# Patient Record
Sex: MALE | Race: WHITE | NOT HISPANIC OR LATINO | Employment: OTHER | RURAL
[De-identification: names, ages, dates, MRNs, and addresses within clinical notes are randomized per-mention and may not be internally consistent; named-entity substitution may affect disease eponyms.]

---

## 2021-03-18 RX ORDER — AMIODARONE HYDROCHLORIDE 200 MG/1
200 TABLET ORAL DAILY
COMMUNITY
End: 2021-11-01

## 2021-03-18 RX ORDER — CARVEDILOL 12.5 MG/1
12.5 TABLET ORAL 2 TIMES DAILY
COMMUNITY
End: 2021-11-23 | Stop reason: SDUPTHER

## 2021-03-18 RX ORDER — ASPIRIN 81 MG/1
81 TABLET ORAL 2 TIMES DAILY
COMMUNITY
End: 2021-03-23 | Stop reason: ALTCHOICE

## 2021-03-23 ENCOUNTER — OFFICE VISIT (OUTPATIENT)
Dept: CARDIOLOGY | Facility: CLINIC | Age: 76
End: 2021-03-23
Payer: MEDICARE

## 2021-03-23 VITALS
WEIGHT: 153 LBS | OXYGEN SATURATION: 98 % | SYSTOLIC BLOOD PRESSURE: 138 MMHG | DIASTOLIC BLOOD PRESSURE: 84 MMHG | HEART RATE: 48 BPM | HEIGHT: 66 IN | BODY MASS INDEX: 24.59 KG/M2

## 2021-03-23 DIAGNOSIS — I38 VALVULAR HEART DISEASE: ICD-10-CM

## 2021-03-23 DIAGNOSIS — Z79.899 HIGH RISK MEDICATION USE: Primary | ICD-10-CM

## 2021-03-23 DIAGNOSIS — I10 ESSENTIAL HYPERTENSION: ICD-10-CM

## 2021-03-23 DIAGNOSIS — I48.0 PAROXYSMAL ATRIAL FIBRILLATION: ICD-10-CM

## 2021-03-23 DIAGNOSIS — E78.00 PURE HYPERCHOLESTEROLEMIA: ICD-10-CM

## 2021-03-23 DIAGNOSIS — R00.1 BRADYCARDIA: ICD-10-CM

## 2021-03-23 PROCEDURE — 93010 ELECTROCARDIOGRAM REPORT: CPT | Mod: S$PBB,,, | Performed by: INTERNAL MEDICINE

## 2021-03-23 PROCEDURE — 99999 PR PBB SHADOW E&M-EST. PATIENT-LVL III: CPT | Mod: PBBFAC,,, | Performed by: NURSE PRACTITIONER

## 2021-03-23 PROCEDURE — 99213 OFFICE O/P EST LOW 20 MIN: CPT | Mod: PBBFAC,25 | Performed by: NURSE PRACTITIONER

## 2021-03-23 PROCEDURE — 99213 OFFICE O/P EST LOW 20 MIN: CPT | Mod: S$PBB,,, | Performed by: NURSE PRACTITIONER

## 2021-03-23 PROCEDURE — 93010 EKG 12-LEAD: ICD-10-PCS | Mod: S$PBB,,, | Performed by: INTERNAL MEDICINE

## 2021-03-23 PROCEDURE — 99213 PR OFFICE/OUTPT VISIT, EST, LEVL III, 20-29 MIN: ICD-10-PCS | Mod: S$PBB,,, | Performed by: NURSE PRACTITIONER

## 2021-03-23 PROCEDURE — 99999 PR PBB SHADOW E&M-EST. PATIENT-LVL III: ICD-10-PCS | Mod: PBBFAC,,, | Performed by: NURSE PRACTITIONER

## 2021-03-23 PROCEDURE — 93005 ELECTROCARDIOGRAM TRACING: CPT | Mod: PBBFAC | Performed by: INTERNAL MEDICINE

## 2021-03-23 RX ORDER — AMLODIPINE BESYLATE 5 MG/1
5 TABLET ORAL DAILY
COMMUNITY
Start: 2021-02-24 | End: 2022-03-29

## 2021-03-23 RX ORDER — AMOXICILLIN 500 MG
1 CAPSULE ORAL DAILY
COMMUNITY

## 2021-11-01 ENCOUNTER — INFUSION (OUTPATIENT)
Dept: INFECTIOUS DISEASES | Facility: HOSPITAL | Age: 76
End: 2021-11-01
Attending: NURSE PRACTITIONER
Payer: MEDICARE

## 2021-11-01 ENCOUNTER — OFFICE VISIT (OUTPATIENT)
Dept: FAMILY MEDICINE | Facility: CLINIC | Age: 76
End: 2021-11-01
Payer: MEDICARE

## 2021-11-01 VITALS
OXYGEN SATURATION: 97 % | DIASTOLIC BLOOD PRESSURE: 88 MMHG | SYSTOLIC BLOOD PRESSURE: 144 MMHG | TEMPERATURE: 100 F | HEART RATE: 72 BPM

## 2021-11-01 VITALS — OXYGEN SATURATION: 95 % | HEART RATE: 68 BPM

## 2021-11-01 DIAGNOSIS — R05.9 COUGH: ICD-10-CM

## 2021-11-01 DIAGNOSIS — U07.1 COVID-19: ICD-10-CM

## 2021-11-01 DIAGNOSIS — R50.9 FEVER, UNSPECIFIED FEVER CAUSE: ICD-10-CM

## 2021-11-01 DIAGNOSIS — R52 BODY ACHES: ICD-10-CM

## 2021-11-01 DIAGNOSIS — U07.1 COVID-19: Primary | ICD-10-CM

## 2021-11-01 LAB
CTP QC/QA: YES
FLUAV AG NPH QL: NEGATIVE
FLUBV AG NPH QL: NEGATIVE
SARS-COV-2 AG RESP QL IA.RAPID: POSITIVE

## 2021-11-01 PROCEDURE — M0243 CASIRIVI AND IMDEVI INFUSION: HCPCS | Performed by: NURSE PRACTITIONER

## 2021-11-01 PROCEDURE — 87428 SARSCOV & INF VIR A&B AG IA: CPT | Mod: RHCUB | Performed by: NURSE PRACTITIONER

## 2021-11-01 PROCEDURE — 99212 OFFICE O/P EST SF 10 MIN: CPT | Mod: CR,,, | Performed by: NURSE PRACTITIONER

## 2021-11-01 PROCEDURE — 99212 PR OFFICE/OUTPT VISIT, EST, LEVL II, 10-19 MIN: ICD-10-PCS | Mod: CR,,, | Performed by: NURSE PRACTITIONER

## 2021-11-01 PROCEDURE — 63600175 PHARM REV CODE 636 W HCPCS: Performed by: NURSE PRACTITIONER

## 2021-11-01 RX ORDER — DIPHENHYDRAMINE HYDROCHLORIDE 50 MG/ML
25 INJECTION INTRAMUSCULAR; INTRAVENOUS ONCE AS NEEDED
Status: DISCONTINUED | OUTPATIENT
Start: 2021-11-01 | End: 2022-03-29

## 2021-11-01 RX ORDER — ALBUTEROL SULFATE 90 UG/1
2 AEROSOL, METERED RESPIRATORY (INHALATION)
Status: DISCONTINUED | OUTPATIENT
Start: 2021-11-01 | End: 2022-03-29

## 2021-11-01 RX ORDER — SODIUM CHLORIDE 0.9 % (FLUSH) 0.9 %
10 SYRINGE (ML) INJECTION
Status: DISCONTINUED | OUTPATIENT
Start: 2021-11-01 | End: 2022-03-29

## 2021-11-01 RX ORDER — ONDANSETRON 4 MG/1
4 TABLET, ORALLY DISINTEGRATING ORAL ONCE AS NEEDED
Status: DISCONTINUED | OUTPATIENT
Start: 2021-11-01 | End: 2022-03-29

## 2021-11-01 RX ORDER — EPINEPHRINE 0.3 MG/.3ML
0.3 INJECTION SUBCUTANEOUS
Status: DISCONTINUED | OUTPATIENT
Start: 2021-11-01 | End: 2022-03-29

## 2021-11-01 RX ORDER — ACETAMINOPHEN 325 MG/1
650 TABLET ORAL ONCE AS NEEDED
Status: DISCONTINUED | OUTPATIENT
Start: 2021-11-01 | End: 2022-03-29

## 2021-11-01 RX ADMIN — Medication 600 MG: at 01:11

## 2021-11-23 ENCOUNTER — OFFICE VISIT (OUTPATIENT)
Dept: CARDIOLOGY | Facility: CLINIC | Age: 76
End: 2021-11-23
Payer: MEDICARE

## 2021-11-23 VITALS
OXYGEN SATURATION: 99 % | DIASTOLIC BLOOD PRESSURE: 88 MMHG | WEIGHT: 153 LBS | HEART RATE: 77 BPM | SYSTOLIC BLOOD PRESSURE: 164 MMHG | HEIGHT: 66 IN | BODY MASS INDEX: 24.59 KG/M2

## 2021-11-23 DIAGNOSIS — I48.91 ATRIAL FIBRILLATION, UNSPECIFIED TYPE: Primary | ICD-10-CM

## 2021-11-23 PROCEDURE — 99214 OFFICE O/P EST MOD 30 MIN: CPT | Mod: PBBFAC | Performed by: NURSE PRACTITIONER

## 2021-11-23 PROCEDURE — 93010 EKG 12-LEAD: ICD-10-PCS | Mod: S$PBB,,, | Performed by: INTERNAL MEDICINE

## 2021-11-23 PROCEDURE — 99214 PR OFFICE/OUTPT VISIT, EST, LEVL IV, 30-39 MIN: ICD-10-PCS | Mod: S$PBB,,, | Performed by: NURSE PRACTITIONER

## 2021-11-23 PROCEDURE — 99214 OFFICE O/P EST MOD 30 MIN: CPT | Mod: S$PBB,,, | Performed by: NURSE PRACTITIONER

## 2021-11-23 PROCEDURE — 93005 ELECTROCARDIOGRAM TRACING: CPT | Mod: PBBFAC | Performed by: INTERNAL MEDICINE

## 2021-11-23 PROCEDURE — 93010 ELECTROCARDIOGRAM REPORT: CPT | Mod: S$PBB,,, | Performed by: INTERNAL MEDICINE

## 2021-11-23 RX ORDER — CARVEDILOL 12.5 MG/1
12.5 TABLET ORAL 2 TIMES DAILY
Qty: 180 TABLET | Refills: 1 | Status: SHIPPED | OUTPATIENT
Start: 2021-11-23 | End: 2022-08-23 | Stop reason: SDUPTHER

## 2022-03-11 DIAGNOSIS — Z71.89 COMPLEX CARE COORDINATION: ICD-10-CM

## 2022-03-29 ENCOUNTER — OFFICE VISIT (OUTPATIENT)
Dept: FAMILY MEDICINE | Facility: CLINIC | Age: 77
End: 2022-03-29
Payer: MEDICARE

## 2022-03-29 ENCOUNTER — HOSPITAL ENCOUNTER (OUTPATIENT)
Dept: RADIOLOGY | Facility: HOSPITAL | Age: 77
Discharge: HOME | End: 2022-03-29
Attending: NURSE PRACTITIONER
Payer: MEDICARE

## 2022-03-29 VITALS
BODY MASS INDEX: 24.94 KG/M2 | TEMPERATURE: 98 F | HEIGHT: 66 IN | SYSTOLIC BLOOD PRESSURE: 132 MMHG | WEIGHT: 155.19 LBS | DIASTOLIC BLOOD PRESSURE: 86 MMHG | RESPIRATION RATE: 20 BRPM | OXYGEN SATURATION: 98 % | HEART RATE: 77 BPM

## 2022-03-29 DIAGNOSIS — M79.661 PAIN AND SWELLING OF RIGHT LOWER LEG: Primary | ICD-10-CM

## 2022-03-29 DIAGNOSIS — M79.89 PAIN AND SWELLING OF RIGHT LOWER LEG: ICD-10-CM

## 2022-03-29 DIAGNOSIS — S80.11XA HEMATOMA OF RIGHT LOWER LEG: ICD-10-CM

## 2022-03-29 DIAGNOSIS — M79.661 PAIN AND SWELLING OF RIGHT LOWER LEG: ICD-10-CM

## 2022-03-29 DIAGNOSIS — M79.89 PAIN AND SWELLING OF RIGHT LOWER LEG: Primary | ICD-10-CM

## 2022-03-29 PROBLEM — U07.1 COVID-19: Status: RESOLVED | Noted: 2021-11-01 | Resolved: 2022-03-29

## 2022-03-29 PROCEDURE — 73590 XR TIBIA FIBULA 2 VIEW RIGHT: ICD-10-PCS | Mod: 26,RT,, | Performed by: RADIOLOGY

## 2022-03-29 PROCEDURE — 99212 PR OFFICE/OUTPT VISIT, EST, LEVL II, 10-19 MIN: ICD-10-PCS | Mod: ,,, | Performed by: NURSE PRACTITIONER

## 2022-03-29 PROCEDURE — 73590 X-RAY EXAM OF LOWER LEG: CPT | Mod: TC,RT

## 2022-03-29 PROCEDURE — 99212 OFFICE O/P EST SF 10 MIN: CPT | Mod: ,,, | Performed by: NURSE PRACTITIONER

## 2022-03-29 PROCEDURE — 73590 X-RAY EXAM OF LOWER LEG: CPT | Mod: 26,RT,, | Performed by: RADIOLOGY

## 2022-03-29 RX ORDER — AMLODIPINE BESYLATE 10 MG/1
10 TABLET ORAL DAILY
COMMUNITY
Start: 2021-05-06 | End: 2022-05-17 | Stop reason: SDUPTHER

## 2022-03-29 NOTE — PROGRESS NOTES
[facility] - FAMILY MEDICINE       PATIENT NAME: Ruben Alarcon   : 1945    AGE: 76 y.o. DATE: 2022    MRN: [id number]        Reason for Visit / Chief Complaint: Leg Injury (Pt presents with c/o right lower leg knot/pain. Pt states on 3/16/22 he dropped a trailer tongue on it There was swelling to his foot, following the accident, but that is better now. )     Subjective:     Presents as a walk-in c/o knot and pain to right lower leg since dropping a trailer tongue on his leg 13 days ago.    Review of Systems:     Review of Systems   Constitutional: Negative.    Respiratory: Negative.    Cardiovascular: Negative.    Musculoskeletal:        Right lower leg pain and swelling   Neurological: Negative.        Allergies and Medications:   Review of patient's allergies indicates:  No Known Allergies     Current Outpatient Medications on File Prior to Visit   Medication Sig Dispense Refill    amLODIPine (NORVASC) 10 MG tablet Take 10 mg by mouth once daily.      carvediloL (COREG) 12.5 MG tablet Take 1 tablet (12.5 mg total) by mouth 2 (two) times daily. 180 tablet 1    ELIQUIS 5 mg Tab TAKE 1 TABLET TWICE A  tablet 3    omega-3 fatty acids/fish oil (FISH OIL-OMEGA-3 FATTY ACIDS) 300-1,000 mg capsule Take 1 capsule by mouth once daily.      [DISCONTINUED] amLODIPine (NORVASC) 5 MG tablet Take 5 mg by mouth once daily.       Current Facility-Administered Medications on File Prior to Visit   Medication Dose Route Frequency Provider Last Rate Last Admin    [DISCONTINUED] acetaminophen tablet 650 mg  650 mg Oral Once PRN ULYSSES Gabriel        [DISCONTINUED] albuterol inhaler 2 puff  2 puff Inhalation Q20 Min PRN ULYSSES Gabriel        [DISCONTINUED] diphenhydrAMINE injection 25 mg  25 mg Intravenous Once PRN ULYSSES Gabriel        [DISCONTINUED] EPINEPHrine (EPIPEN) 0.3 mg/0.3 mL pen injection 0.3 mg  0.3 mg  "Intramuscular PRN ULYSSES Gabriel        [DISCONTINUED] methylPREDNISolone sodium succinate injection 40 mg  40 mg Intravenous Once PRN ULYSSES Gabriel        [DISCONTINUED] ondansetron disintegrating tablet 4 mg  4 mg Oral Once PRN JUAN GabirelP        [DISCONTINUED] sodium chloride 0.9% 500 mL flush bag   Intravenous PRN JUAN GabrielP        [DISCONTINUED] sodium chloride 0.9% flush 10 mL  10 mL Intravenous PRN ULYSSES Gabriel           Medical/Social/Family History:     Past Medical History:   Diagnosis Date    Atrial fibrillation     Bradycardia     Hyperlipidemia     Hypertension     Valvular heart disease     Type: TR    White coat syndrome with diagnosis of hypertension       Social History     Tobacco Use   Smoking Status Never Smoker   Smokeless Tobacco Never Used      Social History     Substance and Sexual Activity   Alcohol Use Never       Family History   Problem Relation Age of Onset    Hypertension Mother     Hypertension Father       Past Surgical History:   Procedure Laterality Date    SHOULDER SURGERY Left     TONSILLECTOMY        Immunization History   Administered Date(s) Administered    Hepatitis A, Adult 10/26/1998, 05/22/1999    Meningococcal Polysaccharide (23-Valent) (MPSV4) 10/26/1998    Td (ADULT) 01/01/1993, 01/09/1993, 02/01/2003          Objective:     Wt Readings from Last 3 Encounters:   03/29/22 0750 70.4 kg (155 lb 3.2 oz)   11/23/21 1041 69.4 kg (153 lb)   03/23/21 0906 69.4 kg (153 lb)       Vitals:    03/29/22 0750   BP: 132/86   BP Location: Left arm   Patient Position: Sitting   BP Method: Large (Manual)   Pulse: 77   Resp: 20   Temp: 98.3 °F (36.8 °C)   TempSrc: Temporal   SpO2: 98%   Weight: 70.4 kg (155 lb 3.2 oz)   Height: 5' 6" (1.676 m)     Body mass index is 25.05 kg/m².     Physical Exam:    Physical Exam  Vitals and nursing note reviewed.   Constitutional:       Appearance: Normal appearance. "   Cardiovascular:      Rate and Rhythm: Normal rate and regular rhythm.      Pulses: Normal pulses.      Heart sounds: Normal heart sounds.   Pulmonary:      Effort: Pulmonary effort is normal.      Breath sounds: Normal breath sounds.   Musculoskeletal:      Right lower leg: Swelling (large raised hematoma to anterior lower right leg), tenderness and bony tenderness present.      Right foot: Normal range of motion and normal capillary refill. Swelling (fading bruising and mild swelling noted to right foot  ) present. Normal pulse.   Skin:     General: Skin is warm and dry.   Neurological:      Mental Status: He is alert and oriented to person, place, and time.         Assessment:          ICD-10-CM ICD-9-CM   1. Pain and swelling of right lower leg  M79.661 729.5    M79.89 729.81   2. Hematoma of right lower leg  S80.11XA 924.10        Plan:       No xray coverage in clinic. To [facility] for xray right lower leg. Will call and notify of results.  Advised likely just a hematoma that will take weeks to months to resolve due to its size. Hematoma isn't causing any detrimental compression or problem.     Pain and swelling of right lower leg  -     X-Ray Tibia Fibula 2 View Right; Future; Expected date: 03/29/2022    Hematoma of right lower leg  -     X-Ray Tibia Fibula 2 View Right; Future; Expected date: 03/29/2022        Current Outpatient Medications:     amLODIPine (NORVASC) 10 MG tablet, Take 10 mg by mouth once daily., Disp: , Rfl:     carvediloL (COREG) 12.5 MG tablet, Take 1 tablet (12.5 mg total) by mouth 2 (two) times daily., Disp: 180 tablet, Rfl: 1    ELIQUIS 5 mg Tab, TAKE 1 TABLET TWICE A DAY, Disp: 180 tablet, Rfl: 3    omega-3 fatty acids/fish oil (FISH OIL-OMEGA-3 FATTY ACIDS) 300-1,000 mg capsule, Take 1 capsule by mouth once daily., Disp: , Rfl:   No current facility-administered medications for this visit.    Requested Prescriptions      No prescriptions requested or ordered in this encounter      F/u as needed or if symptoms worsen or persist.    Future Appointments   Date Time Provider Department Center   5/17/2022  8:45 AM SHANON Laboy [facility]       Signature: Juli Zapata FNP-BC

## 2022-04-22 ENCOUNTER — OFFICE VISIT (OUTPATIENT)
Dept: FAMILY MEDICINE | Facility: CLINIC | Age: 77
End: 2022-04-22
Payer: MEDICARE

## 2022-04-22 VITALS
HEIGHT: 66 IN | SYSTOLIC BLOOD PRESSURE: 132 MMHG | WEIGHT: 153 LBS | RESPIRATION RATE: 16 BRPM | HEART RATE: 66 BPM | DIASTOLIC BLOOD PRESSURE: 80 MMHG | OXYGEN SATURATION: 99 % | BODY MASS INDEX: 24.59 KG/M2 | TEMPERATURE: 97 F

## 2022-04-22 DIAGNOSIS — N39.0 URINARY TRACT INFECTION WITH HEMATURIA, SITE UNSPECIFIED: ICD-10-CM

## 2022-04-22 DIAGNOSIS — R31.9 URINARY TRACT INFECTION WITH HEMATURIA, SITE UNSPECIFIED: ICD-10-CM

## 2022-04-22 DIAGNOSIS — R31.9 HEMATURIA, UNSPECIFIED TYPE: Primary | ICD-10-CM

## 2022-04-22 LAB
BACTERIA #/AREA URNS HPF: ABNORMAL /HPF
BILIRUB UR QL STRIP: ABNORMAL
CLARITY UR: ABNORMAL
COLOR UR: ABNORMAL
GLUCOSE UR STRIP-MCNC: NEGATIVE MG/DL
KETONES UR STRIP-SCNC: ABNORMAL MG/DL
LEUKOCYTE ESTERASE UR QL STRIP: ABNORMAL
NITRITE UR QL STRIP: POSITIVE
PH UR STRIP: 6.5 PH UNITS
PROT UR QL STRIP: >=300
RBC # UR STRIP: ABNORMAL /UL
RBC #/AREA URNS HPF: ABNORMAL /HPF
SP GR UR STRIP: 1.02
SQUAMOUS #/AREA URNS LPF: ABNORMAL /LPF
UROBILINOGEN UR STRIP-ACNC: 1 MG/DL
WBC #/AREA URNS HPF: ABNORMAL /HPF

## 2022-04-22 PROCEDURE — 99213 OFFICE O/P EST LOW 20 MIN: CPT | Mod: ,,, | Performed by: NURSE PRACTITIONER

## 2022-04-22 PROCEDURE — 81001 URINALYSIS AUTO W/SCOPE: CPT | Mod: ,,, | Performed by: CLINICAL MEDICAL LABORATORY

## 2022-04-22 PROCEDURE — 81001 URINALYSIS, REFLEX TO URINE CULTURE: ICD-10-PCS | Mod: ,,, | Performed by: CLINICAL MEDICAL LABORATORY

## 2022-04-22 PROCEDURE — 99213 PR OFFICE/OUTPT VISIT, EST, LEVL III, 20-29 MIN: ICD-10-PCS | Mod: ,,, | Performed by: NURSE PRACTITIONER

## 2022-04-22 NOTE — PROGRESS NOTES
Subjective:       Patient ID: Ruben Alarcon is a 76 y.o. male.    Chief Complaint: Hematuria (Started yesterday. Denies painful urination. )    Pt presents with hematuria since yesterday. Pt denies dysuria, urinary frequency and low back pain.     Review of Systems   Constitutional: Negative for activity change, appetite change, fatigue and fever.   HENT: Negative for nasal congestion, nosebleeds, postnasal drip, rhinorrhea, sinus pressure/congestion, sneezing and sore throat.    Eyes: Negative for pain and itching.   Respiratory: Negative for cough, chest tightness, shortness of breath, wheezing and stridor.    Cardiovascular: Negative for chest pain.   Gastrointestinal: Negative for abdominal pain.   Genitourinary: Positive for hematuria. Negative for dysuria.   Musculoskeletal: Negative for back pain.   Neurological: Negative for dizziness and headaches.   Psychiatric/Behavioral: Negative for behavioral problems and confusion.         Objective:      Physical Exam  Vitals and nursing note reviewed.   Constitutional:       Appearance: Normal appearance.   Cardiovascular:      Rate and Rhythm: Normal rate and regular rhythm.      Heart sounds: Normal heart sounds.   Pulmonary:      Effort: Pulmonary effort is normal.      Breath sounds: Normal breath sounds.   Musculoskeletal:         General: Normal range of motion.   Neurological:      Mental Status: He is alert and oriented to person, place, and time.   Psychiatric:         Mood and Affect: Mood normal.         Behavior: Behavior normal.         Assessment:       Problem List Items Addressed This Visit    None     Visit Diagnoses     Hematuria, unspecified type    -  Primary    Relevant Orders    Urinalysis, Reflex to Urine Culture    POCT URINALYSIS W/O SCOPE          Plan:       Will call pt with the urine results and treat if indicated. Drink plenty of water. Go to the er with any worsening symptoms. We can refer to urology if needed. And if symptoms  persist he needs to make an appointment with cardiology to discuss eliquis.

## 2022-04-25 DIAGNOSIS — R31.9 URINARY TRACT INFECTION WITH HEMATURIA, SITE UNSPECIFIED: Primary | ICD-10-CM

## 2022-04-25 DIAGNOSIS — N39.0 URINARY TRACT INFECTION WITH HEMATURIA, SITE UNSPECIFIED: Primary | ICD-10-CM

## 2022-04-25 PROCEDURE — 87086 CULTURE, URINE: ICD-10-PCS | Mod: ,,, | Performed by: CLINICAL MEDICAL LABORATORY

## 2022-04-25 PROCEDURE — 87086 URINE CULTURE/COLONY COUNT: CPT | Mod: ,,, | Performed by: CLINICAL MEDICAL LABORATORY

## 2022-04-27 LAB — UA COMPLETE W REFLEX CULTURE PNL UR: NO GROWTH

## 2022-05-17 ENCOUNTER — OFFICE VISIT (OUTPATIENT)
Dept: CARDIOLOGY | Facility: CLINIC | Age: 77
End: 2022-05-17
Payer: MEDICARE

## 2022-05-17 VITALS
HEIGHT: 66 IN | WEIGHT: 151 LBS | HEART RATE: 55 BPM | BODY MASS INDEX: 24.27 KG/M2 | SYSTOLIC BLOOD PRESSURE: 138 MMHG | DIASTOLIC BLOOD PRESSURE: 82 MMHG

## 2022-05-17 DIAGNOSIS — I10 HYPERTENSION, UNSPECIFIED TYPE: ICD-10-CM

## 2022-05-17 DIAGNOSIS — I48.0 PAROXYSMAL ATRIAL FIBRILLATION: Primary | ICD-10-CM

## 2022-05-17 PROCEDURE — 93005 ELECTROCARDIOGRAM TRACING: CPT | Mod: PBBFAC | Performed by: INTERNAL MEDICINE

## 2022-05-17 PROCEDURE — 99213 OFFICE O/P EST LOW 20 MIN: CPT | Mod: PBBFAC | Performed by: NURSE PRACTITIONER

## 2022-05-17 PROCEDURE — 93010 ELECTROCARDIOGRAM REPORT: CPT | Mod: S$PBB,,, | Performed by: INTERNAL MEDICINE

## 2022-05-17 PROCEDURE — 99214 OFFICE O/P EST MOD 30 MIN: CPT | Mod: S$PBB,,, | Performed by: NURSE PRACTITIONER

## 2022-05-17 PROCEDURE — 93010 EKG 12-LEAD: ICD-10-PCS | Mod: S$PBB,,, | Performed by: INTERNAL MEDICINE

## 2022-05-17 PROCEDURE — 99214 PR OFFICE/OUTPT VISIT, EST, LEVL IV, 30-39 MIN: ICD-10-PCS | Mod: S$PBB,,, | Performed by: NURSE PRACTITIONER

## 2022-05-17 RX ORDER — AMLODIPINE BESYLATE 10 MG/1
10 TABLET ORAL DAILY
Qty: 90 TABLET | Refills: 3 | Status: ON HOLD | OUTPATIENT
Start: 2022-05-17 | End: 2023-02-23 | Stop reason: HOSPADM

## 2022-05-17 NOTE — PROGRESS NOTES
"[facility] note        DATE OF SERVICE: 05/17/2022       PCP: ULYSSES Gabriel      CHIEF COMPLAINT:   Chief Complaint   Patient presents with    Follow-up     6 month f/u. Denies any cardiac complaints.         HISTORY OF PRESENT ILLNESS:  Ruben Alarcon is a 76 y.o. male with a PMH of   Past Medical History:   Diagnosis Date    Atrial fibrillation     Bradycardia     COVID-19 11/1/2021    Hyperlipidemia     Hypertension     Valvular heart disease     Type: TR    White coat syndrome with diagnosis of hypertension      who presents for routine follow up. He remains active and symptomatic. He states he has been very stressed due to issue with his step son. His bp at home has been 120-130 systolic but he "got to thinking about everything while waiting" to see me.  Chief Complaint   Patient presents with    Follow-up     6 month f/u. Denies any cardiac complaints.             PAST MEDICAL HISTORY:  Past Medical History:   Diagnosis Date    Atrial fibrillation     Bradycardia     COVID-19 11/1/2021    Hyperlipidemia     Hypertension     Valvular heart disease     Type: TR    White coat syndrome with diagnosis of hypertension        PAST SURGICAL HISTORY:  Past Surgical History:   Procedure Laterality Date    SHOULDER SURGERY Left     TONSILLECTOMY         SOCIAL HISTORY:  Social History     Socioeconomic History    Marital status:    Tobacco Use    Smoking status: Never Smoker    Smokeless tobacco: Never Used   Substance and Sexual Activity    Alcohol use: Never    Drug use: Never    Sexual activity: Yes       FAMILY HISTORY:  Family History   Problem Relation Age of Onset    Hypertension Mother     Hypertension Father          ALLERGIES:  Review of patient's allergies indicates:  No Known Allergies     MEDICATIONS:    Current Outpatient Medications:     amLODIPine (NORVASC) 10 MG tablet, Take 10 mg by mouth once daily., Disp: , Rfl:     carvediloL (COREG) 12.5 MG " "tablet, Take 1 tablet (12.5 mg total) by mouth 2 (two) times daily., Disp: 180 tablet, Rfl: 1    ELIQUIS 5 mg Tab, TAKE 1 TABLET TWICE A DAY, Disp: 180 tablet, Rfl: 3    omega-3 fatty acids/fish oil (FISH OIL-OMEGA-3 FATTY ACIDS) 300-1,000 mg capsule, Take 1 capsule by mouth once daily., Disp: , Rfl:   Medications have been reviewed but not reconciled. He did not bring his meds today.    PHYSICAL EXAM:  BP (!) 164/86 (BP Location: Left arm, Patient Position: Sitting, BP Method: Large (Manual))   Pulse (!) 55   Ht 5' 6" (1.676 m)   Wt 68.5 kg (151 lb)   BMI 24.37 kg/m²   Wt Readings from Last 3 Encounters:   05/17/22 68.5 kg (151 lb)   04/22/22 69.4 kg (153 lb)   03/29/22 70.4 kg (155 lb 3.2 oz)      Body mass index is 24.37 kg/m².    Physical Exam  Vitals and nursing note reviewed.   Constitutional:       Appearance: Normal appearance. He is normal weight.   HENT:      Head: Normocephalic and atraumatic.   Eyes:      Pupils: Pupils are equal, round, and reactive to light.   Cardiovascular:      Rate and Rhythm: Regular rhythm. Bradycardia present.      Pulses: Normal pulses.      Heart sounds: Normal heart sounds.   Pulmonary:      Effort: Pulmonary effort is normal.      Breath sounds: Normal breath sounds.   Abdominal:      General: Bowel sounds are normal.      Palpations: Abdomen is soft.   Musculoskeletal:      Cervical back: Neck supple.      Right lower leg: No edema.      Left lower leg: No edema.   Skin:     General: Skin is warm and dry.      Capillary Refill: Capillary refill takes less than 2 seconds.   Neurological:      General: No focal deficit present.      Mental Status: He is alert and oriented to person, place, and time.   Psychiatric:         Mood and Affect: Mood normal.         Behavior: Behavior normal.         LABS REVIEWED:  No results found for: WBC, RBC, HGB, HCT, MCV, MCH, MCHC, RDW, PLT, MPV, NRBC, INR  No results found for: NA, K, CL, CO2, BUN, MG, PHOS  No results found for: CPK, " AST, ALT  No results found for: GLU, HGBA1C  No results found for: CHOL, HDL, LDL, TRIG, CHOLHDL    CARDIAC STUDIES REVIEWED:EKG: sinus bradycardia with sinus arrhythmia; incomplete RBBB and inferior TWA         ASSESSMENT:   Patient Active Problem List   Diagnosis    Atrial fibrillation    Bradycardia    Hyperlipidemia    Hypertension    Valvular heart disease    Hematoma of right lower leg    Pain and swelling of right lower leg            Problem List Items Addressed This Visit        Cardiac/Vascular    Atrial fibrillation - Primary    Overview     On Eliquis for CVA prophylaxis             Relevant Orders    EKG 12-lead           PLAN: repeat blood pressure  Continue to monitor bp at home and if sbp consistently greater than 140 mmHg consider tx of anxiety and/or bp  Orders Placed This Encounter   Procedures    EKG 12-lead     Standing Status:   Future     Number of Occurrences:   1     Standing Expiration Date:   5/17/2023      RTC: 6 months

## 2022-08-23 DIAGNOSIS — I48.91 ATRIAL FIBRILLATION, UNSPECIFIED TYPE: ICD-10-CM

## 2022-08-23 RX ORDER — CARVEDILOL 12.5 MG/1
12.5 TABLET ORAL 2 TIMES DAILY
Qty: 180 TABLET | Refills: 1 | Status: SHIPPED | OUTPATIENT
Start: 2022-08-23 | End: 2023-01-17 | Stop reason: SDUPTHER

## 2022-10-09 DIAGNOSIS — Z71.89 COMPLEX CARE COORDINATION: ICD-10-CM

## 2022-11-15 ENCOUNTER — OFFICE VISIT (OUTPATIENT)
Dept: CARDIOLOGY | Facility: CLINIC | Age: 77
End: 2022-11-15
Payer: MEDICARE

## 2022-11-15 VITALS — BODY MASS INDEX: 24.05 KG/M2 | WEIGHT: 149.63 LBS | HEIGHT: 66 IN

## 2022-11-15 DIAGNOSIS — I48.91 ATRIAL FIBRILLATION, UNSPECIFIED TYPE: Primary | ICD-10-CM

## 2022-11-15 PROCEDURE — 93010 EKG 12-LEAD: ICD-10-PCS | Mod: S$PBB,,, | Performed by: INTERNAL MEDICINE

## 2022-11-15 PROCEDURE — 93010 ELECTROCARDIOGRAM REPORT: CPT | Mod: S$PBB,,, | Performed by: INTERNAL MEDICINE

## 2022-11-15 PROCEDURE — 93005 ELECTROCARDIOGRAM TRACING: CPT | Mod: PBBFAC | Performed by: INTERNAL MEDICINE

## 2022-11-15 PROCEDURE — 99213 OFFICE O/P EST LOW 20 MIN: CPT | Mod: PBBFAC | Performed by: NURSE PRACTITIONER

## 2022-11-15 PROCEDURE — 99214 OFFICE O/P EST MOD 30 MIN: CPT | Mod: S$PBB,,, | Performed by: NURSE PRACTITIONER

## 2022-11-15 PROCEDURE — 99214 PR OFFICE/OUTPT VISIT, EST, LEVL IV, 30-39 MIN: ICD-10-PCS | Mod: S$PBB,,, | Performed by: NURSE PRACTITIONER

## 2022-11-16 NOTE — PROGRESS NOTES
"[facility] note        DATE OF SERVICE: 11/16/2022       PCP: ULYSSES Gabriel      CHIEF COMPLAINT:   Chief Complaint   Patient presents with    Follow-up     Patient denies any cardiac complaints today.        HISTORY OF PRESENT ILLNESS:  Ruben Alarcon is a 77 y.o. male with a PMH of   Past Medical History:   Diagnosis Date    Atrial fibrillation     Bradycardia     COVID-19 11/1/2021    Hyperlipidemia     Hypertension     Valvular heart disease     Type: TR    White coat syndrome with diagnosis of hypertension      who presents for routine follow up. He is doing well. He has had no issues since his ablation and has been released from Dr. Avery. He monitors his bp at home and it is usually in the 130s/80s. He states that he has been excited today.    5/17/2022 routine follow up. He remains active and symptomatic. He states he has been very stressed due to issue with his step son. His bp at home has been 120-130 systolic but he "got to thinking about everything while waiting" to see me.  Chief Complaint   Patient presents with    Follow-up     Patient denies any cardiac complaints today.            PAST MEDICAL HISTORY:  Past Medical History:   Diagnosis Date    Atrial fibrillation     Bradycardia     COVID-19 11/1/2021    Hyperlipidemia     Hypertension     Valvular heart disease     Type: TR    White coat syndrome with diagnosis of hypertension        PAST SURGICAL HISTORY:  Past Surgical History:   Procedure Laterality Date    SHOULDER SURGERY Left     TONSILLECTOMY         SOCIAL HISTORY:  Social History     Socioeconomic History    Marital status:    Tobacco Use    Smoking status: Never    Smokeless tobacco: Never   Substance and Sexual Activity    Alcohol use: Never    Drug use: Never    Sexual activity: Yes       FAMILY HISTORY:  Family History   Problem Relation Age of Onset    Hypertension Mother     Hypertension Father          ALLERGIES:  Review of patient's allergies " "indicates:  No Known Allergies     MEDICATIONS:    Current Outpatient Medications:     amLODIPine (NORVASC) 10 MG tablet, Take 1 tablet (10 mg total) by mouth once daily., Disp: 90 tablet, Rfl: 3    carvediloL (COREG) 12.5 MG tablet, Take 1 tablet (12.5 mg total) by mouth 2 (two) times daily., Disp: 180 tablet, Rfl: 1    ELIQUIS 5 mg Tab, TAKE 1 TABLET TWICE A DAY, Disp: 180 tablet, Rfl: 3    omega-3 fatty acids/fish oil (FISH OIL-OMEGA-3 FATTY ACIDS) 300-1,000 mg capsule, Take 1 capsule by mouth once daily., Disp: , Rfl:   Medications have been reviewed but not reconciled. He did not bring his meds today.    PHYSICAL EXAM:  Ht 5' 6" (1.676 m)   Wt 67.9 kg (149 lb 9.6 oz)   BMI 24.15 kg/m²   Wt Readings from Last 3 Encounters:   11/15/22 67.9 kg (149 lb 9.6 oz)   05/17/22 68.5 kg (151 lb)   04/22/22 69.4 kg (153 lb)      Body mass index is 24.15 kg/m².    Physical Exam  Vitals and nursing note reviewed.   Constitutional:       Appearance: Normal appearance. He is normal weight.   HENT:      Head: Normocephalic and atraumatic.   Eyes:      Pupils: Pupils are equal, round, and reactive to light.   Cardiovascular:      Rate and Rhythm: Regular rhythm. Bradycardia present.      Pulses: Normal pulses.      Heart sounds: Normal heart sounds.   Pulmonary:      Effort: Pulmonary effort is normal.      Breath sounds: Normal breath sounds.   Abdominal:      General: Bowel sounds are normal.      Palpations: Abdomen is soft.   Musculoskeletal:      Cervical back: Neck supple.      Right lower leg: No edema.      Left lower leg: No edema.   Skin:     General: Skin is warm and dry.      Capillary Refill: Capillary refill takes less than 2 seconds.   Neurological:      General: No focal deficit present.      Mental Status: He is alert and oriented to person, place, and time.   Psychiatric:         Mood and Affect: Mood normal.         Behavior: Behavior normal.       LABS REVIEWED:  No results found for: WBC, RBC, HGB, HCT, " MCV, MCH, MCHC, RDW, PLT, MPV, NRBC, INR  No results found for: NA, K, CL, CO2, BUN, MG, PHOS  No results found for: CPK, AST, ALT  No results found for: GLU, HGBA1C  No results found for: CHOL, HDL, LDL, TRIG, CHOLHDL    CARDIAC STUDIES REVIEWED:EK/15/2022 RSR with HR 60 bpm  2022  sinus bradycardia with sinus arrhythmia; incomplete RBBB and inferior TWA         ASSESSMENT:   Patient Active Problem List   Diagnosis    Atrial fibrillation    Bradycardia    Hyperlipidemia    Hypertension    Valvular heart disease    Hematoma of right lower leg    Pain and swelling of right lower leg            Problem List Items Addressed This Visit          Cardiac/Vascular    Atrial fibrillation - Primary    Overview     On Eliquis for CVA prophylaxis               PLAN: repeat blood pressure  Continue to monitor bp at home and if sbp consistently greater than 140 mmHg consider tx of anxiety and/or bp  The patient discussed stopping Eliquis with Dr. Avery prior to his release. According to the patient he was offered the Watchman procedure but declined. I d/w Dr. Ma and we feel he continues to benefit from the Eliquis for CVA prophylaxis with a [id number] score of 3.  Orders Placed This Encounter   Procedures    EKG 12-lead     Standing Status:   Future     Number of Occurrences:   1     Standing Expiration Date:   11/15/2023      RTC: 6 months    HR and bp check in 3 weeks; co-relate his cp monitor readings with  our manual readings

## 2022-12-06 ENCOUNTER — CLINICAL SUPPORT (OUTPATIENT)
Dept: CARDIOLOGY | Facility: CLINIC | Age: 77
End: 2022-12-06
Payer: MEDICARE

## 2022-12-06 DIAGNOSIS — I10 HYPERTENSION, UNSPECIFIED TYPE: Primary | ICD-10-CM

## 2022-12-06 PROCEDURE — 99212 OFFICE O/P EST SF 10 MIN: CPT | Mod: PBBFAC

## 2022-12-12 VITALS — HEART RATE: 65 BPM | OXYGEN SATURATION: 96 % | DIASTOLIC BLOOD PRESSURE: 96 MMHG | SYSTOLIC BLOOD PRESSURE: 146 MMHG

## 2022-12-12 NOTE — PROGRESS NOTES
Pt BP cuff- right- 193/101 and left- 192/102    Pt states he has not slept all day and he has driven all day.     Per Sheron, pt needs new BP machine and come back when he has rested.       Pt told he needed new BP machine and repeat. Pt states his wife is still in rehab. He will call us when she is out of rehab and he can get things situated and not be on the road all the time.

## 2023-01-17 DIAGNOSIS — I48.91 ATRIAL FIBRILLATION, UNSPECIFIED TYPE: ICD-10-CM

## 2023-01-18 RX ORDER — CARVEDILOL 12.5 MG/1
12.5 TABLET ORAL 2 TIMES DAILY
Qty: 180 TABLET | Refills: 1 | Status: SHIPPED | OUTPATIENT
Start: 2023-01-18 | End: 2023-08-14 | Stop reason: SDUPTHER

## 2023-02-21 ENCOUNTER — HOSPITAL ENCOUNTER (INPATIENT)
Facility: HOSPITAL | Age: 78
LOS: 2 days | Discharge: HOME | DRG: 369 | End: 2023-02-23
Attending: EMERGENCY MEDICINE | Admitting: INTERNAL MEDICINE
Payer: MEDICARE

## 2023-02-21 DIAGNOSIS — D62 ACUTE BLOOD LOSS ANEMIA: ICD-10-CM

## 2023-02-21 DIAGNOSIS — R07.9 CHEST PAIN: ICD-10-CM

## 2023-02-21 DIAGNOSIS — I48.0 PAROXYSMAL ATRIAL FIBRILLATION: ICD-10-CM

## 2023-02-21 DIAGNOSIS — K20.90 ESOPHAGITIS: Primary | ICD-10-CM

## 2023-02-21 DIAGNOSIS — K29.70 GASTRITIS, PRESENCE OF BLEEDING UNSPECIFIED, UNSPECIFIED CHRONICITY, UNSPECIFIED GASTRITIS TYPE: ICD-10-CM

## 2023-02-21 DIAGNOSIS — I10 PRIMARY HYPERTENSION: ICD-10-CM

## 2023-02-21 DIAGNOSIS — K92.2 UPPER GI BLEED: ICD-10-CM

## 2023-02-21 LAB
ALBUMIN SERPL BCP-MCNC: 3.2 G/DL (ref 3.5–5)
ALBUMIN/GLOB SERPL: 1.1 {RATIO}
ALP SERPL-CCNC: 39 U/L (ref 45–115)
ALT SERPL W P-5'-P-CCNC: 19 U/L (ref 16–61)
ANION GAP SERPL CALCULATED.3IONS-SCNC: 6 MMOL/L (ref 7–16)
APTT PPP: 29.5 SECONDS (ref 25.2–37.3)
AST SERPL W P-5'-P-CCNC: 11 U/L (ref 15–37)
BASOPHILS # BLD AUTO: 0.02 K/UL (ref 0–0.2)
BASOPHILS NFR BLD AUTO: 0.3 % (ref 0–1)
BILIRUB SERPL-MCNC: 0.3 MG/DL (ref ?–1.2)
BUN SERPL-MCNC: 46 MG/DL (ref 7–18)
BUN/CREAT SERPL: 38 (ref 6–20)
CALCIUM SERPL-MCNC: 8.7 MG/DL (ref 8.5–10.1)
CHLORIDE SERPL-SCNC: 107 MMOL/L (ref 98–107)
CO2 SERPL-SCNC: 28 MMOL/L (ref 21–32)
CREAT SERPL-MCNC: 1.22 MG/DL (ref 0.7–1.3)
DIFFERENTIAL METHOD BLD: ABNORMAL
EGFR (NO RACE VARIABLE) (RUSH/TITUS): 61 ML/MIN/1.73M²
EOSINOPHIL # BLD AUTO: 0.07 K/UL (ref 0–0.5)
EOSINOPHIL NFR BLD AUTO: 0.9 % (ref 1–4)
ERYTHROCYTE [DISTWIDTH] IN BLOOD BY AUTOMATED COUNT: 13.2 % (ref 11.5–14.5)
GLOBULIN SER-MCNC: 3 G/DL (ref 2–4)
GLUCOSE SERPL-MCNC: 98 MG/DL (ref 74–106)
HCT VFR BLD AUTO: 28.3 % (ref 40–54)
HGB BLD-MCNC: 9.7 G/DL (ref 13.5–18)
IMM GRANULOCYTES # BLD AUTO: 0.02 K/UL (ref 0–0.04)
IMM GRANULOCYTES NFR BLD: 0.3 % (ref 0–0.4)
INR BLD: 1.1
LYMPHOCYTES # BLD AUTO: 2.39 K/UL (ref 1–4.8)
LYMPHOCYTES NFR BLD AUTO: 32.4 % (ref 27–41)
MAGNESIUM SERPL-MCNC: 2.2 MG/DL (ref 1.7–2.3)
MCH RBC QN AUTO: 30.8 PG (ref 27–31)
MCHC RBC AUTO-ENTMCNC: 34.3 G/DL (ref 32–36)
MCV RBC AUTO: 89.8 FL (ref 80–96)
MONOCYTES # BLD AUTO: 0.65 K/UL (ref 0–0.8)
MONOCYTES NFR BLD AUTO: 8.8 % (ref 2–6)
MPC BLD CALC-MCNC: 10.1 FL (ref 9.4–12.4)
NEUTROPHILS # BLD AUTO: 4.23 K/UL (ref 1.8–7.7)
NEUTROPHILS NFR BLD AUTO: 57.3 % (ref 53–65)
NRBC # BLD AUTO: 0 X10E3/UL
NRBC, AUTO (.00): 0 %
PLATELET # BLD AUTO: 204 K/UL (ref 150–400)
POC OCCULT BLOOD STOOL: POSITIVE
POTASSIUM SERPL-SCNC: 3.9 MMOL/L (ref 3.5–5.1)
PROT SERPL-MCNC: 6.2 G/DL (ref 6.4–8.2)
PROTHROMBIN TIME: 13.8 SECONDS (ref 11.7–14.7)
RBC # BLD AUTO: 3.15 M/UL (ref 4.6–6.2)
SARS-COV-2 RDRP RESP QL NAA+PROBE: NEGATIVE
SODIUM SERPL-SCNC: 137 MMOL/L (ref 136–145)
WBC # BLD AUTO: 7.38 K/UL (ref 4.5–11)

## 2023-02-21 PROCEDURE — 83735 ASSAY OF MAGNESIUM: CPT | Performed by: EMERGENCY MEDICINE

## 2023-02-21 PROCEDURE — 82272 OCCULT BLD FECES 1-3 TESTS: CPT

## 2023-02-21 PROCEDURE — 85025 COMPLETE CBC W/AUTO DIFF WBC: CPT | Performed by: EMERGENCY MEDICINE

## 2023-02-21 PROCEDURE — 99222 1ST HOSP IP/OBS MODERATE 55: CPT | Mod: ,,, | Performed by: HOSPITALIST

## 2023-02-21 PROCEDURE — 96374 THER/PROPH/DIAG INJ IV PUSH: CPT

## 2023-02-21 PROCEDURE — 85610 PROTHROMBIN TIME: CPT | Performed by: EMERGENCY MEDICINE

## 2023-02-21 PROCEDURE — 87635 SARS-COV-2 COVID-19 AMP PRB: CPT | Performed by: HOSPITALIST

## 2023-02-21 PROCEDURE — 85730 THROMBOPLASTIN TIME PARTIAL: CPT | Performed by: EMERGENCY MEDICINE

## 2023-02-21 PROCEDURE — 99222 PR INITIAL HOSPITAL CARE,LEVL II: ICD-10-PCS | Mod: ,,, | Performed by: HOSPITALIST

## 2023-02-21 PROCEDURE — 25000003 PHARM REV CODE 250: Performed by: EMERGENCY MEDICINE

## 2023-02-21 PROCEDURE — 63600175 PHARM REV CODE 636 W HCPCS: Performed by: EMERGENCY MEDICINE

## 2023-02-21 PROCEDURE — 80053 COMPREHEN METABOLIC PANEL: CPT | Performed by: EMERGENCY MEDICINE

## 2023-02-21 PROCEDURE — 93010 EKG 12-LEAD: ICD-10-PCS | Mod: ,,, | Performed by: INTERNAL MEDICINE

## 2023-02-21 PROCEDURE — 99284 PR EMERGENCY DEPT VISIT,LEVEL IV: ICD-10-PCS | Mod: ,,, | Performed by: EMERGENCY MEDICINE

## 2023-02-21 PROCEDURE — C9113 INJ PANTOPRAZOLE SODIUM, VIA: HCPCS | Performed by: EMERGENCY MEDICINE

## 2023-02-21 PROCEDURE — 99284 EMERGENCY DEPT VISIT MOD MDM: CPT | Mod: ,,, | Performed by: EMERGENCY MEDICINE

## 2023-02-21 PROCEDURE — 93010 ELECTROCARDIOGRAM REPORT: CPT | Mod: ,,, | Performed by: INTERNAL MEDICINE

## 2023-02-21 PROCEDURE — 93005 ELECTROCARDIOGRAM TRACING: CPT

## 2023-02-21 PROCEDURE — 99285 EMERGENCY DEPT VISIT HI MDM: CPT | Mod: 25

## 2023-02-21 PROCEDURE — 11000001 HC ACUTE MED/SURG PRIVATE ROOM

## 2023-02-21 RX ORDER — PANTOPRAZOLE SODIUM 40 MG/10ML
80 INJECTION, POWDER, LYOPHILIZED, FOR SOLUTION INTRAVENOUS
Status: COMPLETED | OUTPATIENT
Start: 2023-02-21 | End: 2023-02-21

## 2023-02-21 RX ORDER — TRAZODONE HYDROCHLORIDE 50 MG/1
50 TABLET ORAL NIGHTLY PRN
Status: DISCONTINUED | OUTPATIENT
Start: 2023-02-22 | End: 2023-02-23 | Stop reason: HOSPADM

## 2023-02-21 RX ORDER — ACETAMINOPHEN 325 MG/1
650 TABLET ORAL EVERY 4 HOURS PRN
Status: DISCONTINUED | OUTPATIENT
Start: 2023-02-21 | End: 2023-02-23 | Stop reason: HOSPADM

## 2023-02-21 RX ORDER — DEXTROSE 40 %
30 GEL (GRAM) ORAL
Status: DISCONTINUED | OUTPATIENT
Start: 2023-02-21 | End: 2023-02-21

## 2023-02-21 RX ORDER — IBUPROFEN 200 MG
16 TABLET ORAL
Status: DISCONTINUED | OUTPATIENT
Start: 2023-02-21 | End: 2023-02-23 | Stop reason: HOSPADM

## 2023-02-21 RX ORDER — NALOXONE HCL 0.4 MG/ML
0.02 VIAL (ML) INJECTION
Status: DISCONTINUED | OUTPATIENT
Start: 2023-02-21 | End: 2023-02-23 | Stop reason: HOSPADM

## 2023-02-21 RX ORDER — IBUPROFEN 200 MG
24 TABLET ORAL
Status: DISCONTINUED | OUTPATIENT
Start: 2023-02-21 | End: 2023-02-23 | Stop reason: HOSPADM

## 2023-02-21 RX ORDER — SODIUM CHLORIDE 0.9 % (FLUSH) 0.9 %
10 SYRINGE (ML) INJECTION EVERY 12 HOURS PRN
Status: DISCONTINUED | OUTPATIENT
Start: 2023-02-21 | End: 2023-02-23 | Stop reason: HOSPADM

## 2023-02-21 RX ORDER — SODIUM CHLORIDE, SODIUM LACTATE, POTASSIUM CHLORIDE, CALCIUM CHLORIDE 600; 310; 30; 20 MG/100ML; MG/100ML; MG/100ML; MG/100ML
INJECTION, SOLUTION INTRAVENOUS CONTINUOUS
Status: DISCONTINUED | OUTPATIENT
Start: 2023-02-22 | End: 2023-02-23 | Stop reason: HOSPADM

## 2023-02-21 RX ORDER — METOCLOPRAMIDE HYDROCHLORIDE 5 MG/ML
10 INJECTION INTRAMUSCULAR; INTRAVENOUS EVERY 6 HOURS PRN
Status: DISCONTINUED | OUTPATIENT
Start: 2023-02-21 | End: 2023-02-23 | Stop reason: HOSPADM

## 2023-02-21 RX ORDER — ONDANSETRON 2 MG/ML
4 INJECTION INTRAMUSCULAR; INTRAVENOUS EVERY 8 HOURS PRN
Status: DISCONTINUED | OUTPATIENT
Start: 2023-02-21 | End: 2023-02-23 | Stop reason: HOSPADM

## 2023-02-21 RX ORDER — POLYETHYLENE GLYCOL 3350 17 G/17G
17 POWDER, FOR SOLUTION ORAL DAILY PRN
Status: DISCONTINUED | OUTPATIENT
Start: 2023-02-21 | End: 2023-02-23 | Stop reason: HOSPADM

## 2023-02-21 RX ORDER — TALC
6 POWDER (GRAM) TOPICAL NIGHTLY PRN
Status: DISCONTINUED | OUTPATIENT
Start: 2023-02-21 | End: 2023-02-21

## 2023-02-21 RX ORDER — GLUCAGON 1 MG
1 KIT INJECTION
Status: DISCONTINUED | OUTPATIENT
Start: 2023-02-21 | End: 2023-02-23 | Stop reason: HOSPADM

## 2023-02-21 RX ORDER — DEXTROSE 40 %
15 GEL (GRAM) ORAL
Status: DISCONTINUED | OUTPATIENT
Start: 2023-02-21 | End: 2023-02-21 | Stop reason: CLARIF

## 2023-02-21 RX ORDER — AMLODIPINE BESYLATE 10 MG/1
10 TABLET ORAL DAILY
Status: DISCONTINUED | OUTPATIENT
Start: 2023-02-22 | End: 2023-02-22

## 2023-02-21 RX ORDER — CARVEDILOL 3.12 MG/1
12.5 TABLET ORAL 2 TIMES DAILY
Status: DISCONTINUED | OUTPATIENT
Start: 2023-02-22 | End: 2023-02-22

## 2023-02-21 RX ADMIN — PANTOPRAZOLE SODIUM 80 MG: 40 INJECTION, POWDER, FOR SOLUTION INTRAVENOUS at 08:02

## 2023-02-21 RX ADMIN — PANTOPRAZOLE SODIUM 8 MG/HR: 40 INJECTION, POWDER, FOR SOLUTION INTRAVENOUS at 09:02

## 2023-02-22 ENCOUNTER — ANESTHESIA (OUTPATIENT)
Dept: GASTROENTEROLOGY | Facility: HOSPITAL | Age: 78
DRG: 369 | End: 2023-02-22
Payer: MEDICARE

## 2023-02-22 ENCOUNTER — ANESTHESIA EVENT (OUTPATIENT)
Dept: GASTROENTEROLOGY | Facility: HOSPITAL | Age: 78
DRG: 369 | End: 2023-02-22
Payer: MEDICARE

## 2023-02-22 LAB
ABORH RETYPE: NORMAL
ANION GAP SERPL CALCULATED.3IONS-SCNC: 6 MMOL/L (ref 7–16)
BUN SERPL-MCNC: 36 MG/DL (ref 7–18)
BUN/CREAT SERPL: 30 (ref 6–20)
CALCIUM SERPL-MCNC: 8.1 MG/DL (ref 8.5–10.1)
CHLORIDE SERPL-SCNC: 112 MMOL/L (ref 98–107)
CO2 SERPL-SCNC: 27 MMOL/L (ref 21–32)
CREAT SERPL-MCNC: 1.21 MG/DL (ref 0.7–1.3)
EGFR (NO RACE VARIABLE) (RUSH/TITUS): 62 ML/MIN/1.73M²
GLUCOSE SERPL-MCNC: 116 MG/DL (ref 74–106)
HCT VFR BLD AUTO: 25.4 % (ref 40–54)
HCT VFR BLD AUTO: 26.5 % (ref 40–54)
HCT VFR BLD AUTO: 26.9 % (ref 40–54)
HGB BLD-MCNC: 8.4 G/DL (ref 13.5–18)
HGB BLD-MCNC: 8.8 G/DL (ref 13.5–18)
HGB BLD-MCNC: 9.1 G/DL (ref 13.5–18)
INDIRECT COOMBS: NORMAL
POTASSIUM SERPL-SCNC: 3.6 MMOL/L (ref 3.5–5.1)
RH BLD: NORMAL
SODIUM SERPL-SCNC: 141 MMOL/L (ref 136–145)

## 2023-02-22 PROCEDURE — 86900 BLOOD TYPING SEROLOGIC ABO: CPT

## 2023-02-22 PROCEDURE — 80048 BASIC METABOLIC PNL TOTAL CA: CPT

## 2023-02-22 PROCEDURE — 27000284 HC CANNULA NASAL: Performed by: NURSE ANESTHETIST, CERTIFIED REGISTERED

## 2023-02-22 PROCEDURE — 88342 IMHCHEM/IMCYTCHM 1ST ANTB: CPT | Mod: 26,,, | Performed by: PATHOLOGY

## 2023-02-22 PROCEDURE — 11000001 HC ACUTE MED/SURG PRIVATE ROOM

## 2023-02-22 PROCEDURE — C9113 INJ PANTOPRAZOLE SODIUM, VIA: HCPCS

## 2023-02-22 PROCEDURE — 99223 PR INITIAL HOSPITAL CARE,LEVL III: ICD-10-PCS | Mod: 25,,, | Performed by: INTERNAL MEDICINE

## 2023-02-22 PROCEDURE — 88305 TISSUE EXAM BY PATHOLOGIST: CPT | Mod: TC,SUR | Performed by: INTERNAL MEDICINE

## 2023-02-22 PROCEDURE — 63600175 PHARM REV CODE 636 W HCPCS

## 2023-02-22 PROCEDURE — 63600175 PHARM REV CODE 636 W HCPCS: Performed by: NURSE ANESTHETIST, CERTIFIED REGISTERED

## 2023-02-22 PROCEDURE — D9220A PRA ANESTHESIA: ICD-10-PCS | Mod: ,,, | Performed by: NURSE ANESTHETIST, CERTIFIED REGISTERED

## 2023-02-22 PROCEDURE — 88305 SURGICAL PATHOLOGY: ICD-10-PCS | Mod: 26,,, | Performed by: PATHOLOGY

## 2023-02-22 PROCEDURE — 88342 SURGICAL PATHOLOGY: ICD-10-PCS | Mod: 26,,, | Performed by: PATHOLOGY

## 2023-02-22 PROCEDURE — 43239 PR EGD, FLEX, W/BIOPSY, SGL/MULTI: ICD-10-PCS | Mod: ,,, | Performed by: INTERNAL MEDICINE

## 2023-02-22 PROCEDURE — 99223 1ST HOSP IP/OBS HIGH 75: CPT | Mod: 25,,, | Performed by: INTERNAL MEDICINE

## 2023-02-22 PROCEDURE — 43239 EGD BIOPSY SINGLE/MULTIPLE: CPT | Mod: ,,, | Performed by: INTERNAL MEDICINE

## 2023-02-22 PROCEDURE — 43239 EGD BIOPSY SINGLE/MULTIPLE: CPT | Performed by: INTERNAL MEDICINE

## 2023-02-22 PROCEDURE — 99232 PR SUBSEQUENT HOSPITAL CARE,LEVL II: ICD-10-PCS | Mod: ,,, | Performed by: INTERNAL MEDICINE

## 2023-02-22 PROCEDURE — 99232 SBSQ HOSP IP/OBS MODERATE 35: CPT | Mod: ,,, | Performed by: INTERNAL MEDICINE

## 2023-02-22 PROCEDURE — 88305 TISSUE EXAM BY PATHOLOGIST: CPT | Mod: 26,,, | Performed by: PATHOLOGY

## 2023-02-22 PROCEDURE — D9220A PRA ANESTHESIA: Mod: ,,, | Performed by: NURSE ANESTHETIST, CERTIFIED REGISTERED

## 2023-02-22 PROCEDURE — 99900037 HC PT THERAPY SCREENING (STAT)

## 2023-02-22 PROCEDURE — 25000003 PHARM REV CODE 250: Performed by: NURSE ANESTHETIST, CERTIFIED REGISTERED

## 2023-02-22 PROCEDURE — 85014 HEMATOCRIT: CPT

## 2023-02-22 PROCEDURE — 25000003 PHARM REV CODE 250

## 2023-02-22 RX ORDER — FENTANYL CITRATE 50 UG/ML
INJECTION, SOLUTION INTRAMUSCULAR; INTRAVENOUS
Status: DISCONTINUED | OUTPATIENT
Start: 2023-02-22 | End: 2023-02-22

## 2023-02-22 RX ORDER — PROPOFOL 10 MG/ML
VIAL (ML) INTRAVENOUS
Status: DISCONTINUED | OUTPATIENT
Start: 2023-02-22 | End: 2023-02-22

## 2023-02-22 RX ORDER — LIDOCAINE HYDROCHLORIDE 20 MG/ML
INJECTION, SOLUTION EPIDURAL; INFILTRATION; INTRACAUDAL; PERINEURAL
Status: DISCONTINUED | OUTPATIENT
Start: 2023-02-22 | End: 2023-02-22

## 2023-02-22 RX ADMIN — PANTOPRAZOLE SODIUM 8 MG/HR: 40 INJECTION, POWDER, FOR SOLUTION INTRAVENOUS at 04:02

## 2023-02-22 RX ADMIN — PANTOPRAZOLE SODIUM 8 MG/HR: 40 INJECTION, POWDER, FOR SOLUTION INTRAVENOUS at 10:02

## 2023-02-22 RX ADMIN — FENTANYL CITRATE 100 MCG: 50 INJECTION INTRAMUSCULAR; INTRAVENOUS at 02:02

## 2023-02-22 RX ADMIN — PANTOPRAZOLE SODIUM 8 MG/HR: 40 INJECTION, POWDER, FOR SOLUTION INTRAVENOUS at 09:02

## 2023-02-22 RX ADMIN — SODIUM CHLORIDE, POTASSIUM CHLORIDE, SODIUM LACTATE AND CALCIUM CHLORIDE: 600; 310; 30; 20 INJECTION, SOLUTION INTRAVENOUS at 10:02

## 2023-02-22 RX ADMIN — LIDOCAINE HYDROCHLORIDE 60 MG: 20 INJECTION, SOLUTION INTRAVENOUS at 02:02

## 2023-02-22 RX ADMIN — SODIUM CHLORIDE, POTASSIUM CHLORIDE, SODIUM LACTATE AND CALCIUM CHLORIDE: 600; 310; 30; 20 INJECTION, SOLUTION INTRAVENOUS at 12:02

## 2023-02-22 RX ADMIN — SODIUM CHLORIDE, POTASSIUM CHLORIDE, SODIUM LACTATE AND CALCIUM CHLORIDE: 600; 310; 30; 20 INJECTION, SOLUTION INTRAVENOUS at 09:02

## 2023-02-22 RX ADMIN — PANTOPRAZOLE SODIUM 8 MG/HR: 40 INJECTION, POWDER, FOR SOLUTION INTRAVENOUS at 05:02

## 2023-02-22 RX ADMIN — PROPOFOL 120 MG: 10 INJECTION, EMULSION INTRAVENOUS at 02:02

## 2023-02-22 RX ADMIN — SODIUM CHLORIDE: 9 INJECTION, SOLUTION INTRAVENOUS at 02:02

## 2023-02-22 NOTE — H&P
[facility] Medicine  History & Physical    Patient Name: Ruben Alarcon  MRN: [id number]  Patient Class: IP- Inpatient  Admission Date: 2/21/2023  Attending Physician: Christian Noe MD   Primary Care Provider: ULYSSES Gabriel         Patient information was obtained from patient, relative(s), past medical records and ER records.     Subjective:     Principal Problem:Upper GI bleed    Chief Complaint:   Chief Complaint   Patient presents with    GI Problem     Pt states he vomited blood this morning - at at approx 1500 today had dark black stool        HPI: Patient is a 77 y.o.m. that presents to the ED for hematemesis and black stools associated with lightheadedness. Patient reports he awoke today around midnight with a feeling of a dirty mouth and therefore got out of bed to brush his teeth. Patient vomited while brushing his teeth twice. Patient described the vomit as a large amount of blood with clots. Patient reports some lightheadedness after vomiting. Patient also reports 3 episodes of black malodorous stool with one of the episodes containing a very small amount for bright red blood. Patient reports some slight bloating but denies continuous use of NSAIDs, nausea, diarrhea, or Abd pain. Patient is taking Eliquis for Afib but has not had a documented episode of Afib since ablation. Patient followed by Ruthy Davis (cardiology) and has been kept on Eliquis due to a CHADSVASC score of 3. Patient admitted to the hospital for further care and management.    ED Course: FOBT positive, CBC demonstrates a normocytic anemia, CMP demonstrated elevated BUM and BUM/Cr ratio, along with hypoproteinemia, hypoalbuminemia. Mag and Coags wnl and COVID test was negative.        Past Medical History:   Diagnosis Date    Atrial fibrillation     Bradycardia     COVID-19 11/1/2021    Hyperlipidemia     Hypertension     Valvular heart disease     Type: TR    White coat  syndrome with diagnosis of hypertension        Past Surgical History:   Procedure Laterality Date    ABLATION      Dr[facility] at [facility]    SHOULDER SURGERY Left     TONSILLECTOMY         Review of patient's allergies indicates:  No Known Allergies    No current facility-administered medications on file prior to encounter.     Current Outpatient Medications on File Prior to Encounter   Medication Sig    amLODIPine (NORVASC) 10 MG tablet Take 1 tablet (10 mg total) by mouth once daily.    apixaban (ELIQUIS) 5 mg Tab Take 1 tablet (5 mg total) by mouth 2 (two) times daily.    carvediloL (COREG) 12.5 MG tablet Take 1 tablet (12.5 mg total) by mouth 2 (two) times daily.    omega-3 fatty acids/fish oil (FISH OIL-OMEGA-3 FATTY ACIDS) 300-1,000 mg capsule Take 1 capsule by mouth once daily.     Family History       Problem Relation (Age of Onset)    Bladder Cancer Brother    Cancer Sister, Brother, Brother    Hypertension Mother, Father    Leukemia Sister    Prostate cancer Brother          Tobacco Use    Smoking status: Never    Smokeless tobacco: Never   Substance and Sexual Activity    Alcohol use: Never    Drug use: Never    Sexual activity: Yes     Partners: Female     Review of Systems   Constitutional:  Negative for activity change, appetite change, chills, diaphoresis and fever.   HENT:  Negative for congestion, nosebleeds, postnasal drip, rhinorrhea, sore throat and trouble swallowing.    Eyes:  Negative for visual disturbance.   Respiratory:  Negative for cough, chest tightness and shortness of breath.    Cardiovascular:  Negative for chest pain and leg swelling.   Gastrointestinal:  Positive for abdominal distention, blood in stool and vomiting. Negative for abdominal pain, constipation, diarrhea and nausea.   Genitourinary:  Negative for dysuria and hematuria.   Musculoskeletal:  Negative for joint swelling.   Skin:  Negative for rash.   Neurological:  Positive for dizziness and light-headedness.  Negative for weakness and headaches.   Psychiatric/Behavioral:  Negative for confusion.    All other systems reviewed and are negative.  Objective:     Vital Signs (Most Recent):  Temp: 98.4 °F (36.9 °C) (02/21/23 2149)  Pulse: 85 (02/21/23 2149)  Resp: 20 (02/21/23 2149)  BP: 128/80 (02/21/23 2149)  SpO2: 98 % (02/21/23 2149) Vital Signs (24h Range):  Temp:  [97.9 °F (36.6 °C)-98.4 °F (36.9 °C)] 98.4 °F (36.9 °C)  Pulse:  [80-97] 85  Resp:  [14-21] 20  SpO2:  [95 %-99 %] 98 %  BP: (128-154)/(78-93) 128/80     Weight: 68 kg (150 lb)  Body mass index is 24.21 kg/m².    Physical Exam  Vitals and nursing note reviewed.   Constitutional:       General: He is not in acute distress.     Appearance: Normal appearance. He is not ill-appearing, toxic-appearing or diaphoretic.   HENT:      Head: Normocephalic and atraumatic.      Nose: Nose normal. No congestion or rhinorrhea.      Mouth/Throat:      Mouth: Mucous membranes are moist.      Pharynx: Oropharynx is clear. No oropharyngeal exudate or posterior oropharyngeal erythema.   Eyes:      Conjunctiva/sclera: Conjunctivae normal.      Pupils: Pupils are equal, round, and reactive to light.   Cardiovascular:      Rate and Rhythm: Normal rate and regular rhythm.      Pulses: Normal pulses.      Heart sounds: Normal heart sounds. No murmur heard.    No friction rub.   Pulmonary:      Effort: Pulmonary effort is normal. No respiratory distress.      Breath sounds: Normal breath sounds. No wheezing, rhonchi or rales.   Abdominal:      General: Bowel sounds are normal. There is no distension.      Tenderness: There is no abdominal tenderness. There is no guarding.   Musculoskeletal:      Cervical back: Neck supple. No tenderness.      Right lower leg: No edema.      Left lower leg: No edema.   Lymphadenopathy:      Cervical: No cervical adenopathy.   Skin:     General: Skin is warm and dry.      Capillary Refill: Capillary refill takes less than 2 seconds.   Neurological:       General: No focal deficit present.      Mental Status: He is alert and oriented to person, place, and time.   Psychiatric:         Mood and Affect: Mood normal.         Behavior: Behavior normal.         Thought Content: Thought content normal.         Judgment: Judgment normal.         CRANIAL NERVES     CN III, IV, VI   Pupils are equal, round, and reactive to light.     Significant Labs: All pertinent labs within the past 24 hours have been reviewed.    Significant Imaging: I have reviewed all pertinent imaging results/findings within the past 24 hours.    Assessment/Plan:     * Upper GI bleed  - FOBT positive  - GI consult  - LR @ 125 cc/hr  - Protonix infusion  - NPO  - H/H Q6H  - type and screen  - cardiac monitoring   - Holding home Eliquis  - KUB preliminary read unremarkable         Acute blood loss anemia  Due to upper GI bleed, H/H on admission 9.7/28.9  - fluids as above  - type and screen as above  - transfuse if Hgb falls below 7        Atrial fibrillation  Patient has no documented episodes of Afib since ablation  - [id number] Score: 2   - HASBLED Score: 3  - holding home Eliquis due to bleed  - continuing home carvedilol 12.5mg BID  - EKG shows SR with RBBB        Hypertension  Continuing home carvedilol and amlodipine         VTE Risk Mitigation (From admission, onward)         Ordered     Place RAISA hose  Until discontinued         02/21/23 2255     Reason for No Pharmacological VTE Prophylaxis  Once        Question:  Reasons:  Answer:  Active Bleeding    02/21/23 2255     IP VTE HIGH RISK PATIENT  Once         02/21/23 2255     Place sequential compression device  Until discontinued         02/21/23 2255                   Jasper Fields DO  Department of Hospital Medicine   [facility]

## 2023-02-22 NOTE — DISCHARGE INSTRUCTIONS
Continue home medications except,  - Start taking Protonix 40mg twice daily for 90 days.  - Holding Eliquis on day of discharge, restart tomorrow 2/24/23  - Stop Norvasc 10mg daily due to normal blood pressure.    - Followup with PCP in 1 week, repeat EGD in 3 months on 5/9/23.      EGD   Procedure Date  2/22/23     Impression  Overall Impression:   - Grade C reflux esophagitis with ulceration   - Small nodule associated with esophagitis, biopsied  - 4-cm hiatal hernia  - The exam was otherwise normal  - No blood/bleeding, varices, puma lesion, PUD, mass, or AVM     Recommendation  Await pathology results is recommended  Suspect esophagitis is the culprit for recent bleeding while on Eliquis  Recommend PPI BID for 3 months then daily indefinitely   Schedule repeat EGD is recommended in 3 months to ensure healing and rule out mcgowan's esophagus

## 2023-02-22 NOTE — ANESTHESIA PREPROCEDURE EVALUATION
02/22/2023  Ruben Alarcon is a 77 y.o., male.  Patient Active Problem List   Diagnosis    Atrial fibrillation    Bradycardia    Hyperlipidemia    Hypertension    Valvular heart disease    Hematoma of right lower leg    Pain and swelling of right lower leg    Upper GI bleed    Acute blood loss anemia     Past Medical History:   Diagnosis Date    Atrial fibrillation     Bradycardia     COVID-19 11/1/2021    Hyperlipidemia     Hypertension     Valvular heart disease     Type: TR    White coat syndrome with diagnosis of hypertension        Past Surgical History:   Procedure Laterality Date    ABL[facility]    SHOULDER SURGERY Left     TONSILLECTOMY         Family History   Problem Relation Age of Onset    Hypertension Mother     Hypertension Father     Cancer Sister     Leukemia Sister     Cancer Brother     Prostate cancer Brother     Cancer Brother     Bladder Cancer Brother        Social History     Socioeconomic History    Marital status:    Tobacco Use    Smoking status: Never    Smokeless tobacco: Never   Substance and Sexual Activity    Alcohol use: Never    Drug use: Never    Sexual activity: Yes     Partners: Female     Social Determinants of Health     Financial Resource Strain: Low Risk     Difficulty of Paying Living Expenses: Not hard at all   Food Insecurity: No Food Insecurity    Worried About Running Out of Food in the Last Year: Never true    Ran Out of Food in the Last Year: Never true   Transportation Needs: No Transportation Needs    Lack of Transportation (Medical): No    Lack of Transportation (Non-Medical): No   Physical Activity: Inactive    Days of Exercise per Week: 0 days    Minutes of Exercise per Session: 0 min   Stress: No Stress Concern Present    Feeling of Stress : Not at all   Social Connections: Socially  Integrated    Frequency of Communication with Friends and Family: More than three times a week    Frequency of Social Gatherings with Friends and Family: More than three times a week    Attends Je[employer] witness Services: More than 4 times per year    Active Member of Clubs or Organizations: Yes    Attends Club or Organization Meetings: More than 4 times per year    Marital Status:    Housing Stability: Low Risk     Unable to Pay for Housing in the Last Year: No    Number of Places Lived in the Last Year: 1    Unstable Housing in the Last Year: No       Current Facility-Administered Medications   Medication Dose Route Frequency Provider Last Rate Last Admin    acetaminophen tablet 650 mg  650 mg Oral Q4H PRN Jasper Fields DO        dextrose 10% bolus 125 mL 125 mL  12.5 g Intravenous PRN Jasper Fields DO        dextrose 10% bolus 250 mL 250 mL  25 g Intravenous PRN Jasper Fields DO        glucagon (human recombinant) injection 1 mg  1 mg Intramuscular PRN Jasper Fields DO        glucose chewable tablet 16 g  16 g Oral PRN Rehmat U MD Hasmukh        glucose chewable tablet 24 g  24 g Oral PRN Rehmat U MD Hasmukh        lactated ringers infusion   Intravenous Continuous Jasper Fields  mL/hr at 02/22/23 0052 New Bag at 02/22/23 0052    metoclopramide HCl injection 10 mg  10 mg Intravenous Q6H PRN Jasper Fields DO        naloxone 0.4 mg/mL injection 0.02 mg  0.02 mg Intravenous PRN Jasper Fields DO        ondansetron injection 4 mg  4 mg Intravenous Q8H PRN Jasper Fields DO        pantoprazole (PROTONIX) 40 mg in sodium chloride 0.9 % 100 mL IVPB (MB+)  8 mg/hr Intravenous Continuous Jasper Fields DO 20 mL/hr at 02/22/23 0521 8 mg/hr at 02/22/23 0521    polyethylene glycol packet 17 g  17 g Oral Daily PRN Jasper Fields DO        sodium chloride 0.9% flush 10 mL  10 mL Intravenous Q12H PRN Jasper Fields DO        traZODone tablet 50 mg  50 mg Oral Nightly PRN Heather AKINS  MD [place]           Review of patient's allergies indicates:  No Known Allergies        Pre-op Assessment    I have reviewed the Patient Summary Reports.     I have reviewed the Nursing Notes. I have reviewed the NPO Status.   I have reviewed the Medications.     Review of Systems  Anesthesia Hx:  No problems with previous Anesthesia    Hematology/Oncology:         -- Anemia:   Cardiovascular:   Hypertension Valvular problems/Murmurs Dysrhythmias atrial fibrillation        Physical Exam  General: Well nourished, Alert, Oriented and Cooperative    Airway:  Mallampati: II   Mouth Opening: Normal  Neck ROM: Normal ROM    Dental:  Intact    Chest/Lungs:  Normal Respiratory Rate    Heart:  Rate: Normal        Anesthesia Plan  Type of Anesthesia, risks & benefits discussed:    Anesthesia Type: Gen Natural Airway, MAC  Intra-op Monitoring Plan: Standard ASA Monitors  Post Op Pain Control Plan: multimodal analgesia and IV/PO Opioids PRN  Induction:  IV  Informed Consent: Informed consent signed with the Patient and all parties understand the risks and agree with anesthesia plan.  All questions answered. Patient consented to blood products? Yes  ASA Score: 3  Day of Surgery Review of History & Physical: I have interviewed and examined the patient. I have reviewed the patient's H&P dated: There are no significant changes.     Ready For Surgery From Anesthesia Perspective.     .

## 2023-02-22 NOTE — TRANSFER OF CARE
"Anesthesia Transfer of Care Note    Patient: Ruben Alarcon    Procedure(s) Performed: * EGD with biopsy *    Patient location: GI    Anesthesia Type: general    Transport from OR: Transported from OR on room air with adequate spontaneous ventilation. Continuous ECG monitoring in transport. Continuous SpO2 monitoring in transport    Post pain: adequate analgesia    Post assessment: no apparent anesthetic complications    Post vital signs: stable    Level of consciousness: sedated and responds to stimulation    Nausea/Vomiting: no nausea/vomiting    Complications: none    Transfer of care protocol was followedComments: Good SV continue, NAD, VSS, RTRN      Last vitals:   Visit Vitals  BP (!) 95/51   Pulse 75   Temp 36.7 °C (98 °F)   Resp 14   Ht 5' 6" (1.676 m)   Wt 68 kg (150 lb)   SpO2 97%   BMI 24.21 kg/m²     "

## 2023-02-22 NOTE — PROGRESS NOTES
[facility] Medicine  Progress Note    Patient Name: Ruben Alarcon  MRN: [id number]  Patient Class: IP- Inpatient   Admission Date: 2/21/2023  Length of Stay: 1 days  Attending Physician: Christian Noe MD  Primary Care Provider: ULYSSES Gabriel        Subjective:     Principal Problem:Upper GI bleed        HPI:  Patient is a 77 y.o.m. that presents to the ED for hematemesis and black stools associated with lightheadedness. Patient reports he awoke today around midnight with a feeling of a dirty mouth and therefore got out of bed to brush his teeth. Patient vomited while brushing his teeth twice. Patient described the vomit as a large amount of blood with clots. Patient reports some lightheadedness after vomiting. Patient also reports 3 episodes of black malodorous stool with one of the episodes containing a very small amount for bright red blood. Patient reports some slight bloating but denies continuous use of NSAIDs, nausea, diarrhea, or Abd pain. Patient is taking Eliquis for Afib but has not had a documented episode of Afib since ablation. Patient followed by Ruthy Davis (cardiology) and has been kept on Eliquis due to a CHADSVASC score of 3. Patient admitted to the hospital for further care and management.    ED Course: FOBT positive, CBC demonstrates a normocytic anemia, CMP demonstrated elevated BUM and BUM/Cr ratio, along with hypoproteinemia, hypoalbuminemia. Mag and Coags wnl and COVID test was negative.        Overview/Hospital Course:  No notes on file    Interval History: No acute events O/N. NPO since midnight for potential endoscopy by GI today. Patient had 1 bowel movement O/N with black stools but denies lightheadedness or dizziness afterwards. Denies further hematemesis, CP, SOB, or abdominal pain. Pending GI consult. Holding home Coreg and Norsvac due to soft BP, consider restart if hypertensive.    Review of Systems   Constitutional:  Negative for  activity change, appetite change, chills, diaphoresis and fever.   HENT:  Negative for congestion, nosebleeds, postnasal drip, rhinorrhea, sore throat and trouble swallowing.    Eyes:  Negative for visual disturbance.   Respiratory:  Negative for cough, chest tightness and shortness of breath.    Cardiovascular:  Negative for chest pain and leg swelling.   Gastrointestinal:  Positive for abdominal distention, blood in stool and vomiting. Negative for abdominal pain, constipation, diarrhea and nausea.   Genitourinary:  Negative for dysuria and hematuria.   Musculoskeletal:  Negative for joint swelling.   Skin:  Negative for rash.   Neurological:  Positive for dizziness and light-headedness. Negative for weakness and headaches.   Psychiatric/Behavioral:  Negative for confusion.    All other systems reviewed and are negative.  Objective:     Vital Signs (Most Recent):  Temp: 98.7 °F (37.1 °C) (02/22/23 0400)  Pulse: 76 (02/22/23 0400)  Resp: 18 (02/22/23 0400)  BP: 116/67 (02/22/23 0400)  SpO2: 98 % (02/22/23 0400) Vital Signs (24h Range):  Temp:  [97.9 °F (36.6 °C)-98.7 °F (37.1 °C)] 98.7 °F (37.1 °C)  Pulse:  [76-97] 76  Resp:  [14-21] 18  SpO2:  [95 %-99 %] 98 %  BP: (116-154)/(67-93) 116/67     Weight: 68 kg (150 lb)  Body mass index is 24.21 kg/m².    Intake/Output Summary (Last 24 hours) at 2/22/2023 1140  Last data filed at 2/22/2023 0533  Gross per 24 hour   Intake 756.42 ml   Output --   Net 756.42 ml      Physical Exam  Vitals and nursing note reviewed.   Constitutional:       General: He is not in acute distress.     Appearance: Normal appearance. He is not ill-appearing, toxic-appearing or diaphoretic.   HENT:      Head: Normocephalic and atraumatic.      Nose: Nose normal. No congestion or rhinorrhea.      Mouth/Throat:      Mouth: Mucous membranes are moist.      Pharynx: Oropharynx is clear. No oropharyngeal exudate or posterior oropharyngeal erythema.   Eyes:      Conjunctiva/sclera: Conjunctivae  normal.      Pupils: Pupils are equal, round, and reactive to light.   Cardiovascular:      Rate and Rhythm: Normal rate and regular rhythm.      Pulses: Normal pulses.      Heart sounds: Normal heart sounds. No murmur heard.    No friction rub.   Pulmonary:      Effort: Pulmonary effort is normal. No respiratory distress.      Breath sounds: Normal breath sounds. No wheezing, rhonchi or rales.   Abdominal:      General: Bowel sounds are normal. There is no distension.      Tenderness: There is no abdominal tenderness. There is no guarding.   Musculoskeletal:      Cervical back: Neck supple. No tenderness.      Right lower leg: No edema.      Left lower leg: No edema.   Lymphadenopathy:      Cervical: No cervical adenopathy.   Skin:     General: Skin is warm and dry.      Capillary Refill: Capillary refill takes less than 2 seconds.   Neurological:      General: No focal deficit present.      Mental Status: He is alert and oriented to person, place, and time.   Psychiatric:         Mood and Affect: Mood normal.         Behavior: Behavior normal.         Thought Content: Thought content normal.         Judgment: Judgment normal.       Significant Labs: All pertinent labs within the past 24 hours have been reviewed.    Significant Imaging: I have reviewed all pertinent imaging results/findings within the past 24 hours.      Assessment/Plan:      * Upper GI bleed  - FOBT positive  - GI consult, appreciate assistance  - LR @ 125 cc/hr  - Protonix infusion  - NPO  - H/H Q6H  - type and screen  - cardiac monitoring   - Holding home Eliquis  - KUB preliminary read unremarkable         Acute blood loss anemia  Due to upper GI bleed, H/H on admission 9.7/28.9  - fluids as above  - type and screen as above  - transfuse if Hgb falls below 7        Atrial fibrillation  Patient has no documented episodes of Afib since ablation  - [id number] Score: 2   - HASBLED Score: 3  - holding home Eliquis due to bleed  - continuing home  carvedilol 12.5mg BID  - EKG shows SR with RBBB        Hypertension  holding home carvedilol and amlodipine due to /67   Restart as needed        VTE Risk Mitigation (From admission, onward)         Ordered     Place RAISA hose  Until discontinued         02/21/23 2255     Reason for No Pharmacological VTE Prophylaxis  Once        Question:  Reasons:  Answer:  Active Bleeding    02/21/23 2255     IP VTE HIGH RISK PATIENT  Once         02/21/23 2255     Place sequential compression device  Until discontinued         02/21/23 2255                Discharge Planning   ALEAH:      Code Status: Full Code   Is the patient medically ready for discharge?:     Reason for patient still in hospital (select all that apply): Patient trending condition, Laboratory test, Treatment, Consult recommendations, PT / OT recommendations and Pending disposition  Discharge Plan A: Home                  Liza Andre DO  Department of Hospital Medicine   [facility]

## 2023-02-22 NOTE — ASSESSMENT & PLAN NOTE
Patient has no documented episodes of Afib since ablation  - [id number] Score: 2   - HASBLED Score: 3  - holding home Eliquis due to bleed  - continuing home carvedilol 12.5mg BID  - EKG shows SR with RBBB

## 2023-02-22 NOTE — SUBJECTIVE & OBJECTIVE
Interval History: No acute events O/N. NPO since midnight for potential endoscopy by GI today. Patient had 1 bowel movement O/N with black stools but denies lightheadedness or dizziness afterwards. Denies further hematemesis, CP, SOB, or abdominal pain. Pending GI consult. Holding home Coreg and Norsvac due to soft BP, consider restart if hypertensive.    Review of Systems   Constitutional:  Negative for activity change, appetite change, chills, diaphoresis and fever.   HENT:  Negative for congestion, nosebleeds, postnasal drip, rhinorrhea, sore throat and trouble swallowing.    Eyes:  Negative for visual disturbance.   Respiratory:  Negative for cough, chest tightness and shortness of breath.    Cardiovascular:  Negative for chest pain and leg swelling.   Gastrointestinal:  Positive for abdominal distention, blood in stool and vomiting. Negative for abdominal pain, constipation, diarrhea and nausea.   Genitourinary:  Negative for dysuria and hematuria.   Musculoskeletal:  Negative for joint swelling.   Skin:  Negative for rash.   Neurological:  Positive for dizziness and light-headedness. Negative for weakness and headaches.   Psychiatric/Behavioral:  Negative for confusion.    All other systems reviewed and are negative.  Objective:     Vital Signs (Most Recent):  Temp: 98.7 °F (37.1 °C) (02/22/23 0400)  Pulse: 76 (02/22/23 0400)  Resp: 18 (02/22/23 0400)  BP: 116/67 (02/22/23 0400)  SpO2: 98 % (02/22/23 0400) Vital Signs (24h Range):  Temp:  [97.9 °F (36.6 °C)-98.7 °F (37.1 °C)] 98.7 °F (37.1 °C)  Pulse:  [76-97] 76  Resp:  [14-21] 18  SpO2:  [95 %-99 %] 98 %  BP: (116-154)/(67-93) 116/67     Weight: 68 kg (150 lb)  Body mass index is 24.21 kg/m².    Intake/Output Summary (Last 24 hours) at 2/22/2023 1140  Last data filed at 2/22/2023 0533  Gross per 24 hour   Intake 756.42 ml   Output --   Net 756.42 ml      Physical Exam  Vitals and nursing note reviewed.   Constitutional:       General: He is not in acute  distress.     Appearance: Normal appearance. He is not ill-appearing, toxic-appearing or diaphoretic.   HENT:      Head: Normocephalic and atraumatic.      Nose: Nose normal. No congestion or rhinorrhea.      Mouth/Throat:      Mouth: Mucous membranes are moist.      Pharynx: Oropharynx is clear. No oropharyngeal exudate or posterior oropharyngeal erythema.   Eyes:      Conjunctiva/sclera: Conjunctivae normal.      Pupils: Pupils are equal, round, and reactive to light.   Cardiovascular:      Rate and Rhythm: Normal rate and regular rhythm.      Pulses: Normal pulses.      Heart sounds: Normal heart sounds. No murmur heard.    No friction rub.   Pulmonary:      Effort: Pulmonary effort is normal. No respiratory distress.      Breath sounds: Normal breath sounds. No wheezing, rhonchi or rales.   Abdominal:      General: Bowel sounds are normal. There is no distension.      Tenderness: There is no abdominal tenderness. There is no guarding.   Musculoskeletal:      Cervical back: Neck supple. No tenderness.      Right lower leg: No edema.      Left lower leg: No edema.   Lymphadenopathy:      Cervical: No cervical adenopathy.   Skin:     General: Skin is warm and dry.      Capillary Refill: Capillary refill takes less than 2 seconds.   Neurological:      General: No focal deficit present.      Mental Status: He is alert and oriented to person, place, and time.   Psychiatric:         Mood and Affect: Mood normal.         Behavior: Behavior normal.         Thought Content: Thought content normal.         Judgment: Judgment normal.       Significant Labs: All pertinent labs within the past 24 hours have been reviewed.    Significant Imaging: I have reviewed all pertinent imaging results/findings within the past 24 hours.

## 2023-02-22 NOTE — CONSULTS
"Gastroenterology Consult Note    Chief Complaint: Upper GI bleed    Consulted by:   Dr. Palumbo    HPI:  Ruben Alarcon is a 77 y.o. WM with HTN, pAfib (Eliquis) that presents from home with hematemesis. Reports brushing his teeth late Tuesday and feeling nauseous - ultimately had an episode of hematemesis with clot per patient - filled part of the sink in his bathroom. Subsequently had multiple episodes of melena. Denies dysphagia, odynophagia, EtOH, NSAIDs, weight loss, abdominal pain. No prior EGD. Had colonoscopy many years ago. No H GI related cancer or cirrhosis.     Review of Systems   Constitutional:  Negative for weight loss.   Gastrointestinal:  Positive for melena, nausea and vomiting. Negative for abdominal pain, blood in stool, constipation, diarrhea and heartburn.   All other systems reviewed and are negative.    Past Medical History:   Diagnosis Date    Atrial fibrillation     Bradycardia     COVID-19 11/1/2021    Hyperlipidemia     Hypertension     Valvular heart disease     Type: TR    White coat syndrome with diagnosis of hypertension      Past Surgical History:   Procedure Laterality Date    ABLATION      Dr. Mazariegos at [facility]    SHOULDER SURGERY Left     TONSILLECTOMY       Family History   Problem Relation Age of Onset    Hypertension Mother     Hypertension Father     Cancer Sister     Leukemia Sister     Cancer Brother     Prostate cancer Brother     Cancer Brother     Bladder Cancer Brother        OBJECTIVE:  BP (!) 171/99   Pulse 88   Temp 99 °F (37.2 °C)   Resp 18   Ht 5' 6" (1.676 m)   Wt 68 kg (150 lb)   SpO2 96%   BMI 24.21 kg/m²   GEN: well appearing, cooperative, NAD  HEENT: NCAT, poor dentition, MMM  NECK: Supple, no LAD  CV: normal rate, regular rhythm  RESP: CTA bilaterally, unlabored  ABD: NABS, ND, NT, soft, no guarding  EXT: No clubbing, cyanosis, or edema.  SKIN: Warm and dry  NEURO: AAO x4. Afocal.    LABS:  Recent Results (from the past 336 hour(s))   CBC with " Differential    Collection Time: 02/21/23  6:45 PM   Result Value Ref Range    WBC 7.38 4.50 - 11.00 K/uL    Hemoglobin 9.7 (L) 13.5 - 18.0 g/dL    Hematocrit 28.3 (L) 40.0 - 54.0 %    Platelet Count 204 150 - 400 K/uL     BMP  Lab Results   Component Value Date     02/22/2023    K 3.6 02/22/2023     (H) 02/22/2023    CO2 27 02/22/2023    BUN 36 (H) 02/22/2023    CREATININE 1.21 02/22/2023    CALCIUM 8.1 (L) 02/22/2023    ANIONGAP 6 (L) 02/22/2023    EGFRNORACEVR 62 02/22/2023         IMAGING:  Imaging Results              X-Ray Abdomen Portable (Final result)  Result time 02/22/23 07:39:00      Final result by Bon Quintana MD (02/22/23 07:39:00)                   Impression:      Mild colonic stool      Electronically signed by: Bon Quintana  Date:    02/22/2023  Time:    07:39               Narrative:    EXAMINATION:  XR ABDOMEN PORTABLE    CLINICAL HISTORY:  N/V;    TECHNIQUE:  AP View(s) of the abdomen was performed.    COMPARISON:  None    FINDINGS:  No bowel obstruction.  Mild colonic stool.  Mild degenerative changes lower lumbar spine.                                    ASSESSMENT:    77 y.o. WM with HTN, pAfib s/p ablation (Eliquis) that presents from home with hematemesis.     PLAN:    Acute UGI Hemorrhage  - hematemesis, melena, BUN 36  - PUD vs Dieulafoy vs CA vs Esophagitis/Gastritis vs AVM  - agree with monitoring H&H, protonix, pRBC as needed  - NPO for EGD today      Thank you for the consult and including me in the care of this patient. Please call me with questions.     Mina Chavez MD  Gastroenterology

## 2023-02-22 NOTE — ED PROVIDER NOTES
Encounter Date: 2/21/2023    SCRIBE #1 NOTE: I, Susu Be, am scribing for, and in the presence of,  King Freeman MD. I have scribed the entire note.     History     Chief Complaint   Patient presents with    GI Problem     Pt states he vomited blood this morning - at at approx 1500 today had dark black stool     The patient is a 77 y.o. male who presents to the emergency department for hematemesis. The patient states that last night at around 21:30, he became dizzy and lightheaded. Upon waking up a little after midnight, the patient vomited chunks of food. The patient also had a bowel movement this morning that was black and malodorous.  The patient denies feeling pain anywhere or having had this problem before. He has a history of valvular heart disease, hypertension, and Afib. He takes Coreg and Eliquis. There are no other complaints in the ED at this time.    The history is provided by the patient. No  was used.   Review of patient's allergies indicates:  No Known Allergies  Past Medical History:   Diagnosis Date    Atrial fibrillation     Bradycardia     COVID-19 11/01/2021    Hiatal hernia with gastroesophageal reflux disease and esophagitis     Hyperlipidemia     Hypertension     Valvular heart disease     Type: TR    White coat syndrome with diagnosis of hypertension      Past Surgical History:   Procedure Laterality Date    ABLATION      Dr. Mazariegos at [facility]    SHOULDER SURGERY Left     TONSILLECTOMY       Family History   Problem Relation Age of Onset    Hypertension Mother     Hypertension Father     Cancer Sister     Leukemia Sister     Cancer Brother     Prostate cancer Brother     Cancer Brother     Bladder Cancer Brother      Social History     Tobacco Use    Smoking status: Never    Smokeless tobacco: Never   Substance Use Topics    Alcohol use: Never    Drug use: Never     Review of Systems   Constitutional: Negative.  Negative for fever.   Eyes: Negative.     Gastrointestinal:  Negative for abdominal pain.        Dark stools and hematemesis.   Endocrine: Negative.    Skin:  Positive for pallor.   Allergic/Immunologic: Negative.    Neurological:  Positive for dizziness and light-headedness.   All other systems reviewed and are negative.    Physical Exam     Initial Vitals [02/21/23 1806]   BP Pulse Resp Temp SpO2   (!) 154/93 97 18 97.9 °F (36.6 °C) 98 %      MAP       --         Physical Exam    Nursing note and vitals reviewed.  Constitutional: He appears well-developed and well-nourished.   HENT:   Head: Normocephalic and atraumatic.   Mouth/Throat: Oropharynx is clear and moist.   Eyes: Conjunctivae and EOM are normal. Pupils are equal, round, and reactive to light.   Neck: Neck supple.   Normal range of motion.  Cardiovascular:  Normal rate, regular rhythm, normal heart sounds and intact distal pulses.           Pulmonary/Chest: Breath sounds normal.   Abdominal: Abdomen is soft. Bowel sounds are normal.   Musculoskeletal:         General: Normal range of motion.      Cervical back: Normal range of motion and neck supple.     Neurological: He is alert and oriented to person, place, and time.   Skin: Skin is warm and dry. There is pallor.   Psychiatric: He has a normal mood and affect. His behavior is normal. Judgment and thought content normal.       ED Course   Procedures  Labs Reviewed   COMPREHENSIVE METABOLIC PANEL - Abnormal; Notable for the following components:       Result Value    Anion Gap 6 (*)     BUN 46 (*)     BUN/Creatinine Ratio 38 (*)     Total Protein 6.2 (*)     Albumin 3.2 (*)     Alk Phos 39 (*)     AST 11 (*)     All other components within normal limits   CBC WITH DIFFERENTIAL - Abnormal; Notable for the following components:    RBC 3.15 (*)     Hemoglobin 9.7 (*)     Hematocrit 28.3 (*)     Monocytes % 8.8 (*)     Eosinophils % 0.9 (*)     All other components within normal limits   POCT OCCULT BLOOD (STOOL) - Abnormal; Notable for the  following components:    Fecal Occult Blood Positive (*)     All other components within normal limits   APTT - Normal   PROTIME-INR - Normal   MAGNESIUM - Normal   CBC W/ AUTO DIFFERENTIAL    Narrative:     The following orders were created for panel order CBC auto differential.  Procedure                               Abnormality         Status                     ---------                               -----------         ------                     CBC with Differential[[id number]]        Abnormal            Final result                 Please view results for these tests on the individual orders.        ECG Results              EKG 12-lead (In process)  Result time 02/22/23 06:23:25      In process by Interface, Lab In [facility] (02/22/23 06:23:25)                   Narrative:    Test Reason : K92.2,    Vent. Rate : 080 BPM     Atrial Rate : 000 BPM     P-R Int : 160 ms          QRS Dur : 104 ms      QT Int : 394 ms       P-R-T Axes : 066 -23 024 degrees     QTc Int : 429 ms    Sinus rhythm  Leftward axis  Incomplete RBBB  Anterior T wave abnormality  is nonspecific  Borderline ECG      Referred By: [email or web address]   SELF           Confirmed By:                                   Imaging Results              X-Ray Abdomen Portable (Final result)  Result time 02/22/23 07:39:00      Final result by Bon Quintana MD (02/22/23 07:39:00)                   Impression:      Mild colonic stool      Electronically signed by: Bon Quintana  Date:    02/22/2023  Time:    07:39               Narrative:    EXAMINATION:  XR ABDOMEN PORTABLE    CLINICAL HISTORY:  N/V;    TECHNIQUE:  AP View(s) of the abdomen was performed.    COMPARISON:  None    FINDINGS:  No bowel obstruction.  Mild colonic stool.  Mild degenerative changes lower lumbar spine.                                       Medications   pantoprazole injection 80 mg (80 mg Intravenous Given 2/21/23 2020)                Attending Attestation:           Physician Attestation for  Scribe:  Physician Attestation Statement for Scribe #1: I, King Freeman MD, reviewed documentation, as scribed by Susu Be in my presence, and it is both accurate and complete.                        Clinical Impression:   Final diagnoses:  [K92.2] Upper GI bleed  [R07.9] Chest pain  [D62] Acute blood loss anemia [D62 (ICD-10-CM)]  [I48.0] Paroxysmal atrial fibrillation [I48.0 (ICD-10-CM)]  [I10] Primary hypertension [I10 (ICD-10-CM)]        ED Disposition Condition    Admit                 King Freeman MD  03/09/23 1929

## 2023-02-22 NOTE — ASSESSMENT & PLAN NOTE
- FOBT positive  - GI consult, appreciate assistance  - LR @ 125 cc/hr  - Protonix infusion  - NPO  - H/H Q6H  - type and screen  - cardiac monitoring   - Holding home Eliquis  - DUANE preliminary read unremarkable

## 2023-02-22 NOTE — PLAN OF CARE
[facility] - Short Stay Unit  Initial Discharge Assessment       Primary Care Provider: ULYSSES Gabriel    Admission Diagnosis: Upper GI bleed [K92.2]    Admission Date: 2/21/2023  Expected Discharge Date:     Discharge Barriers Identified: None    Payor: MEDICARE / Plan: MEDICARE PART A & B / Product Type: Government /     Extended Emergency Contact Information  Primary Emergency Contact: NARCISA OLVERA  Address: [address] [place] [address]  Mobile Phone: [phone]  Relation: Daughter  Preferred language: English   needed? No  Secondary Emergency Contact: LINDACLAUDIA  Mobile Phone: [phone]  Relation: Spouse   needed? No    Discharge Plan A: Home  Discharge Plan B: Home      [facility] - [address] [address]  Phone: [phone] Fax: [phone]    [address] [address]  Phone: [phone] Fax: [phone]    [id number] [place], [place] - [address] [facility]  [address] [facility] [address]  Phone: [phone] Fax: [phone]    [facility] [place], [place] - [address] [place] [address]  [address] [place] [address]  Phone: [phone] Fax: [phone]      Initial Assessment (most recent)       Adult Discharge Assessment - 02/22/23 1026          Discharge Assessment    Assessment Type Discharge Planning Assessment     Source of Information patient     Communicated ALEAH with patient/caregiver Date not available/Unable to determine     People in Home alone     Do you expect to return to your current living situation? Yes     Do you have help at home or someone to help you manage your care at home? Yes     Who are your caregiver(s) and their phone number(s)? Narcisa Olvera (daughter) [phone]     Prior to  hospitilization cognitive status: Alert/Oriented     Current cognitive status: Alert/Oriented     Home Accessibility stairs to enter home     Number of Stairs, Main Entrance three     Stair Railings, Main Entrance railings safe and in good condition     Home Layout Able to live on 1st floor     Equipment Currently Used at Home none     Readmission within 30 days? No     Patient currently being followed by outpatient case management? No     Do you currently have service(s) that help you manage your care at home? No     Do you take prescription medications? Yes     Do you have prescription coverage? Yes     Do you have any problems affording any of your prescribed medications? No     Is the patient taking medications as prescribed? yes     How do you get to doctors appointments? car, drives self     Are you on dialysis? No     Do you take coumadin? No     Discharge Plan A Home     Discharge Plan B Home     DME Needed Upon Discharge  none     Discharge Plan discussed with: Patient     Discharge Barriers Identified None        Physical Activity    On average, how many days per week do you engage in moderate to strenuous exercise (like a brisk walk)? 0 days     On average, how many minutes do you engage in exercise at this level? 0 min        Financial Resource Strain    How hard is it for you to pay for the very basics like food, housing, medical care, and heating? Not hard at all        Housing Stability    In the last 12 months, was there a time when you were not able to pay the mortgage or rent on time? No     In the last 12 months, how many places have you lived? 1     In the last 12 months, was there a time when you did not have a steady place to sleep or slept in a shelter (including now)? No        Transportation Needs    In the past 12 months, has lack of transportation kept you from medical appointments or from getting medications? No     In the past 12 months, has lack of transportation kept you from meetings,  work, or from getting things needed for daily living? No        Food Insecurity    Within the past 12 months, you worried that your food would run out before you got the money to buy more. Never true     Within the past 12 months, the food you bought just didn't last and you didn't have money to get more. Never true        Stress    Do you feel stress - tense, restless, nervous, or anxious, or unable to sleep at night because your mind is troubled all the time - these days? Not at all        Social Connections    In a typical week, how many times do you talk on the phone with family, friends, or neighbors? More than three times a week     How often do you get together with friends or relatives? More than three times a week     How often do you attend Baptism or Restoration services? More than 4 times per year     Do you belong to any clubs or organizations such as Baptism groups, unions, fraternal or athletic groups, or school groups? Yes     How often do you attend meetings of the clubs or organizations you belong to? More than 4 times per year     Are you , , , , never , or living with a partner?         Alcohol Use    Q1: How often do you have a drink containing alcohol? Never     Q2: How many drinks containing alcohol do you have on a typical day when you are drinking? Patient does not drink     Q3: How often do you have six or more drinks on one occasion? Never                   Patient lives at home alone. He has family for support. No DME or HH pta. Plans to return home at discharge. SDOH complete. SS following for discharge needs as arise.

## 2023-02-22 NOTE — NURSING
Rec'd pt alert, awake, and oriented.  VSS.  No complaints of pain or n/v.  All lines and tubes intact.  Positioned for comfort and safety.  Care ongoing.

## 2023-02-22 NOTE — PT/OT/SLP EVAL
Occupational Therapy Screen    OT consult received. Pt is hospitalized with GI bleed and anemia. Pt is asymptomatic and is independent in his room. Pt demonstrates good balance and movement and denies any need for OT services at this time. OT screen only. Please re-consult if pt experiences a decline in function.

## 2023-02-22 NOTE — ASSESSMENT & PLAN NOTE
Due to upper GI bleed, H/H on admission 9.7/28.9  - fluids as above  - type and screen as above  - transfuse if Hgb falls below 7

## 2023-02-22 NOTE — PT/OT/SLP PROGRESS
Physical Therapy      Patient Name:  Ruben Alarcon   MRN:  [id number]    Patient not seen today secondary to Therapist assessment. Screen only. Patient reports independent mobility with no signs of syncope and able to demonstrate same. Screen only. No skilled PT need. Pt will advise medical staff if he declines in functional mobility.

## 2023-02-22 NOTE — ASSESSMENT & PLAN NOTE
Patient has no documented episodes of Afib since ablation  - [id number] Score: 2   - HASBLED Score: 3  - holding home Eliquis due to bleed  - continuing home carvedilol 12.5mg BID  - EKG shows SR with RBBB

## 2023-02-22 NOTE — ASSESSMENT & PLAN NOTE
- FOBT positive  - GI consult  - LR @ 125 cc/hr  - Protonix infusion  - NPO  - H/H Q6H  - type and screen  - cardiac monitoring   - Holding home Eliquis  - DUANE preliminary read unremarkable

## 2023-02-22 NOTE — HPI
Patient is a 77 y.o.m. that presents to the ED for hematemesis and black stools associated with lightheadedness. Patient reports he awoke today around midnight with a feeling of a dirty mouth and therefore got out of bed to brush his teeth. Patient vomited while brushing his teeth twice. Patient described the vomit as a large amount of blood with clots. Patient reports some lightheadedness after vomiting. Patient also reports 3 episodes of black malodorous stool with one of the episodes containing a very small amount for bright red blood. Patient reports some slight bloating but denies continuous use of NSAIDs, nausea, diarrhea, or Abd pain. Patient is taking Eliquis for Afib but has not had a documented episode of Afib since ablation. Patient followed by Ruthy Davis (cardiology) and has been kept on Eliquis due to a CHADSVASC score of 3. Patient admitted to the hospital for further care and management.    ED Course: FOBT positive, CBC demonstrates a normocytic anemia, CMP demonstrated elevated BUM and BUM/Cr ratio, along with hypoproteinemia, hypoalbuminemia. Mag and Coags wnl and COVID test was negative.

## 2023-02-22 NOTE — SUBJECTIVE & OBJECTIVE
Past Medical History:   Diagnosis Date    Atrial fibrillation     Bradycardia     COVID-19 11/1/2021    Hyperlipidemia     Hypertension     Valvular heart disease     Type: TR    White coat syndrome with diagnosis of hypertension        Past Surgical History:   Procedure Laterality Date    ABLATION      Dr. Mazariegos at [facility]    SHOULDER SURGERY Left     TONSILLECTOMY         Review of patient's allergies indicates:  No Known Allergies    No current facility-administered medications on file prior to encounter.     Current Outpatient Medications on File Prior to Encounter   Medication Sig    amLODIPine (NORVASC) 10 MG tablet Take 1 tablet (10 mg total) by mouth once daily.    apixaban (ELIQUIS) 5 mg Tab Take 1 tablet (5 mg total) by mouth 2 (two) times daily.    carvediloL (COREG) 12.5 MG tablet Take 1 tablet (12.5 mg total) by mouth 2 (two) times daily.    omega-3 fatty acids/fish oil (FISH OIL-OMEGA-3 FATTY ACIDS) 300-1,000 mg capsule Take 1 capsule by mouth once daily.     Family History       Problem Relation (Age of Onset)    Bladder Cancer Brother    Cancer Sister, Brother, Brother    Hypertension Mother, Father    Leukemia Sister    Prostate cancer Brother          Tobacco Use    Smoking status: Never    Smokeless tobacco: Never   Substance and Sexual Activity    Alcohol use: Never    Drug use: Never    Sexual activity: Yes     Partners: Female     Review of Systems   Constitutional:  Negative for activity change, appetite change, chills, diaphoresis and fever.   HENT:  Negative for congestion, nosebleeds, postnasal drip, rhinorrhea, sore throat and trouble swallowing.    Eyes:  Negative for visual disturbance.   Respiratory:  Negative for cough, chest tightness and shortness of breath.    Cardiovascular:  Negative for chest pain and leg swelling.   Gastrointestinal:  Positive for abdominal distention, blood in stool and vomiting. Negative for abdominal pain, constipation, diarrhea and nausea.   Genitourinary:   Negative for dysuria and hematuria.   Musculoskeletal:  Negative for joint swelling.   Skin:  Negative for rash.   Neurological:  Positive for dizziness and light-headedness. Negative for weakness and headaches.   Psychiatric/Behavioral:  Negative for confusion.    All other systems reviewed and are negative.  Objective:     Vital Signs (Most Recent):  Temp: 98.4 °F (36.9 °C) (02/21/23 2149)  Pulse: 85 (02/21/23 2149)  Resp: 20 (02/21/23 2149)  BP: 128/80 (02/21/23 2149)  SpO2: 98 % (02/21/23 2149) Vital Signs (24h Range):  Temp:  [97.9 °F (36.6 °C)-98.4 °F (36.9 °C)] 98.4 °F (36.9 °C)  Pulse:  [80-97] 85  Resp:  [14-21] 20  SpO2:  [95 %-99 %] 98 %  BP: (128-154)/(78-93) 128/80     Weight: 68 kg (150 lb)  Body mass index is 24.21 kg/m².    Physical Exam  Vitals and nursing note reviewed.   Constitutional:       General: He is not in acute distress.     Appearance: Normal appearance. He is not ill-appearing, toxic-appearing or diaphoretic.   HENT:      Head: Normocephalic and atraumatic.      Nose: Nose normal. No congestion or rhinorrhea.      Mouth/Throat:      Mouth: Mucous membranes are moist.      Pharynx: Oropharynx is clear. No oropharyngeal exudate or posterior oropharyngeal erythema.   Eyes:      Conjunctiva/sclera: Conjunctivae normal.      Pupils: Pupils are equal, round, and reactive to light.   Cardiovascular:      Rate and Rhythm: Normal rate and regular rhythm.      Pulses: Normal pulses.      Heart sounds: Normal heart sounds. No murmur heard.    No friction rub.   Pulmonary:      Effort: Pulmonary effort is normal. No respiratory distress.      Breath sounds: Normal breath sounds. No wheezing, rhonchi or rales.   Abdominal:      General: Bowel sounds are normal. There is no distension.      Tenderness: There is no abdominal tenderness. There is no guarding.   Musculoskeletal:      Cervical back: Neck supple. No tenderness.      Right lower leg: No edema.      Left lower leg: No edema.    Lymphadenopathy:      Cervical: No cervical adenopathy.   Skin:     General: Skin is warm and dry.      Capillary Refill: Capillary refill takes less than 2 seconds.   Neurological:      General: No focal deficit present.      Mental Status: He is alert and oriented to person, place, and time.   Psychiatric:         Mood and Affect: Mood normal.         Behavior: Behavior normal.         Thought Content: Thought content normal.         Judgment: Judgment normal.         CRANIAL NERVES     CN III, IV, VI   Pupils are equal, round, and reactive to light.     Significant Labs: All pertinent labs within the past 24 hours have been reviewed.    Significant Imaging: I have reviewed all pertinent imaging results/findings within the past 24 hours.

## 2023-02-23 VITALS
BODY MASS INDEX: 24.11 KG/M2 | WEIGHT: 150 LBS | HEART RATE: 72 BPM | SYSTOLIC BLOOD PRESSURE: 130 MMHG | HEIGHT: 66 IN | DIASTOLIC BLOOD PRESSURE: 79 MMHG | TEMPERATURE: 98 F | OXYGEN SATURATION: 98 % | RESPIRATION RATE: 18 BRPM

## 2023-02-23 LAB
ANION GAP SERPL CALCULATED.3IONS-SCNC: 14 MMOL/L (ref 7–16)
BUN SERPL-MCNC: 19 MG/DL (ref 7–18)
BUN/CREAT SERPL: 20 (ref 6–20)
CALCIUM SERPL-MCNC: 7.9 MG/DL (ref 8.5–10.1)
CHLORIDE SERPL-SCNC: 108 MMOL/L (ref 98–107)
CO2 SERPL-SCNC: 25 MMOL/L (ref 21–32)
CREAT SERPL-MCNC: 0.93 MG/DL (ref 0.7–1.3)
DHEA SERPL-MCNC: NORMAL
EGFR (NO RACE VARIABLE) (RUSH/TITUS): 85 ML/MIN/1.73M²
ESTROGEN SERPL-MCNC: NORMAL PG/ML
GLUCOSE SERPL-MCNC: 101 MG/DL (ref 74–106)
HCT VFR BLD AUTO: 22.9 % (ref 40–54)
HCT VFR BLD AUTO: 25.1 % (ref 40–54)
HGB BLD-MCNC: 7.7 G/DL (ref 13.5–18)
HGB BLD-MCNC: 8.5 G/DL (ref 13.5–18)
INSULIN SERPL-ACNC: NORMAL U[IU]/ML
LAB AP GROSS DESCRIPTION: NORMAL
LAB AP LABORATORY NOTES: NORMAL
POTASSIUM SERPL-SCNC: 4.5 MMOL/L (ref 3.5–5.1)
SODIUM SERPL-SCNC: 142 MMOL/L (ref 136–145)
T3RU NFR SERPL: NORMAL %

## 2023-02-23 PROCEDURE — 99239 PR HOSPITAL DISCHARGE DAY,>30 MIN: ICD-10-PCS | Mod: ,,, | Performed by: INTERNAL MEDICINE

## 2023-02-23 PROCEDURE — 85014 HEMATOCRIT: CPT

## 2023-02-23 PROCEDURE — 25000003 PHARM REV CODE 250

## 2023-02-23 PROCEDURE — 99239 HOSP IP/OBS DSCHRG MGMT >30: CPT | Mod: ,,, | Performed by: INTERNAL MEDICINE

## 2023-02-23 PROCEDURE — C9113 INJ PANTOPRAZOLE SODIUM, VIA: HCPCS

## 2023-02-23 PROCEDURE — 63600175 PHARM REV CODE 636 W HCPCS

## 2023-02-23 PROCEDURE — 80048 BASIC METABOLIC PNL TOTAL CA: CPT

## 2023-02-23 RX ORDER — PANTOPRAZOLE SODIUM 40 MG/1
40 TABLET, DELAYED RELEASE ORAL 2 TIMES DAILY
Qty: 60 TABLET | Refills: 2 | Status: SHIPPED | OUTPATIENT
Start: 2023-02-23 | End: 2023-05-09 | Stop reason: SDUPTHER

## 2023-02-23 RX ADMIN — PANTOPRAZOLE SODIUM 8 MG/HR: 40 INJECTION, POWDER, FOR SOLUTION INTRAVENOUS at 03:02

## 2023-02-23 NOTE — HOSPITAL COURSE
Patient is a 76yo  male with a PMH of Afib RVR s/p ablation on Eliquis, bradycardia, HLD, HTN, tricuspid regurgitation, white coat syndrome who presented to the ED with hematemesis, dark black stools, and lightheadedness. Patient vomited dark blood 2x and had dark black stools 3x before presenting to the ED. Patient's FOBT was positive and Hgb 9.7 with normocytic anemia. EKG NSR with RBBB. XR abdomen revealed mild colonic stool and negative obstruction or acute findings. Patient's Eliquis was held and he was given IVF, PPI infusion. Hemoglobin was monitored and home Coreg and Amlodipine were held due to low BP. GI was consulted and EGD revealed grade C reflux esophagitis, 4-cm hiatal hernia. EGD negative for bleeding, varices, PUD, mass, AVM, or puma lesion. Patient likely bled from the esophagitis while on Eliquis. GI cleared for discharge and patient finished 3 day of PPI infusion and was recommended to start on Protonix BID for 3 months then daily indefinitely. Patient will have repeat EGD in 3 months to ensure healing and to rule out mcgowan's esophagus. Patient's home medications were restarted except Norvasc 10mg QD that was discontinued due to soft to normal BP. Patient has maximized benefits from this hospitalization and is stable at discharge. Patient will followup with PCP in 1 week and GI in 3 months for EGD.

## 2023-02-23 NOTE — ASSESSMENT & PLAN NOTE
Due to upper GI bleed, H/H on admission 9.7/28.9  - fluids as above  - type and screen as above  - transfuse if Hgb falls below 7    Stable at DC.

## 2023-02-23 NOTE — ASSESSMENT & PLAN NOTE
Patient has no documented episodes of Afib since ablation  - [id number] Score: 2   - HASBLED Score: 3  - holding home Eliquis due to bleed  - continuing home carvedilol 12.5mg BID  - EKG shows SR with RBBB    Restart Eliquis 1 day after discharge.

## 2023-02-23 NOTE — PLAN OF CARE
[facility] - Short Stay Unit  Discharge Final Note    Primary Care Provider: Primary Doctor No    Expected Discharge Date: 2/23/2023    Final Discharge Note (most recent)       Final Note - 02/23/23 1149          Final Note    Assessment Type Final Discharge Note     Anticipated Discharge Disposition Home or Self Care        Post-Acute Status    Discharge Delays None known at this time                   Patient discharging home today. No discharge needs noted at this time.     Important Message from Medicare  Important Message from Medicare regarding Discharge Appeal Rights: Given to patient/caregiver, Explained to patient/caregiver, Signed/date by patient/caregiver     Date IMM was signed: 02/23/23  Time IMM was signed: 1149    Contact Info       ULYSSES Gabriel   Specialty: Family Medicine    [address] [place] [address]   [facility] [place] [address]   Phone: [phone]       Next Steps: Go on 3/2/2023    Instructions: Follow up in 1 week(s) March 2,2023 at 11:20 am  Appointment:   Instructions: esophagitis and hospitalization f/u, anemia f/u. Holding Norvasc 10 QD because normal BP during admission. Consider retart if HTN at f/u.

## 2023-02-23 NOTE — ANESTHESIA POSTPROCEDURE EVALUATION
Anesthesia Post Evaluation    Patient: Ruben Alarcon    Procedure(s) Performed: * EGD *    Final Anesthesia Type: general      Patient location during evaluation: GI PACU  Patient participation: Yes- Able to Participate  Level of consciousness: awake and alert  Post-procedure vital signs: reviewed and stable  Pain management: adequate  Airway patency: patent    PONV status at discharge: No PONV  Anesthetic complications: no      Cardiovascular status: blood pressure returned to baseline and hemodynamically stable  Respiratory status: spontaneous ventilation  Hydration status: euvolemic  Follow-up not needed.  Comments: Pt voices appreciation for care          Vitals Value Taken Time   /51 02/23/23 0400   Temp 36.7 °C (98.1 °F) 02/23/23 0400   Pulse 79 02/23/23 0400   Resp 20 02/23/23 0400   SpO2 98 % 02/23/23 0400         Event Time   Out of Recovery 02/22/2023 15:36:40         Pain/Spencer Score: Spencer Score: 10 (2/22/2023  3:02 PM)

## 2023-02-23 NOTE — DISCHARGE SUMMARY
[facility] - Short Stay [facility]  Discharge Summary      Patient Name: Ruben Alarcon  MRN: [id number]  ANA MARIA: [id number]  Patient Class: IP- Inpatient  Admission Date: 2/21/2023  Hospital Length of Stay: 2 days  Discharge Date and Time:  02/23/2023 5:25 PM  Attending Physician: Racheal att. providers found   Discharging Provider: Liza Andre DO  Primary Care Provider: Primary Doctor Racheal    Primary Care Team: Networked reference to record PCT     HPI:   Patient is a 77 y.o.m. that presents to the ED for hematemesis and black stools associated with lightheadedness. Patient reports he awoke today around midnight with a feeling of a dirty mouth and therefore got out of bed to brush his teeth. Patient vomited while brushing his teeth twice. Patient described the vomit as a large amount of blood with clots. Patient reports some lightheadedness after vomiting. Patient also reports 3 episodes of black malodorous stool with one of the episodes containing a very small amount for bright red blood. Patient reports some slight bloating but denies continuous use of NSAIDs, nausea, diarrhea, or Abd pain. Patient is taking Eliquis for Afib but has not had a documented episode of Afib since ablation. Patient followed by Ruthy Davis (cardiology) and has been kept on Eliquis due to a CHADSVASC score of 3. Patient admitted to the hospital for further care and management.    ED Course: FOBT positive, CBC demonstrates a normocytic anemia, CMP demonstrated elevated BUM and BUM/Cr ratio, along with hypoproteinemia, hypoalbuminemia. Mag and Coags wnl and COVID test was negative.        * No surgery found *      Hospital Course:   Patient is a 78yo  male with a PMH of Afib RVR s/p ablation on Eliquis, bradycardia, HLD, HTN, tricuspid regurgitation, white coat syndrome who presented to the ED with hematemesis, dark black stools, and lightheadedness. Patient vomited dark blood 2x and had dark black stools 3x  before presenting to the ED. Patient's FOBT was positive and Hgb 9.7 with normocytic anemia. EKG NSR with RBBB. XR abdomen revealed mild colonic stool and negative obstruction or acute findings. Patient's Eliquis was held and he was given IVF, PPI infusion. Hemoglobin was monitored and home Coreg and Amlodipine were held due to low BP. GI was consulted and EGD revealed grade C reflux esophagitis, 4-cm hiatal hernia. EGD negative for bleeding, varices, PUD, mass, AVM, or puma lesion. Patient likely bled from the esophagitis while on Eliquis. GI cleared for discharge and patient finished 3 day of PPI infusion and was recommended to start on Protonix BID for 3 months then daily indefinitely. Patient will have repeat EGD in 3 months to ensure healing and to rule out mcgowan's esophagus. Patient's home medications were restarted except Norvasc 10mg QD that was discontinued due to soft to normal BP. Patient has maximized benefits from this hospitalization and is stable at discharge. Patient will followup with PCP in 1 week and GI in 3 months for EGD.         Goals of Care Treatment Preferences:  Code Status: Full Code      Consults:   Consults (From admission, onward)        Status Ordering Provider     Inpatient consult to Gastroenterology  Once        Provider:  Mina Chavez MD    Completed JOSE, REHMAT U          Cardiac/Vascular  Hypertension  holding home carvedilol and amlodipine due to /67   Restart as needed    Stopped Norvasc  Restart Carvediol and restart Norvasc outpatient with PCP as needed.    Atrial fibrillation  Patient has no documented episodes of Afib since ablation  - [id number] Score: 2   - HASBLED Score: 3  - holding home Eliquis due to bleed  - continuing home carvedilol 12.5mg BID  - EKG shows SR with RBBB    Restart Eliquis 1 day after discharge.      Oncology  Acute blood loss anemia  Due to upper GI bleed, H/H on admission 9.7/28.9  - fluids as above  - type and screen as above  -  transfuse if Hgb falls below 7    Stable at DC.      GI  * Upper GI bleed  - FOBT positive  - GI consult, appreciate assistance  - LR @ 125 cc/hr  - Protonix infusion  - NPO  - H/H Q6H  - type and screen  - cardiac monitoring   - Holding home Eliquis  - KUB mild colonic stool without obstruction.     Grade C reflux esophagitis with ulceration   - Small nodule associated with esophagitis, biopsied  - 4-cm hiatal hernia  - The exam was otherwise normal  - No blood/bleeding, varices, pmua lesion, PUD, mass, or AVM     Resolved.  PPI BID for 3 months then daily indefinitely   Schedule repeat EGD in 3 months to ensure healing and rule out mcgowan's esophagus               Final Active Diagnoses:    Diagnosis Date Noted POA    PRINCIPAL PROBLEM:  Upper GI bleed [K92.2] 02/21/2023 Yes     Chronic    Acute blood loss anemia [D62] 02/21/2023 Yes    Atrial fibrillation [I48.91]  Yes    Hypertension [I10]  Yes      Problems Resolved During this Admission:       Discharged Condition: stable    Disposition: Home or Self Care    Follow Up:   Follow-up Information     ULYSSES Gabriel. Go on 3/2/2023.    Specialty: Family Medicine  Why: Follow up in 1 week(s) March 2,2023 at 11:20 am  Appointment:   Instructions: esophagitis and hospitalization f/u, anemia f/u. Holding Norvasc 10 QD because normal BP during admission. Consider retart if HTN at f/u.  Contact information:  [address]  [place] [facility]  [place] [address] [phone]                       Patient Instructions:      Diet Cardiac     Activity as tolerated     EGD   Standing Status: Future Standing Exp. Date: 02/22/24     Order Specific Question Answer Comments   Location for procedure [facility]    Where to place procedure? ENDO    What is the patient's sedation requirement? Anesthesia    CPT Code: TX ESOPHAGOSCOPY,DIAGNOSTIC [80943]    CPT Code: TX EGD, FLEX, W/BIOPSY, SGL/MULTI [80781]    CPT Code: TX EGD, FLEX, W/DILATION OVER GUIDEWIRE [69997]     CPT Code: MD DILATE ESOPHAGUS [46777]    CPT Code: MD EGD, FLEX, DIAGNOSTIC [24847]    CPT Code: MD UPPER GI ENDOSCOPY,DRAIN PSEUDOCYST [53468]        Significant Diagnostic Studies: Labs: All labs within the past 24 hours have been reviewed    Pending Diagnostic Studies:     Procedure Component Value Units Date/Time    EKG 12-lead [[id number]] Collected: 02/21/23 2036    Order Status: Sent Lab Status: In process Updated: 02/22/23 0623    Narrative:      Test Reason : K92.2,    Vent. Rate : 080 BPM     Atrial Rate : 000 BPM     P-R Int : 160 ms          QRS Dur : 104 ms      QT Int : 394 ms       P-R-T Axes : 066 -23 024 degrees     QTc Int : 429 ms    Sinus rhythm  Leftward axis  Incomplete RBBB  Anterior T wave abnormality  is nonspecific  Borderline ECG      Referred By: SHILPA   SELF           Confirmed By:          Medications:  Reconciled Home Medications:      Medication List      START taking these medications    pantoprazole 40 MG tablet  Commonly known as: PROTONIX  Take 1 tablet (40 mg total) by mouth 2 (two) times daily.        CONTINUE taking these medications    apixaban 5 mg Tab  Commonly known as: ELIQUIS  Take 1 tablet (5 mg total) by mouth 2 (two) times daily.     carvediloL 12.5 MG tablet  Commonly known as: COREG  Take 1 tablet (12.5 mg total) by mouth 2 (two) times daily.     fish oil-omega-3 fatty acids 300-1,000 mg capsule  Take 1 capsule by mouth once daily.        STOP taking these medications    amLODIPine 10 MG tablet  Commonly known as: NORVASC            Indwelling Lines/Drains at time of discharge:   Lines/Drains/Airways     None                 Time spent on the discharge of patient: 35 minutes         Liza Andre DO  Department of Hospital Medicine  [facility] Short Stay [facility]

## 2023-02-23 NOTE — ASSESSMENT & PLAN NOTE
- FOBT positive  - GI consult, appreciate assistance  - LR @ 125 cc/hr  - Protonix infusion  - NPO  - H/H Q6H  - type and screen  - cardiac monitoring   - Holding home Eliquis  - KUB mild colonic stool without obstruction.     Grade C reflux esophagitis with ulceration   - Small nodule associated with esophagitis, biopsied  - 4-cm hiatal hernia  - The exam was otherwise normal  - No blood/bleeding, varices, puma lesion, PUD, mass, or AVM     Resolved.  PPI BID for 3 months then daily indefinitely   Schedule repeat EGD in 3 months to ensure healing and rule out mcgowan's esophagus

## 2023-02-23 NOTE — ASSESSMENT & PLAN NOTE
holding home carvedilol and amlodipine due to /67   Restart as needed    Stopped Norvasc  Restart Carvediol and restart Norvasc outpatient with PCP as needed.

## 2023-03-02 ENCOUNTER — OFFICE VISIT (OUTPATIENT)
Dept: FAMILY MEDICINE | Facility: CLINIC | Age: 78
End: 2023-03-02
Payer: MEDICARE

## 2023-03-02 VITALS
HEIGHT: 66 IN | RESPIRATION RATE: 20 BRPM | OXYGEN SATURATION: 98 % | SYSTOLIC BLOOD PRESSURE: 154 MMHG | BODY MASS INDEX: 23.43 KG/M2 | WEIGHT: 145.81 LBS | TEMPERATURE: 98 F | DIASTOLIC BLOOD PRESSURE: 98 MMHG | HEART RATE: 64 BPM

## 2023-03-02 DIAGNOSIS — D62 ACUTE BLOOD LOSS ANEMIA: ICD-10-CM

## 2023-03-02 DIAGNOSIS — I10 PRIMARY HYPERTENSION: Chronic | ICD-10-CM

## 2023-03-02 DIAGNOSIS — K92.2 UPPER GI BLEED: Primary | ICD-10-CM

## 2023-03-02 DIAGNOSIS — Z11.59 NEED FOR HEPATITIS C SCREENING TEST: ICD-10-CM

## 2023-03-02 DIAGNOSIS — Z12.5 PROSTATE CANCER SCREENING: ICD-10-CM

## 2023-03-02 DIAGNOSIS — Z23 NEED FOR PNEUMOCOCCAL VACCINE: ICD-10-CM

## 2023-03-02 DIAGNOSIS — I48.0 PAROXYSMAL ATRIAL FIBRILLATION: Chronic | ICD-10-CM

## 2023-03-02 DIAGNOSIS — Z79.899 ENCOUNTER FOR LONG-TERM (CURRENT) USE OF OTHER MEDICATIONS: ICD-10-CM

## 2023-03-02 PROBLEM — S80.11XA HEMATOMA OF RIGHT LOWER LEG: Status: RESOLVED | Noted: 2022-03-29 | Resolved: 2023-03-02

## 2023-03-02 PROBLEM — M79.89 PAIN AND SWELLING OF RIGHT LOWER LEG: Status: RESOLVED | Noted: 2022-03-29 | Resolved: 2023-03-02

## 2023-03-02 PROBLEM — M79.661 PAIN AND SWELLING OF RIGHT LOWER LEG: Status: RESOLVED | Noted: 2022-03-29 | Resolved: 2023-03-02

## 2023-03-02 LAB
ALBUMIN SERPL BCP-MCNC: 3.6 G/DL (ref 3.5–5)
ALBUMIN/GLOB SERPL: 1.2 {RATIO}
ALP SERPL-CCNC: 50 U/L (ref 45–115)
ALT SERPL W P-5'-P-CCNC: 16 U/L (ref 16–61)
ANION GAP SERPL CALCULATED.3IONS-SCNC: 9 MMOL/L (ref 7–16)
AST SERPL W P-5'-P-CCNC: 17 U/L (ref 15–37)
BASOPHILS # BLD AUTO: 0.04 K/UL (ref 0–0.2)
BASOPHILS NFR BLD AUTO: 0.7 % (ref 0–1)
BILIRUB SERPL-MCNC: 0.3 MG/DL (ref ?–1.2)
BUN SERPL-MCNC: 15 MG/DL (ref 7–18)
BUN/CREAT SERPL: 16 (ref 6–20)
CALCIUM SERPL-MCNC: 8.5 MG/DL (ref 8.5–10.1)
CHLORIDE SERPL-SCNC: 109 MMOL/L (ref 98–107)
CHOLEST SERPL-MCNC: 182 MG/DL (ref 0–200)
CHOLEST/HDLC SERPL: 3.9 {RATIO}
CO2 SERPL-SCNC: 25 MMOL/L (ref 21–32)
CREAT SERPL-MCNC: 0.96 MG/DL (ref 0.7–1.3)
DIFFERENTIAL METHOD BLD: ABNORMAL
EGFR (NO RACE VARIABLE) (RUSH/TITUS): 81 ML/MIN/1.73M²
EOSINOPHIL # BLD AUTO: 0.2 K/UL (ref 0–0.5)
EOSINOPHIL NFR BLD AUTO: 3.6 % (ref 1–4)
ERYTHROCYTE [DISTWIDTH] IN BLOOD BY AUTOMATED COUNT: 14.5 % (ref 11.5–14.5)
FERRITIN SERPL-MCNC: 26 NG/ML (ref 26–388)
GLOBULIN SER-MCNC: 3 G/DL (ref 2–4)
GLUCOSE SERPL-MCNC: 94 MG/DL (ref 74–106)
HCT VFR BLD AUTO: 29.2 % (ref 40–54)
HCV AB SER QL: NORMAL
HDLC SERPL-MCNC: 47 MG/DL (ref 40–60)
HGB BLD-MCNC: 9.6 G/DL (ref 13.5–18)
IMM GRANULOCYTES # BLD AUTO: 0.01 K/UL (ref 0–0.04)
IMM GRANULOCYTES NFR BLD: 0.2 % (ref 0–0.4)
IRON SATN MFR SERPL: 13 % (ref 14–50)
IRON SERPL-MCNC: 39 ΜG/DL (ref 65–175)
LDLC SERPL CALC-MCNC: 119 MG/DL
LDLC/HDLC SERPL: 2.5 {RATIO}
LYMPHOCYTES # BLD AUTO: 1.82 K/UL (ref 1–4.8)
LYMPHOCYTES NFR BLD AUTO: 32.3 % (ref 27–41)
MCH RBC QN AUTO: 30.2 PG (ref 27–31)
MCHC RBC AUTO-ENTMCNC: 32.9 G/DL (ref 32–36)
MCV RBC AUTO: 91.8 FL (ref 80–96)
MONOCYTES # BLD AUTO: 0.55 K/UL (ref 0–0.8)
MONOCYTES NFR BLD AUTO: 9.8 % (ref 2–6)
MPC BLD CALC-MCNC: 10.5 FL (ref 9.4–12.4)
NEUTROPHILS # BLD AUTO: 3.01 K/UL (ref 1.8–7.7)
NEUTROPHILS NFR BLD AUTO: 53.4 % (ref 53–65)
NONHDLC SERPL-MCNC: 135 MG/DL
NRBC # BLD AUTO: 0 X10E3/UL
NRBC, AUTO (.00): 0 %
PLATELET # BLD AUTO: 284 K/UL (ref 150–400)
POTASSIUM SERPL-SCNC: 4.5 MMOL/L (ref 3.5–5.1)
PROT SERPL-MCNC: 6.6 G/DL (ref 6.4–8.2)
PSA SERPL-MCNC: 10.4 NG/ML
RBC # BLD AUTO: 3.18 M/UL (ref 4.6–6.2)
SODIUM SERPL-SCNC: 138 MMOL/L (ref 136–145)
TIBC SERPL-MCNC: 311 ΜG/DL (ref 250–450)
TRIGL SERPL-MCNC: 82 MG/DL (ref 35–150)
TSH SERPL DL<=0.005 MIU/L-ACNC: 2.39 UIU/ML (ref 0.36–3.74)
VLDLC SERPL-MCNC: 16 MG/DL
WBC # BLD AUTO: 5.63 K/UL (ref 4.5–11)

## 2023-03-02 PROCEDURE — 83550 IRON AND TIBC: ICD-10-PCS | Mod: ,,, | Performed by: CLINICAL MEDICAL LABORATORY

## 2023-03-02 PROCEDURE — 85025 CBC WITH DIFFERENTIAL: ICD-10-PCS | Mod: ,,, | Performed by: CLINICAL MEDICAL LABORATORY

## 2023-03-02 PROCEDURE — 90677 PNEUMOCOCCAL CONJUGATE VACCINE 20-VALENT: ICD-10-PCS | Mod: ,,, | Performed by: NURSE PRACTITIONER

## 2023-03-02 PROCEDURE — 84443 ASSAY THYROID STIM HORMONE: CPT | Mod: ,,, | Performed by: CLINICAL MEDICAL LABORATORY

## 2023-03-02 PROCEDURE — 83550 IRON BINDING TEST: CPT | Mod: ,,, | Performed by: CLINICAL MEDICAL LABORATORY

## 2023-03-02 PROCEDURE — 83540 IRON AND TIBC: ICD-10-PCS | Mod: ,,, | Performed by: CLINICAL MEDICAL LABORATORY

## 2023-03-02 PROCEDURE — G0009 ADMIN PNEUMOCOCCAL VACCINE: HCPCS | Mod: ,,, | Performed by: NURSE PRACTITIONER

## 2023-03-02 PROCEDURE — 80053 COMPREHEN METABOLIC PANEL: CPT | Mod: ,,, | Performed by: CLINICAL MEDICAL LABORATORY

## 2023-03-02 PROCEDURE — G0009 PNEUMOCOCCAL CONJUGATE VACCINE 20-VALENT: ICD-10-PCS | Mod: ,,, | Performed by: NURSE PRACTITIONER

## 2023-03-02 PROCEDURE — 90677 PCV20 VACCINE IM: CPT | Mod: ,,, | Performed by: NURSE PRACTITIONER

## 2023-03-02 PROCEDURE — 80053 COMPREHENSIVE METABOLIC PANEL: ICD-10-PCS | Mod: ,,, | Performed by: CLINICAL MEDICAL LABORATORY

## 2023-03-02 PROCEDURE — 86803 HEPATITIS C AB TEST: CPT | Mod: ,,, | Performed by: CLINICAL MEDICAL LABORATORY

## 2023-03-02 PROCEDURE — 85025 COMPLETE CBC W/AUTO DIFF WBC: CPT | Mod: ,,, | Performed by: CLINICAL MEDICAL LABORATORY

## 2023-03-02 PROCEDURE — G0103 PSA SCREENING: HCPCS | Mod: ,,, | Performed by: CLINICAL MEDICAL LABORATORY

## 2023-03-02 PROCEDURE — 80061 LIPID PANEL: CPT | Mod: ,,, | Performed by: CLINICAL MEDICAL LABORATORY

## 2023-03-02 PROCEDURE — G0103 PSA, SCREENING: ICD-10-PCS | Mod: ,,, | Performed by: CLINICAL MEDICAL LABORATORY

## 2023-03-02 PROCEDURE — 84443 TSH: ICD-10-PCS | Mod: ,,, | Performed by: CLINICAL MEDICAL LABORATORY

## 2023-03-02 PROCEDURE — 99214 PR OFFICE/OUTPT VISIT, EST, LEVL IV, 30-39 MIN: ICD-10-PCS | Mod: ,,, | Performed by: NURSE PRACTITIONER

## 2023-03-02 PROCEDURE — 80061 LIPID PANEL: ICD-10-PCS | Mod: ,,, | Performed by: CLINICAL MEDICAL LABORATORY

## 2023-03-02 PROCEDURE — 83540 ASSAY OF IRON: CPT | Mod: ,,, | Performed by: CLINICAL MEDICAL LABORATORY

## 2023-03-02 PROCEDURE — 86803 HEPATITIS C ANTIBODY: ICD-10-PCS | Mod: ,,, | Performed by: CLINICAL MEDICAL LABORATORY

## 2023-03-02 PROCEDURE — 99214 OFFICE O/P EST MOD 30 MIN: CPT | Mod: ,,, | Performed by: NURSE PRACTITIONER

## 2023-03-02 PROCEDURE — 82728 FERRITIN: ICD-10-PCS | Mod: ,,, | Performed by: CLINICAL MEDICAL LABORATORY

## 2023-03-02 PROCEDURE — 82728 ASSAY OF FERRITIN: CPT | Mod: ,,, | Performed by: CLINICAL MEDICAL LABORATORY

## 2023-03-02 NOTE — PROGRESS NOTES
[facility]       PATIENT NAME: Ruben Alarcon   : 1945    AGE: 77 y.o. DATE OF ENCOUNTER: 3/2/23    MRN: [id number]      PCP: ULYSSES Gabriel    Reason for Visit / Chief Complaint:  Follow-up (Patient presents to clinic for hospital f/u. Needs AWV aapointment slip.)         274}    Subjective:     HPI:    Presents for hospital discharge f/u.  He was admitted to [facility] 23 for UGI, esophagitis, and acute blood loss anemia and dc home 23.  Norvasc was held due to normal BP.  He is followed by Cardiology for history AFib with history of ablation on long-term Eliquis.  On PPI b.i.d. with plan for 3-mth f/u EGD to ensure healing and rule out Pearce's esophagus.    Hospital and discharge medications have been reconciled.    Home BP yesterday  and today 148.    Note from daughter requesting sooner cardiology appt to discuss Eliquis vs watchman procedure vs Lynq monitor.  Not taking iron.    Review of Systems:   Review of Systems   Constitutional:  Negative for activity change and unexpected weight change.   HENT:  Negative for hearing loss, rhinorrhea and trouble swallowing.    Eyes:  Negative for discharge and visual disturbance.   Respiratory:  Negative for chest tightness, shortness of breath and wheezing.    Cardiovascular:  Negative for chest pain and palpitations.   Gastrointestinal:  Positive for blood in stool (now resolved) and vomiting (now resolved). Negative for abdominal pain, anal bleeding, constipation, diarrhea and nausea.   Endocrine: Negative for polydipsia and polyuria.   Genitourinary:  Negative for difficulty urinating, hematuria and urgency.   Musculoskeletal:  Negative for arthralgias, joint swelling and neck pain.   Neurological:  Negative for weakness and headaches.   Psychiatric/Behavioral:  Negative for confusion and dysphoric mood.      Allergies and Meds: 274}   Review of patient's allergies indicates:  No  "Known Allergies     Current Outpatient Medications:     apixaban (ELIQUIS) 5 mg Tab, Take 1 tablet (5 mg total) by mouth 2 (two) times daily., Disp: 180 tablet, Rfl: 3    carvediloL (COREG) 12.5 MG tablet, Take 1 tablet (12.5 mg total) by mouth 2 (two) times daily., Disp: 180 tablet, Rfl: 1    omega-3 fatty acids/fish oil (FISH OIL-OMEGA-3 FATTY ACIDS) 300-1,000 mg capsule, Take 1 capsule by mouth once daily., Disp: , Rfl:     pantoprazole (PROTONIX) 40 MG tablet, Take 1 tablet (40 mg total) by mouth 2 (two) times daily., Disp: 60 tablet, Rfl: 2    Labs:274}    I have reviewed old labs below:  Lab Results   Component Value Date    WBC 7.38 02/21/2023    RBC 3.15 (L) 02/21/2023    HGB 8.5 (L) 02/23/2023    HCT 25.1 (L) 02/23/2023     02/21/2023     02/23/2023    K 4.5 02/23/2023     (H) 02/23/2023    CALCIUM 7.9 (L) 02/23/2023     02/23/2023    BUN 19 (H) 02/23/2023    CREATININE 0.93 02/23/2023    ALT 19 02/21/2023    AST 11 (L) 02/21/2023    INR 1.10 02/21/2023       Medical History: 274}     Past Medical History:   Diagnosis Date    Atrial fibrillation     Bradycardia     COVID-19 11/01/2021    Hiatal hernia with gastroesophageal reflux disease and esophagitis     Hyperlipidemia     Hypertension     Valvular heart disease     Type: TR    White coat syndrome with diagnosis of hypertension       Social History     Tobacco Use   Smoking Status Never   Smokeless Tobacco Never      Past Surgical History:   Procedure Laterality Date    ABLATION      Dr. Mazariegos at [facility]    SHOULDER SURGERY Left     TONSILLECTOMY        Objective:  274}   BP (!) 154/98 (BP Location: Left arm)   Pulse 64   Temp 97.8 °F (36.6 °C) (Oral)   Resp 20   Ht 5' 6" (1.676 m)   Wt 66.1 kg (145 lb 12.8 oz)   SpO2 98%   BMI 23.53 kg/m²     Wt Readings from Last 3 Encounters:   03/02/23 66.1 kg (145 lb 12.8 oz)   02/21/23 68 kg (150 lb)   11/15/22 67.9 kg (149 lb 9.6 oz)     BP Readings from Last 3 Encounters:   03/02/23 " (!) 154/98   02/23/23 130/79   12/12/22 (!) 146/96     Body mass index is 23.53 kg/m².     Physical Exam  Vitals and nursing note reviewed.   Constitutional:       General: He is not in acute distress.     Appearance: Normal appearance. He is not ill-appearing.   HENT:      Head: Normocephalic.   Eyes:      Conjunctiva/sclera: Conjunctivae normal.   Cardiovascular:      Rate and Rhythm: Normal rate and regular rhythm.      Heart sounds: Normal heart sounds.   Pulmonary:      Effort: Pulmonary effort is normal. No respiratory distress.      Breath sounds: Normal breath sounds.   Abdominal:      Palpations: Abdomen is soft.      Tenderness: There is no abdominal tenderness.   Musculoskeletal:      Cervical back: Neck supple.   Skin:     General: Skin is warm and dry.   Neurological:      Mental Status: He is alert and oriented to person, place, and time.        Assessment and Plan: 274}     1. Upper GI bleed  Comments:  Symptoms now resolved, continue pantoprazole  Orders:  -     CBC Auto Differential; Future; Expected date: 03/02/2023    2. Acute blood loss anemia  Comments:  Stable, f/u lab today  Orders:  -     CBC Auto Differential; Future; Expected date: 03/02/2023  -     Iron and TIBC; Future; Expected date: 03/02/2023  -     Ferritin; Future; Expected date: 03/02/2023    3. Primary hypertension  Comments:  Not controlled, reports home blood pressures are good but has white coat syndrome.  Orders:  -     Comprehensive Metabolic Panel; Future; Expected date: 03/02/2023  -     Lipid Panel; Future; Expected date: 03/02/2023  -     TSH; Future; Expected date: 03/02/2023    4. Paroxysmal atrial fibrillation  Comments:  Cardiology referral sent to see if he can get a sooner appointment to discuss Eliquis and other treatment options  Overview:  On Eliquis for CVA prophylaxis      Orders:  -     Ambulatory referral/consult to Cardiology; Future; Expected date: 03/09/2023    5. Encounter for long-term (current) use of  other medications  -     Comprehensive Metabolic Panel; Future; Expected date: 03/02/2023  -     TSH; Future; Expected date: 03/02/2023    6. Need for hepatitis C screening test  -     Hepatitis C Antibody; Future; Expected date: 03/02/2023    7. Prostate cancer screening  -     PSA, Screening; Future; Expected date: 03/02/2023    8. Need for pneumococcal vaccine  -     Pneumococcal Conjugate Vaccine (20 Valent) (IM)       Return to clinic for AWV as scheduled; and sooner as needed.    Future Appointments   Date Time Provider Department Center   3/23/2023  2:00 PM AWV NURSE, [facility] VICTOR HUGO [place]   5/9/2023 11:00 AM [facility] GI ROOM 01 [facility]   5/17/2023  8:30 AM [facility] CARD [facility]        Signature:  ULYSSES Gabriel

## 2023-03-05 DIAGNOSIS — R97.20 ELEVATED PSA, BETWEEN 10 AND LESS THAN 20 NG/ML: Primary | ICD-10-CM

## 2023-03-06 ENCOUNTER — TELEPHONE (OUTPATIENT)
Dept: FAMILY MEDICINE | Facility: CLINIC | Age: 78
End: 2023-03-06
Payer: MEDICARE

## 2023-03-06 DIAGNOSIS — R97.20 ELEVATED PSA, BETWEEN 10 AND LESS THAN 20 NG/ML: Primary | ICD-10-CM

## 2023-03-06 NOTE — TELEPHONE ENCOUNTER
----- Message from Nguyen Crabtree RN sent at 3/6/2023  1:10 PM CST -----  Regarding: FW: schedule externally    ----- Message -----  From: Sahila Buchanan  Sent: 3/6/2023   8:08 AM CST  To: Jodi FLORES Staff  Subject: schedule externally                              Please schedule externally. Dr Mcfarlane not taking new pt's with this diagnosis

## 2023-03-06 NOTE — PROGRESS NOTES
He has not seen my results message and I received notice back from Dr. Mcfarlane's office to send an external referral so I have put in a referral to Dr. Filiberto Garcia at [facility] for elevated PSA.

## 2023-03-16 RX ORDER — AMLODIPINE BESYLATE 2.5 MG/1
2.5 TABLET ORAL DAILY
Qty: 90 TABLET | Refills: 1 | Status: SHIPPED | OUTPATIENT
Start: 2023-03-16 | End: 2023-08-14 | Stop reason: SDUPTHER

## 2023-03-16 NOTE — PROGRESS NOTES
[facility]     PATIENT NAME: Ruben Alarcon   : 1945    AGE: 77 y.o. DATE: 2023   MRN: [id number]        Reason for Visit / Chief Complaint: Medicare AWV (Subsequent Medicare AWV visit )        Ruben Alarcon presents for a Subsequent Medicare AWV today.    Review of Systems   Constitutional: Negative.    Respiratory: Negative.     Cardiovascular: Negative.    Gastrointestinal: Negative.    Genitourinary: Negative.    Neurological: Negative.       The following components were reviewed and updated:      Medical/Social/Family History:  Past Medical History:   Diagnosis Date    Atrial fibrillation     Bradycardia     COVID-19 2021    Hiatal hernia with gastroesophageal reflux disease and esophagitis     Hyperlipidemia     Hypertension     Valvular heart disease     Type: TR    White coat syndrome with diagnosis of hypertension         Family History   Problem Relation Age of Onset    Hypertension Mother     Hypertension Father     Cancer Sister     Leukemia Sister     Cancer Brother     Prostate cancer Brother     Cancer Brother     Bladder Cancer Brother         Past Surgical History:   Procedure Laterality Date    [facility]    SHOULDER SURGERY Left     TONSILLECTOMY         Social History     Tobacco Use   Smoking Status Never    Passive exposure: Never   Smokeless Tobacco Never       Social History     Substance and Sexual Activity   Alcohol Use Never         Allergies and Current Medications   Review of patient's allergies indicates:  No Known Allergies    Current Outpatient Medications:     amLODIPine (NORVASC) 2.5 MG tablet, Take 1 tablet (2.5 mg total) by mouth once daily., Disp: 90 tablet, Rfl: 1    apixaban (ELIQUIS) 5 mg Tab, Take 1 tablet (5 mg total) by mouth 2 (two) times daily., Disp: 180 tablet, Rfl: 3    carvediloL (COREG) 12.5 MG tablet, Take 1 tablet (12.5 mg total) by mouth 2 (two) times daily., Disp:  180 tablet, Rfl: 1    omega-3 fatty acids/fish oil (FISH OIL-OMEGA-3 FATTY ACIDS) 300-1,000 mg capsule, Take 1 capsule by mouth once daily., Disp: , Rfl:     pantoprazole (PROTONIX) 40 MG tablet, Take 1 tablet (40 mg total) by mouth 2 (two) times daily., Disp: 60 tablet, Rfl: 2      Health Risk Assessment   Fall Risk:  not at risk   Obesity: BMI Body mass index is 24.21 kg/m².   Advance Directive: no but information given   Depression: PHQ9- 0   HTN: DASH diet, exercise, weight management, med compliance, home BP monitoring, and follow-up discussed.   T2DM: no  STI: not at risk   Statin Use: no      Health Maintenance   Last eye exam: saw Dr. Wolff 2022   Last CV screen with lipids: 3/2/23   Diabetes screening with fasting glucose or A1c: 3/2/23   Colonoscopy: cologuard ordered this visit   Flu Vaccine: declined   Pneumonia vaccines: 3/2/23   COVID vaccine: declined   Hep B vaccine: na   DEXA: na   Last PSA screen: 3/2/23   AAA screening: na   HIV Screening: not at risk  Hepatitis C Screen: 3/2/23 non-reactive  Low Dose CT Scan: na    Health Maintenance Topics with due status: Not Due       Topic Last Completion Date    Lipid Panel 03/02/2023     Health Maintenance Due   Topic Date Due    TETANUS VACCINE  02/01/2013         Lab results available in Epic or see dates from [facility] above:   Lab Results   Component Value Date    CHOL 182 03/02/2023     Lab Results   Component Value Date    HDL 47 03/02/2023     Lab Results   Component Value Date    LDLCALC 119 03/02/2023     Lab Results   Component Value Date    TRIG 82 03/02/2023     Lab Results   Component Value Date    CHOLHDL 3.9 03/02/2023       No results found for: LABA1C, HGBA1C    Sodium   Date Value Ref Range Status   03/02/2023 138 136 - 145 mmol/L Final     Potassium   Date Value Ref Range Status   03/02/2023 4.5 3.5 - 5.1 mmol/L Final     Chloride   Date Value Ref Range Status   03/02/2023 109 (H) 98 - 107 mmol/L Final     CO2   Date Value Ref Range Status  "  03/02/2023 25 21 - 32 mmol/L Final     Glucose   Date Value Ref Range Status   03/02/2023 94 74 - 106 mg/dL Final     BUN   Date Value Ref Range Status   03/02/2023 15 7 - 18 mg/dL Final     Creatinine   Date Value Ref Range Status   03/02/2023 0.96 0.70 - 1.30 mg/dL Final     Calcium   Date Value Ref Range Status   03/02/2023 8.5 8.5 - 10.1 mg/dL Final     Total Protein   Date Value Ref Range Status   03/02/2023 6.6 6.4 - 8.2 g/dL Final     Albumin   Date Value Ref Range Status   03/02/2023 3.6 3.5 - 5.0 g/dL Final     Bilirubin, Total   Date Value Ref Range Status   03/02/2023 0.3 >0.0 - 1.2 mg/dL Final     Alk Phos   Date Value Ref Range Status   03/02/2023 50 45 - 115 U/L Final     AST   Date Value Ref Range Status   03/02/2023 17 15 - 37 U/L Final     ALT   Date Value Ref Range Status   03/02/2023 16 16 - 61 U/L Final     Anion Gap   Date Value Ref Range Status   03/02/2023 9 7 - 16 mmol/L Final         Lab Results   Component Value Date    PSA 10.400 (H) 03/02/2023          Incontinence  Bowel: no  Bladder: no      Care Team  SAVAGE Zapata FNP -PCP                 VICTORIA Davis - cardiology                 Dr. Wolff -optometry    **See Completed Assessments for Annual Wellness visit within the encounter summary    The following assessments were completed & reviewed:  Depression Screening  Cognitive function Screening  Timed Get Up Test  Whisper Test  Vision Screen  Health Risk Assessment  Checklist of ADLs and IADLs        Objective  Vitals:    03/23/23 1423   BP: 130/80   Pulse: 72   Resp: 16   Temp: 98.2 °F (36.8 °C)   TempSrc: Oral   SpO2: 98%   Weight: 68 kg (150 lb)   Height: 5' 6" (1.676 m)   PainSc: 0-No pain      Body mass index is 24.21 kg/m².  Ideal body weight: 63.8 kg (140 lb 10.5 oz)       Physical Exam  Vitals and nursing note reviewed.   Constitutional:       General: He is not in acute distress.     Appearance: Normal appearance. He is not ill-appearing.   HENT:      Head: Normocephalic.   Eyes: "      Conjunctiva/sclera: Conjunctivae normal.   Cardiovascular:      Rate and Rhythm: Normal rate and regular rhythm.      Heart sounds: Normal heart sounds.   Pulmonary:      Effort: Pulmonary effort is normal. No respiratory distress.      Breath sounds: Normal breath sounds.   Musculoskeletal:      Cervical back: Neck supple.   Skin:     General: Skin is warm and dry.   Neurological:      Mental Status: He is alert and oriented to person, place, and time.         Assessment:     1. Encounter for subsequent annual wellness visit (AWV) in Medicare patient    2. Paroxysmal atrial fibrillation  Comments:  Controlled, followed by Cardiology  Overview:  On Eliquis for CVA prophylaxis        3. Pure hypercholesterolemia  Comments:  Control, on fish oil daily.    4. Screening for colon cancer  -     Cologuard Screening (Multitarget Stool DNA); Future; Expected date: 03/23/2023    5. BMI 24.0-24.9, adult    6. Primary hypertension  Comments:  Controlled, continue current treatment.    7. Elevated PSA, between 10 and less than 20 ng/ml  Comments:  Referral appointment with Dr. Filiberto Garcia pending.       Plan:    Discussed and provided with a screening schedule and personal prevention plan in accordance with USPSTF age appropriate recommendations and Medicare screening guidelines.   Education, counseling, and referrals were provided as needed.  After Visit Summary printed and given to patient which includes written education and a list of any referrals if indicated.     Education including diet, exercise, falls, preventive health care for older adults and advanced directives discussed with patient and patient verbalized understanding.      F/u plan for yearly AWV.    Signature:  Juli Zapata [facility]

## 2023-03-23 ENCOUNTER — OFFICE VISIT (OUTPATIENT)
Dept: FAMILY MEDICINE | Facility: CLINIC | Age: 78
End: 2023-03-23
Payer: MEDICARE

## 2023-03-23 VITALS
DIASTOLIC BLOOD PRESSURE: 80 MMHG | BODY MASS INDEX: 24.11 KG/M2 | WEIGHT: 150 LBS | HEIGHT: 66 IN | SYSTOLIC BLOOD PRESSURE: 130 MMHG | TEMPERATURE: 98 F | OXYGEN SATURATION: 98 % | RESPIRATION RATE: 16 BRPM | HEART RATE: 72 BPM

## 2023-03-23 DIAGNOSIS — Z12.11 SCREENING FOR COLON CANCER: ICD-10-CM

## 2023-03-23 DIAGNOSIS — I48.0 PAROXYSMAL ATRIAL FIBRILLATION: Chronic | ICD-10-CM

## 2023-03-23 DIAGNOSIS — Z00.00 ENCOUNTER FOR SUBSEQUENT ANNUAL WELLNESS VISIT (AWV) IN MEDICARE PATIENT: Primary | ICD-10-CM

## 2023-03-23 DIAGNOSIS — R97.20 ELEVATED PSA, BETWEEN 10 AND LESS THAN 20 NG/ML: ICD-10-CM

## 2023-03-23 DIAGNOSIS — I10 PRIMARY HYPERTENSION: Chronic | ICD-10-CM

## 2023-03-23 DIAGNOSIS — E78.00 PURE HYPERCHOLESTEROLEMIA: Chronic | ICD-10-CM

## 2023-03-23 PROCEDURE — G0439 PPPS, SUBSEQ VISIT: HCPCS | Mod: ,,, | Performed by: NURSE PRACTITIONER

## 2023-03-23 PROCEDURE — G0439 PR MEDICARE ANNUAL WELLNESS SUBSEQUENT VISIT: ICD-10-PCS | Mod: ,,, | Performed by: NURSE PRACTITIONER

## 2023-03-23 NOTE — PATIENT INSTRUCTIONS
Counseling and Referral of Other Preventative  (Italic type indicates deductible and co-insurance are waived)    Patient Name: Ruben Alarcon  Today's Date: 3/23/2023    Health Maintenance         Date Due Completion Date    TETANUS VACCINE 02/01/2013 2/1/2003    Influenza Vaccine (1) 06/30/2023 (Originally 9/1/2022) ---    Shingles Vaccine (1 of 2) 03/02/2024 (Originally 10/28/1995) ---    Lipid Panel 03/02/2028 3/2/2023          No orders of the defined types were placed in this encounter.

## 2023-03-27 ENCOUNTER — OFFICE VISIT (OUTPATIENT)
Dept: CARDIOLOGY | Facility: CLINIC | Age: 78
End: 2023-03-27
Payer: MEDICARE

## 2023-03-27 VITALS
DIASTOLIC BLOOD PRESSURE: 88 MMHG | BODY MASS INDEX: 24.53 KG/M2 | OXYGEN SATURATION: 98 % | WEIGHT: 152.63 LBS | SYSTOLIC BLOOD PRESSURE: 148 MMHG | HEART RATE: 81 BPM | HEIGHT: 66 IN

## 2023-03-27 DIAGNOSIS — I48.0 PAROXYSMAL ATRIAL FIBRILLATION: Chronic | ICD-10-CM

## 2023-03-27 DIAGNOSIS — K92.2 UPPER GI BLEED: Chronic | ICD-10-CM

## 2023-03-27 DIAGNOSIS — I10 PRIMARY HYPERTENSION: Chronic | ICD-10-CM

## 2023-03-27 DIAGNOSIS — K92.2 GASTROINTESTINAL HEMORRHAGE, UNSPECIFIED GASTROINTESTINAL HEMORRHAGE TYPE: Primary | ICD-10-CM

## 2023-03-27 PROCEDURE — 99214 OFFICE O/P EST MOD 30 MIN: CPT | Mod: S$PBB,,, | Performed by: NURSE PRACTITIONER

## 2023-03-27 PROCEDURE — 99214 PR OFFICE/OUTPT VISIT, EST, LEVL IV, 30-39 MIN: ICD-10-PCS | Mod: S$PBB,,, | Performed by: NURSE PRACTITIONER

## 2023-03-27 PROCEDURE — 99214 OFFICE O/P EST MOD 30 MIN: CPT | Mod: PBBFAC | Performed by: NURSE PRACTITIONER

## 2023-03-27 RX ORDER — FERROUS SULFATE 325(65) MG
325 TABLET ORAL
COMMUNITY
End: 2024-03-27

## 2023-03-27 NOTE — PROGRESS NOTES
PCP: ULYSSES Gabriel    Referring Provider:     Subjective:   Ruben Alarcon is a 77 y.o. male with hx of atrial fibrillation s/p ablation by Dr. Avery, HTN,  and recent upper GI bleed,  who presents for follow up to discuss options for therapy after recent upper GI bleeding. He is now back on his Eliquis for CVA prophylaxis but would like to be referred to Dr. Avery again to be considered for the Watchman device.         Fhx:  Family History   Problem Relation Age of Onset    Hypertension Mother     Hypertension Father     Cancer Sister     Leukemia Sister     Cancer Brother     Prostate cancer Brother     Cancer Brother     Bladder Cancer Brother      Shx:   Social History     Socioeconomic History    Marital status:    Tobacco Use    Smoking status: Never     Passive exposure: Never    Smokeless tobacco: Never   Substance and Sexual Activity    Alcohol use: Never    Drug use: Never    Sexual activity: Yes     Partners: Female     Social Determinants of Health     Financial Resource Strain: Low Risk     Difficulty of Paying Living Expenses: Not hard at all   Food Insecurity: No Food Insecurity    Worried About Running Out of Food in the Last Year: Never true    Ran Out of Food in the Last Year: Never true   Transportation Needs: No Transportation Needs    Lack of Transportation (Medical): No    Lack of Transportation (Non-Medical): No   Physical Activity: Inactive    Days of Exercise per Week: 0 days    Minutes of Exercise per Session: 0 min   Stress: No Stress Concern Present    Feeling of Stress : Not at all   Social Connections: Socially Integrated    Frequency of Communication with Friends and Family: More than three times a week    Frequency of Social Gatherings with Friends and Family: More than three times a week    Attends Scientologist Services: More than 4 times per year    Active Member of Clubs or Organizations: Yes    Attends Club or Organization Meetings: More than 4 times per year     Marital Status:    Housing Stability: Low Risk     Unable to Pay for Housing in the Last Year: No    Number of Places Lived in the Last Year: 1    Unstable Housing in the Last Year: No       EKG   Results for orders placed or performed during the hospital encounter of 02/21/23   EKG 12-lead    Collection Time: 02/21/23  8:36 PM    Narrative    Test Reason : K92.2,    Vent. Rate : 080 BPM     Atrial Rate : 000 BPM     P-R Int : 160 ms          QRS Dur : 104 ms      QT Int : 394 ms       P-R-T Axes : 066 -23 024 degrees     QTc Int : 429 ms    Sinus rhythm  Leftward axis  Incomplete RBBB  Anterior T wave abnormality  is nonspecific  Borderline ECG      Referred By: [email or web address]   SELF           Confirmed By:             Lab Results   Component Value Date     03/02/2023    K 4.5 03/02/2023     (H) 03/02/2023    CO2 25 03/02/2023    BUN 15 03/02/2023    CREATININE 0.96 03/02/2023    CALCIUM 8.5 03/02/2023    ANIONGAP 9 03/02/2023       Lab Results   Component Value Date    CHOL 182 03/02/2023     Lab Results   Component Value Date    HDL 47 03/02/2023     Lab Results   Component Value Date    LDLCALC 119 03/02/2023     Lab Results   Component Value Date    TRIG 82 03/02/2023     Lab Results   Component Value Date    CHOLHDL 3.9 03/02/2023       Lab Results   Component Value Date    WBC 5.63 03/02/2023    HGB 9.6 (L) 03/02/2023    HCT 29.2 (L) 03/02/2023    MCV 91.8 03/02/2023     03/02/2023           Current Outpatient Medications:     amLODIPine (NORVASC) 2.5 MG tablet, Take 1 tablet (2.5 mg total) by mouth once daily., Disp: 90 tablet, Rfl: 1    apixaban (ELIQUIS) 5 mg Tab, Take 1 tablet (5 mg total) by mouth 2 (two) times daily., Disp: 180 tablet, Rfl: 3    carvediloL (COREG) 12.5 MG tablet, Take 1 tablet (12.5 mg total) by mouth 2 (two) times daily., Disp: 180 tablet, Rfl: 1    ferrous sulfate (FEOSOL) 325 mg (65 mg iron) Tab tablet, Take 325 mg by mouth daily with breakfast., Disp: , Rfl:      "omega-3 fatty acids/fish oil (FISH OIL-OMEGA-3 FATTY ACIDS) 300-1,000 mg capsule, Take 1 capsule by mouth once daily., Disp: , Rfl:     pantoprazole (PROTONIX) 40 MG tablet, Take 1 tablet (40 mg total) by mouth 2 (two) times daily., Disp: 60 tablet, Rfl: 2  Meds reviewed and reconciled using the list provided but he patient.     Review of Systems   Respiratory:  Negative for shortness of breath.    Cardiovascular:  Negative for chest pain, palpitations, orthopnea, claudication, leg swelling and PND.   Neurological:  Negative for dizziness, loss of consciousness and weakness.         Objective:   BP (!) 148/88 (BP Location: Left arm, Patient Position: Sitting)   Pulse 81   Ht 5' 6" (1.676 m)   Wt 69.2 kg (152 lb 9.6 oz)   SpO2 98%   BMI 24.63 kg/m²     Physical Exam  Vitals and nursing note reviewed.   Constitutional:       Appearance: Normal appearance. He is obese.   HENT:      Head: Normocephalic and atraumatic.   Neck:      Vascular: No carotid bruit.   Cardiovascular:      Rate and Rhythm: Normal rate and regular rhythm.      Pulses: Normal pulses.      Heart sounds: Normal heart sounds.   Pulmonary:      Effort: Pulmonary effort is normal.      Breath sounds: Normal breath sounds.   Abdominal:      Palpations: Abdomen is soft.   Musculoskeletal:      Cervical back: Neck supple.      Right lower leg: No edema.      Left lower leg: No edema.   Skin:     General: Skin is warm and dry.      Capillary Refill: Capillary refill takes less than 2 seconds.   Neurological:      Mental Status: He is alert.         Assessment:     1. Gastrointestinal hemorrhage, unspecified gastrointestinal hemorrhage type  Ambulatory referral/consult to Electrophysiology      2. Paroxysmal atrial fibrillation  Ambulatory referral/consult to Cardiology    Ambulatory referral/consult to Electrophysiology    Cardiology referral sent to see if he can get a sooner appointment to discuss Eliquis and other treatment options      3. Primary " hypertension        4. Upper GI bleed              Plan:   Atrial fibrillation  Recent upper GI bleed  He is interested in being evaluated for a Watchman device given the recent events.     Primary hypertension  bp log reviewed  bp ranging from 122/77 to 136/77 on Norvasc 2.5 mg po bid    Upper GI bleed  Patient is interested in being evaluated for the Watchman device at [facility].    RTC: 6 months

## 2023-03-27 NOTE — ASSESSMENT & PLAN NOTE
Recent upper GI bleed  He is interested in being evaluated for a Watchman device given the recent events.

## 2023-04-13 LAB — NONINV COLON CA DNA+OCC BLD SCRN STL QL: NEGATIVE

## 2023-04-25 ENCOUNTER — TELEPHONE (OUTPATIENT)
Dept: FAMILY MEDICINE | Facility: CLINIC | Age: 78
End: 2023-04-25
Payer: MEDICARE

## 2023-04-25 NOTE — TELEPHONE ENCOUNTER
Nguyen, this is another Dr. Garcia referral that I ordered 3/6/23.  Please check on appt and let them know.  Thanks

## 2023-04-25 NOTE — TELEPHONE ENCOUNTER
----- Message from Monica Chavez sent at 4/25/2023 12:02 PM CDT -----  Pt has a referral in back in March and he hasnt heard anything out about referral for PSA PT # [id number]  or [phone] this his Daughter

## 2023-05-09 ENCOUNTER — HOSPITAL ENCOUNTER (OUTPATIENT)
Dept: GASTROENTEROLOGY | Facility: HOSPITAL | Age: 78
Discharge: HOME | End: 2023-05-09
Attending: INTERNAL MEDICINE
Payer: MEDICARE

## 2023-05-09 ENCOUNTER — ANESTHESIA EVENT (OUTPATIENT)
Dept: GASTROENTEROLOGY | Facility: HOSPITAL | Age: 78
End: 2023-05-09
Payer: MEDICARE

## 2023-05-09 ENCOUNTER — ANESTHESIA (OUTPATIENT)
Dept: GASTROENTEROLOGY | Facility: HOSPITAL | Age: 78
End: 2023-05-09
Payer: MEDICARE

## 2023-05-09 VITALS
WEIGHT: 150 LBS | RESPIRATION RATE: 12 BRPM | TEMPERATURE: 98 F | SYSTOLIC BLOOD PRESSURE: 145 MMHG | BODY MASS INDEX: 24.21 KG/M2 | HEART RATE: 55 BPM | OXYGEN SATURATION: 99 % | DIASTOLIC BLOOD PRESSURE: 86 MMHG

## 2023-05-09 DIAGNOSIS — Z12.11 COLON CANCER SCREENING: Primary | ICD-10-CM

## 2023-05-09 DIAGNOSIS — K20.90 ESOPHAGITIS: ICD-10-CM

## 2023-05-09 DIAGNOSIS — K20.80 LOS ANGELES GRADE C ESOPHAGITIS: ICD-10-CM

## 2023-05-09 DIAGNOSIS — Z71.89 COMPLEX CARE COORDINATION: ICD-10-CM

## 2023-05-09 PROCEDURE — 25000003 PHARM REV CODE 250

## 2023-05-09 PROCEDURE — D9220A PRA ANESTHESIA: ICD-10-PCS | Mod: ,,,

## 2023-05-09 PROCEDURE — 43239 EGD BIOPSY SINGLE/MULTIPLE: CPT | Performed by: INTERNAL MEDICINE

## 2023-05-09 PROCEDURE — 43239 PR EGD, FLEX, W/BIOPSY, SGL/MULTI: ICD-10-PCS | Mod: ,,, | Performed by: INTERNAL MEDICINE

## 2023-05-09 PROCEDURE — 88305 SURGICAL PATHOLOGY: ICD-10-PCS | Mod: 26,,, | Performed by: PATHOLOGY

## 2023-05-09 PROCEDURE — 63600175 PHARM REV CODE 636 W HCPCS

## 2023-05-09 PROCEDURE — 37000009 HC ANESTHESIA EA ADD 15 MINS

## 2023-05-09 PROCEDURE — D9220A PRA ANESTHESIA: Mod: ,,,

## 2023-05-09 PROCEDURE — 88305 TISSUE EXAM BY PATHOLOGIST: CPT | Mod: 26,,, | Performed by: PATHOLOGY

## 2023-05-09 PROCEDURE — 88305 TISSUE EXAM BY PATHOLOGIST: CPT | Mod: TC,SUR | Performed by: INTERNAL MEDICINE

## 2023-05-09 PROCEDURE — 37000008 HC ANESTHESIA 1ST 15 MINUTES

## 2023-05-09 PROCEDURE — 43239 EGD BIOPSY SINGLE/MULTIPLE: CPT | Mod: ,,, | Performed by: INTERNAL MEDICINE

## 2023-05-09 RX ORDER — PROPOFOL 10 MG/ML
VIAL (ML) INTRAVENOUS
Status: DISCONTINUED | OUTPATIENT
Start: 2023-05-09 | End: 2023-05-09

## 2023-05-09 RX ORDER — PANTOPRAZOLE SODIUM 40 MG/1
40 TABLET, DELAYED RELEASE ORAL DAILY
Qty: 90 TABLET | Refills: 3 | Status: SHIPPED | OUTPATIENT
Start: 2023-05-09 | End: 2024-02-29

## 2023-05-09 RX ORDER — LIDOCAINE HYDROCHLORIDE 20 MG/ML
INJECTION, SOLUTION EPIDURAL; INFILTRATION; INTRACAUDAL; PERINEURAL
Status: DISCONTINUED | OUTPATIENT
Start: 2023-05-09 | End: 2023-05-09

## 2023-05-09 RX ORDER — SODIUM CHLORIDE 0.9 % (FLUSH) 0.9 %
10 SYRINGE (ML) INJECTION
Status: CANCELLED | OUTPATIENT
Start: 2023-05-09

## 2023-05-09 RX ORDER — SODIUM CHLORIDE 9 MG/ML
INJECTION, SOLUTION INTRAVENOUS CONTINUOUS PRN
Status: DISCONTINUED | OUTPATIENT
Start: 2023-05-09 | End: 2023-05-09

## 2023-05-09 RX ADMIN — LIDOCAINE HYDROCHLORIDE 80 MG: 20 INJECTION, SOLUTION INTRAVENOUS at 10:05

## 2023-05-09 RX ADMIN — SODIUM CHLORIDE: 9 INJECTION, SOLUTION INTRAVENOUS at 10:05

## 2023-05-09 RX ADMIN — PROPOFOL 30 MG: 10 INJECTION, EMULSION INTRAVENOUS at 10:05

## 2023-05-09 RX ADMIN — PROPOFOL 80 MG: 10 INJECTION, EMULSION INTRAVENOUS at 10:05

## 2023-05-09 NOTE — TRANSFER OF CARE
Anesthesia Transfer of Care Note    Patient: Ruben Alarcon    Procedure(s) Performed: EGD    Patient location: GI    Anesthesia Type: general and MAC    Transport from OR: Transported from OR on room air with adequate spontaneous ventilation    Post pain: adequate analgesia    Post assessment: no apparent anesthetic complications    Post vital signs: stable    Level of consciousness: responds to stimulation    Nausea/Vomiting: no nausea/vomiting    Complications: none    Transfer of care protocol was followed      Last vitals:   Visit Vitals  BP (!) 159/88   Pulse 68   Temp 36.6 °C (97.9 °F)   Resp 12   Wt 68 kg (150 lb)   SpO2 98%   BMI 24.21 kg/m²

## 2023-05-09 NOTE — ANESTHESIA PREPROCEDURE EVALUATION
05/09/2023  Ruben Alarcon is a 77 y.o., male.      Pre-op Assessment    I have reviewed the Patient Summary Reports.     I have reviewed the Nursing Notes. I have reviewed the NPO Status.   I have reviewed the Medications.     Review of Systems  Anesthesia Hx:  No problems with previous Anesthesia    Social:  Non-Smoker, No Alcohol Use    Hematology/Oncology:  Hematology Normal   Oncology Normal     EENT/Dental:EENT/Dental Normal   Cardiovascular:   Hypertension Valvular problems/Murmurs Dysrhythmias hyperlipidemia    Pulmonary:  Pulmonary Normal    Renal/:  Renal/ Normal     Hepatic/GI:   Hiatal Hernia, GERD    Musculoskeletal:  Musculoskeletal Normal    Neurological:   Neuromuscular Disease,    Endocrine:  Endocrine Normal    Dermatological:  Skin Normal    Psych:  Psychiatric Normal           Physical Exam  General: Well nourished, Cooperative, Alert and Oriented    Airway:  Mallampati: II   Mouth Opening: Normal  TM Distance: Normal  Tongue: Normal  Neck ROM: Normal ROM    Dental:  Intact    Chest/Lungs:  Clear to auscultation, Normal Respiratory Rate    Heart:  Rate: Normal  Rhythm: Regular Rhythm  Sounds: Normal    Abdomen:  Normal, Soft, Nontender    advanced age    Anesthesia Plan  Type of Anesthesia, risks & benefits discussed:    Anesthesia Type: Gen Natural Airway, MAC  Intra-op Monitoring Plan: Standard ASA Monitors  Post Op Pain Control Plan: multimodal analgesia and IV/PO Opioids PRN  Induction:  IV  Informed Consent: Informed consent signed with the Patient and all parties understand the risks and agree with anesthesia plan.  All questions answered.   ASA Score: 3  Day of Surgery Review of History & Physical: I have interviewed and examined the patient. I have reviewed the patient's H&P dated:     Ready For Surgery From Anesthesia Perspective.     .

## 2023-05-09 NOTE — H&P
Gastroenterology Pre-procedure H&P    Chief Complaint: [place] grade C esophagitis    History of Present Illness    Ruben Alarcon is a 77 y.o. male that  has a past medical history of Atrial fibrillation, Bradycardia, COVID-19 (11/01/2021), Hiatal hernia with gastroesophageal reflux disease and esophagitis, Hyperlipidemia, Hypertension, Valvular heart disease, and White coat syndrome with diagnosis of hypertension.     Patient here for follow up of grade C reflux esophagitis. Held Eliquis for 24h. Denies dysphagia today.      Past Medical History:   Diagnosis Date    Atrial fibrillation     Bradycardia     COVID-19 11/01/2021    Hiatal hernia with gastroesophageal reflux disease and esophagitis     Hyperlipidemia     Hypertension     Valvular heart disease     Type: TR    White coat syndrome with diagnosis of hypertension        Past Surgical History:   Procedure Laterality Date    ABLATION      Dr. Mazariegos at [facility]    EYE SURGERY Bilateral     cataract removal    SHOULDER SURGERY Left     TONSILLECTOMY         Family History   Problem Relation Age of Onset    Hypertension Mother     Hypertension Father     Cancer Sister     Leukemia Sister     Cancer Brother     Prostate cancer Brother     Cancer Brother     Bladder Cancer Brother        Social History     Socioeconomic History    Marital status:    Tobacco Use    Smoking status: Never     Passive exposure: Never    Smokeless tobacco: Never   Substance and Sexual Activity    Alcohol use: Never    Drug use: Never    Sexual activity: Yes     Partners: Female     Social Determinants of Health     Financial Resource Strain: Low Risk     Difficulty of Paying Living Expenses: Not hard at all   Food Insecurity: No Food Insecurity    Worried About Running Out of Food in the Last Year: Never true    Ran Out of Food in the Last Year: Never true   Transportation Needs: No Transportation Needs    Lack of Transportation (Medical): No    Lack of Transportation  (Non-Medical): No   Physical Activity: Inactive    Days of Exercise per Week: 0 days    Minutes of Exercise per Session: 0 min   Stress: No Stress Concern Present    Feeling of Stress : Not at all   Social Connections: Socially Integrated    Frequency of Communication with Friends and Family: More than three times a week    Frequency of Social Gatherings with Friends and Family: More than three times a week    Attends Ep[employer] Services: More than 4 times per year    Active Member of Clubs or Organizations: Yes    Attends Club or Organization Meetings: More than 4 times per year    Marital Status:    Housing Stability: Low Risk     Unable to Pay for Housing in the Last Year: No    Number of Places Lived in the Last Year: 1    Unstable Housing in the Last Year: No       Current Outpatient Medications   Medication Sig Dispense Refill    amLODIPine (NORVASC) 2.5 MG tablet Take 1 tablet (2.5 mg total) by mouth once daily. 90 tablet 1    apixaban (ELIQUIS) 5 mg Tab Take 1 tablet (5 mg total) by mouth 2 (two) times daily. 180 tablet 3    carvediloL (COREG) 12.5 MG tablet Take 1 tablet (12.5 mg total) by mouth 2 (two) times daily. 180 tablet 1    ferrous sulfate (FEOSOL) 325 mg (65 mg iron) Tab tablet Take 325 mg by mouth daily with breakfast.      omega-3 fatty acids/fish oil (FISH OIL-OMEGA-3 FATTY ACIDS) 300-1,000 mg capsule Take 1 capsule by mouth once daily.      pantoprazole (PROTONIX) 40 MG tablet Take 1 tablet (40 mg total) by mouth 2 (two) times daily. 60 tablet 2     No current facility-administered medications for this encounter.     Facility-Administered Medications Ordered in Other Encounters   Medication Dose Route Frequency Provider Last Rate Last Admin    0.9%  NaCl infusion   Intravenous Continuous PRN Marino Heredia CRNA 150 mL/hr at 05/09/23 1043 New Bag at 05/09/23 1043       Review of patient's allergies indicates:  No Known Allergies    Objective:  Vitals:    05/09/23 1039   BP: (!) 179/93    Pulse: (!) 58   Resp: 18   SpO2: 97%   Weight: 68 kg (150 lb)        GEN: normal appearing, NAD, AAO x3  HENT: NCAT, anicteric, OP benign  CV: bradycardic, regular rhythm  RESP: NABS, symmetric rise, unlabored  ABD: soft, ND, no guarding or TTP  SKIN: warm and dry  NEURO: grossly afocal    Assessment and Plan:    Proceed with:    EGD for esophagitis surveillance     Mina Chavez MD  Gastroenterology

## 2023-05-09 NOTE — ANESTHESIA POSTPROCEDURE EVALUATION
Anesthesia Post Evaluation    Patient: Ruben Alarcon    Procedure(s) Performed: EGD    Final Anesthesia Type: general      Patient location during evaluation: GI PACU  Patient participation: Yes- Able to Participate  Level of consciousness: awake and alert  Post-procedure vital signs: reviewed and stable  Pain management: adequate  Airway patency: patent    PONV status at discharge: No PONV  Anesthetic complications: no      Cardiovascular status: blood pressure returned to baseline  Respiratory status: unassisted and spontaneous ventilation  Hydration status: euvolemic  Follow-up not needed.          Vitals Value Taken Time   /86 05/09/23 1132   Temp 98f 05/09/23 1210   Pulse 55 05/09/23 1132   Resp 12 05/09/23 1132   SpO2 99 % 05/09/23 1132         Event Time   Out of Recovery 11:35:04         Pain/Spencer Score: Spencer Score: 10 (5/9/2023 11:32 AM)

## 2023-05-09 NOTE — LETTER
May 17, 2023        Ruben Alarcon  [address] [place] [address]           [facility]  [address] [facility] [place] [address]  Phone: [phone]  Fax: [phone] Dear Mr. Alarcon:    Component    Case Report   Surgical Pathology                                Case: [id number]                                    Authorizing Provider:  Mina Chavez MD            Collected:           05/09/2023 10:55 AM           Ordering Location:     [facility] -         Received:            05/09/2023 12:08 PM                                  Endoscopy                                                                     Pathologist:           Lopez Scott MD                                                             Specimen:    Esophagus, esophageal / ge junction bx rule out Barretts       Final Diagnosis   A. 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 Mcgowan's esophagus is technically a precancer condition, but the probability of progression to cancer is exceedingly low, especially in your case with only a focal area of change. If you desire to undergo surveillance for this condition, the recommendation would be to repeat EGD in 5 years for surveillance. Continue current medications; continue protonix daily for life. Please call my office with question. Keep your appointment for colonoscopy.     Sincerely,  Mina Chavez MD  Gastroenterology

## 2023-05-09 NOTE — DISCHARGE INSTRUCTIONS
Procedure Date  5/9/23     Impression  Overall Impression:   - 4-cm hiatal hernia  - Short segment salmon colored mucosa, biopsied  - The esophagus, stomach, and examined duodenum were otherwise normal     Recommendation    Await pathology results  Interval healing of reflux esophagitis  Can reduce protonix to once a day dosing - continue indefinitely   Resume Eliquis today  Follow up in GI clinic as needed or if you decide you would like to be scheduled for colonoscopy - can call my office, and we can schedule this for you.       NO DRIVING, OPERATING EQUIPMENT, OR SIGNING LEGAL DOCUMENTS FOR 24 HOURS.  THE NURSE WILL CALL YOU WITH YOUR BIOPSY RESULTS IN A FEW DAYS.

## 2023-05-10 LAB
DHEA SERPL-MCNC: NORMAL
ESTROGEN SERPL-MCNC: NORMAL PG/ML
INSULIN SERPL-ACNC: NORMAL U[IU]/ML
LAB AP GROSS DESCRIPTION: NORMAL
LAB AP LABORATORY NOTES: NORMAL
T3RU NFR SERPL: NORMAL %

## 2023-05-10 NOTE — PROGRESS NOTES
Jodi, thank you for referring this patient to me. His EGD did reveal a focal area consistent with mcgowan's esophagus. If he wants to undergo surveillance, then we could consider repeating EGD in 5 years. We have scheduled him for a colonoscopy for screening purposes. Please let me know if you have any questions regarding this patient.

## 2023-06-26 PROBLEM — K92.2 UPPER GI BLEED: Chronic | Status: RESOLVED | Noted: 2023-02-21 | Resolved: 2023-06-26

## 2023-08-14 DIAGNOSIS — I48.91 ATRIAL FIBRILLATION, UNSPECIFIED TYPE: ICD-10-CM

## 2023-08-14 RX ORDER — CARVEDILOL 12.5 MG/1
12.5 TABLET ORAL 2 TIMES DAILY
Qty: 180 TABLET | Refills: 1 | Status: SHIPPED | OUTPATIENT
Start: 2023-08-14 | End: 2023-08-16 | Stop reason: SDUPTHER

## 2023-08-14 RX ORDER — AMLODIPINE BESYLATE 2.5 MG/1
2.5 TABLET ORAL DAILY
Qty: 90 TABLET | Refills: 1 | Status: SHIPPED | OUTPATIENT
Start: 2023-08-14 | End: 2023-09-27 | Stop reason: DRUGHIGH

## 2023-08-16 DIAGNOSIS — I48.91 ATRIAL FIBRILLATION, UNSPECIFIED TYPE: ICD-10-CM

## 2023-08-16 RX ORDER — CARVEDILOL 12.5 MG/1
12.5 TABLET ORAL 2 TIMES DAILY
Qty: 180 TABLET | Refills: 1 | Status: SHIPPED | OUTPATIENT
Start: 2023-08-16 | End: 2024-02-12 | Stop reason: SDUPTHER

## 2023-09-20 ENCOUNTER — PATIENT MESSAGE (OUTPATIENT)
Dept: ADMINISTRATIVE | Facility: HOSPITAL | Age: 78
End: 2023-09-20

## 2023-09-27 ENCOUNTER — OFFICE VISIT (OUTPATIENT)
Dept: CARDIOLOGY | Facility: CLINIC | Age: 78
End: 2023-09-27
Payer: MEDICARE

## 2023-09-27 VITALS
DIASTOLIC BLOOD PRESSURE: 98 MMHG | HEIGHT: 66 IN | BODY MASS INDEX: 23.11 KG/M2 | WEIGHT: 143.81 LBS | OXYGEN SATURATION: 98 % | SYSTOLIC BLOOD PRESSURE: 148 MMHG | HEART RATE: 62 BPM

## 2023-09-27 DIAGNOSIS — I48.0 PAROXYSMAL ATRIAL FIBRILLATION: ICD-10-CM

## 2023-09-27 DIAGNOSIS — I10 HYPERTENSION, UNSPECIFIED TYPE: Primary | ICD-10-CM

## 2023-09-27 DIAGNOSIS — I10 PRIMARY HYPERTENSION: Chronic | ICD-10-CM

## 2023-09-27 PROCEDURE — 93010 EKG 12-LEAD: ICD-10-PCS | Mod: S$PBB,,, | Performed by: INTERNAL MEDICINE

## 2023-09-27 PROCEDURE — 93005 ELECTROCARDIOGRAM TRACING: CPT | Mod: PBBFAC | Performed by: INTERNAL MEDICINE

## 2023-09-27 PROCEDURE — 99214 OFFICE O/P EST MOD 30 MIN: CPT | Mod: PBBFAC | Performed by: NURSE PRACTITIONER

## 2023-09-27 PROCEDURE — 99214 PR OFFICE/OUTPT VISIT, EST, LEVL IV, 30-39 MIN: ICD-10-PCS | Mod: S$PBB,,, | Performed by: NURSE PRACTITIONER

## 2023-09-27 PROCEDURE — 93010 ELECTROCARDIOGRAM REPORT: CPT | Mod: S$PBB,,, | Performed by: INTERNAL MEDICINE

## 2023-09-27 PROCEDURE — 99214 OFFICE O/P EST MOD 30 MIN: CPT | Mod: S$PBB,,, | Performed by: NURSE PRACTITIONER

## 2023-09-27 RX ORDER — SIMETHICONE 125 MG
125 TABLET,CHEWABLE ORAL EVERY 6 HOURS PRN
COMMUNITY
End: 2024-03-27

## 2023-09-27 RX ORDER — ACETAMINOPHEN 500 MG
TABLET ORAL DAILY
COMMUNITY

## 2023-09-27 RX ORDER — CALCIUM CARBONATE 600 MG
600 TABLET ORAL DAILY
COMMUNITY
End: 2024-03-28

## 2023-09-27 RX ORDER — AMLODIPINE BESYLATE 5 MG/1
5 TABLET ORAL DAILY
Qty: 90 TABLET | Refills: 3 | Status: SHIPPED | OUTPATIENT
Start: 2023-09-27 | End: 2024-09-26

## 2023-09-27 RX ORDER — BICALUTAMIDE 50 MG/1
50 TABLET, FILM COATED ORAL DAILY
COMMUNITY
Start: 2023-08-23 | End: 2024-03-27

## 2023-09-27 NOTE — PROGRESS NOTES
PCP: Juli Zapata FNP    Referring Provider:     Subjective:   Ruben Alarcon is a 77 y.o. male with hx of atrial fibrillation s/p ablation by Dr. Avery, HTN,  and recent upper GI bleed,  who presents for routine follow up. He denies cardiac complaints.   The patient has recently been diagnosed with cancer and is having radiation therapy. Dr. Avery has recommended postponing the Watchman implant until after his radiation therapy has been completed.   3/27/2023 presents for follow up to discuss options for therapy after recent upper GI bleeding. He is now back on his Eliquis for CVA prophylaxis but would like to be referred to Dr. Avery again to be considered for the Watchman device.         Fhx:  Family History   Problem Relation Age of Onset    Hypertension Mother     Hypertension Father     Cancer Sister     Leukemia Sister     Cancer Brother     Prostate cancer Brother     Cancer Brother     Bladder Cancer Brother      Shx:   Social History     Socioeconomic History    Marital status:    Tobacco Use    Smoking status: Never     Passive exposure: Never    Smokeless tobacco: Never   Substance and Sexual Activity    Alcohol use: Never    Drug use: Never    Sexual activity: Yes     Partners: Female     Social Determinants of Health     Financial Resource Strain: Low Risk  (2/22/2023)    Overall Financial Resource Strain (CARDIA)     Difficulty of Paying Living Expenses: Not hard at all   Food Insecurity: No Food Insecurity (2/22/2023)    Hunger Vital Sign     Worried About Running Out of Food in the Last Year: Never true     Ran Out of Food in the Last Year: Never true   Transportation Needs: No Transportation Needs (2/22/2023)    PRAPARE - Transportation     Lack of Transportation (Medical): No     Lack of Transportation (Non-Medical): No   Physical Activity: Inactive (2/22/2023)    Exercise Vital Sign     Days of Exercise per Week: 0 days     Minutes of Exercise per Session: 0 min   Stress: No  Stress Concern Present (2/22/2023)    [place] [employer] - Occupational Stress Questionnaire     Feeling of Stress : Not at all   Social Connections: Socially Integrated (2/22/2023)    Social Connection and Isolation Panel [NHANES]     Frequency of Communication with Friends and Family: More than three times a week     Frequency of Social Gatherings with Friends and Family: More than three times a week     Attends Yazdanism Services: More than 4 times per year     Active Member of Clubs or Organizations: Yes     Attends Club or Organization Meetings: More than 4 times per year     Marital Status:    Housing Stability: Low Risk  (2/22/2023)    Housing Stability Vital Sign     Unable to Pay for Housing in the Last Year: No     Number of Places Lived in the Last Year: 1     Unstable Housing in the Last Year: No       EKG   Results for orders placed or performed during the hospital encounter of 02/21/23   EKG 12-lead    Collection Time: 02/21/23  8:36 PM    Narrative    Test Reason : K92.2,    Vent. Rate : 080 BPM     Atrial Rate : 000 BPM     P-R Int : 160 ms          QRS Dur : 104 ms      QT Int : 394 ms       P-R-T Axes : 066 -23 024 degrees     QTc Int : 429 ms    Sinus rhythm  Leftward axis  Incomplete RBBB  Anterior T wave abnormality  is nonspecific  Borderline ECG    Confirmed by Hasmukh REMY, Christian EDWARDS ([id number]) on 4/18/2023 12:16:05 PM    Referred By: [email or web address]   SELF           Confirmed By:Christian Noe MD            Lab Results   Component Value Date     03/02/2023    K 4.5 03/02/2023     (H) 03/02/2023    CO2 25 03/02/2023    BUN 15 03/02/2023    CREATININE 0.96 03/02/2023    CALCIUM 8.5 03/02/2023    ANIONGAP 9 03/02/2023       Lab Results   Component Value Date    CHOL 182 03/02/2023     Lab Results   Component Value Date    HDL 47 03/02/2023     Lab Results   Component Value Date    LDLCALC 119 03/02/2023     Lab Results   Component Value Date    TRIG 82 03/02/2023  "    Lab Results   Component Value Date    CHOLHDL 3.9 03/02/2023       Lab Results   Component Value Date    WBC 5.63 03/02/2023    HGB 9.6 (L) 03/02/2023    HCT 29.2 (L) 03/02/2023    MCV 91.8 03/02/2023     03/02/2023           Current Outpatient Medications:     bicalutamide (CASODEX) 50 MG Tab, Take 50 mg by mouth once daily., Disp: , Rfl:     calcium carbonate (OS-NEREIDA) 600 mg calcium (1,500 mg) Tab, Take 600 mg by mouth once daily., Disp: , Rfl:     carvediloL (COREG) 12.5 MG tablet, Take 1 tablet (12.5 mg total) by mouth 2 (two) times daily., Disp: 180 tablet, Rfl: 1    cholecalciferol, vitamin D3, (VITAMIN D3) 50 mcg (2,000 unit) Cap capsule, Take by mouth once daily., Disp: , Rfl:     ferrous sulfate (FEOSOL) 325 mg (65 mg iron) Tab tablet, Take 325 mg by mouth daily with breakfast., Disp: , Rfl:     omega-3 fatty acids/fish oil (FISH OIL-OMEGA-3 FATTY ACIDS) 300-1,000 mg capsule, Take 1 capsule by mouth once daily., Disp: , Rfl:     pantoprazole (PROTONIX) 40 MG tablet, Take 1 tablet (40 mg total) by mouth once daily., Disp: 90 tablet, Rfl: 3    simethicone (MYLICON) 125 MG chewable tablet, Take 125 mg by mouth every 6 (six) hours as needed for Flatulence., Disp: , Rfl:     amLODIPine (NORVASC) 5 MG tablet, Take 1 tablet (5 mg total) by mouth once daily., Disp: 90 tablet, Rfl: 3    apixaban (ELIQUIS) 5 mg Tab, Take 1 tablet (5 mg total) by mouth 2 (two) times daily., Disp: 180 tablet, Rfl: 3  Meds reviewed and reconciled.      Review of Systems   Respiratory:  Negative for shortness of breath.    Cardiovascular:  Negative for chest pain, palpitations, orthopnea, claudication, leg swelling and PND.   Neurological:  Negative for dizziness, loss of consciousness and weakness.           Objective:   BP (!) 148/98 (BP Location: Left arm, Patient Position: Sitting)   Pulse 62   Ht 5' 6" (1.676 m)   Wt 65.2 kg (143 lb 12.8 oz)   SpO2 98%   BMI 23.21 kg/m²     Physical Exam  Vitals and nursing note " reviewed.   Constitutional:       Appearance: Normal appearance. He is normal weight.   HENT:      Head: Normocephalic and atraumatic.   Neck:      Vascular: No carotid bruit.   Cardiovascular:      Rate and Rhythm: Normal rate and regular rhythm.      Pulses: Normal pulses.      Heart sounds: Normal heart sounds.   Pulmonary:      Effort: Pulmonary effort is normal.      Breath sounds: Normal breath sounds.   Abdominal:      Palpations: Abdomen is soft.   Musculoskeletal:      Cervical back: Neck supple.      Right lower leg: No edema.      Left lower leg: No edema.   Skin:     General: Skin is warm and dry.      Capillary Refill: Capillary refill takes less than 2 seconds.   Neurological:      Mental Status: He is alert.           Assessment:     1. Hypertension, unspecified type  EKG 12-lead    amLODIPine (NORVASC) 5 MG tablet    EKG 12-lead      2. Paroxysmal atrial fibrillation  apixaban (ELIQUIS) 5 mg Tab      3. Primary hypertension              Plan:   Atrial fibrillation  Patient has recently been diagnosed with cancer and is having radiation therapy. Dr. Avery has recommended postponing the Watchman implant until after his radiation therapy has been completed.     Primary hypertension  148/98 mmHg   Repeat 140/98 mmHg  Increase Norvasc to 5 mg po daily  BP check in 2 weeks    RTC: 6 months

## 2023-09-27 NOTE — ASSESSMENT & PLAN NOTE
Patient has recently been diagnosed with cancer and is having radiation therapy. Dr. Avery has recommended postponing the Watchman implant until after his radiation therapy has been completed.

## 2023-10-12 ENCOUNTER — CLINICAL SUPPORT (OUTPATIENT)
Dept: CARDIOLOGY | Facility: CLINIC | Age: 78
End: 2023-10-12
Payer: MEDICARE

## 2023-10-12 VITALS — DIASTOLIC BLOOD PRESSURE: 74 MMHG | HEART RATE: 64 BPM | OXYGEN SATURATION: 97 % | SYSTOLIC BLOOD PRESSURE: 110 MMHG

## 2023-10-12 PROCEDURE — 99212 OFFICE O/P EST SF 10 MIN: CPT | Mod: PBBFAC

## 2023-11-02 DIAGNOSIS — Z12.11 SCREENING FOR COLON CANCER: Primary | ICD-10-CM

## 2023-11-02 RX ORDER — POLYETHYLENE GLYCOL 3350, SODIUM SULFATE ANHYDROUS, SODIUM BICARBONATE, SODIUM CHLORIDE, POTASSIUM CHLORIDE 236; 22.74; 6.74; 5.86; 2.97 G/4L; G/4L; G/4L; G/4L; G/4L
4 POWDER, FOR SOLUTION ORAL ONCE
Qty: 4000 ML | Refills: 0 | Status: SHIPPED | OUTPATIENT
Start: 2023-11-02 | End: 2023-11-02

## 2023-11-30 ENCOUNTER — ANESTHESIA (OUTPATIENT)
Dept: GASTROENTEROLOGY | Facility: HOSPITAL | Age: 78
End: 2023-11-30
Payer: MEDICARE

## 2023-11-30 ENCOUNTER — ANESTHESIA EVENT (OUTPATIENT)
Dept: GASTROENTEROLOGY | Facility: HOSPITAL | Age: 78
End: 2023-11-30
Payer: MEDICARE

## 2023-11-30 ENCOUNTER — HOSPITAL ENCOUNTER (OUTPATIENT)
Dept: GASTROENTEROLOGY | Facility: HOSPITAL | Age: 78
Discharge: HOME | End: 2023-11-30
Attending: INTERNAL MEDICINE
Payer: MEDICARE

## 2023-11-30 VITALS
OXYGEN SATURATION: 100 % | TEMPERATURE: 98 F | BODY MASS INDEX: 23.89 KG/M2 | WEIGHT: 148 LBS | SYSTOLIC BLOOD PRESSURE: 134 MMHG | HEART RATE: 54 BPM | RESPIRATION RATE: 11 BRPM | DIASTOLIC BLOOD PRESSURE: 74 MMHG

## 2023-11-30 DIAGNOSIS — Z12.11 COLON CANCER SCREENING: ICD-10-CM

## 2023-11-30 PROCEDURE — 25000003 PHARM REV CODE 250: Performed by: NURSE ANESTHETIST, CERTIFIED REGISTERED

## 2023-11-30 PROCEDURE — 45385 COLONOSCOPY W/LESION REMOVAL: CPT | Mod: PT | Performed by: INTERNAL MEDICINE

## 2023-11-30 PROCEDURE — D9220A PRA ANESTHESIA: Mod: PT,,, | Performed by: NURSE ANESTHETIST, CERTIFIED REGISTERED

## 2023-11-30 PROCEDURE — 27000284 HC CANNULA NASAL: Performed by: NURSE ANESTHETIST, CERTIFIED REGISTERED

## 2023-11-30 PROCEDURE — 27201423 OPTIME MED/SURG SUP & DEVICES STERILE SUPPLY

## 2023-11-30 PROCEDURE — 63600175 PHARM REV CODE 636 W HCPCS: Performed by: NURSE ANESTHETIST, CERTIFIED REGISTERED

## 2023-11-30 PROCEDURE — 27000716 HC OXISENSOR PROBE, ANY SIZE: Performed by: NURSE ANESTHETIST, CERTIFIED REGISTERED

## 2023-11-30 PROCEDURE — 88305 SURGICAL PATHOLOGY: ICD-10-PCS | Mod: 26,,, | Performed by: PATHOLOGY

## 2023-11-30 PROCEDURE — 37000008 HC ANESTHESIA 1ST 15 MINUTES

## 2023-11-30 PROCEDURE — 45385 COLONOSCOPY W/LESION REMOVAL: CPT | Mod: PT,,, | Performed by: INTERNAL MEDICINE

## 2023-11-30 PROCEDURE — 88305 TISSUE EXAM BY PATHOLOGIST: CPT | Mod: 26,,, | Performed by: PATHOLOGY

## 2023-11-30 PROCEDURE — 88305 TISSUE EXAM BY PATHOLOGIST: CPT | Mod: TC,SUR | Performed by: INTERNAL MEDICINE

## 2023-11-30 PROCEDURE — 37000009 HC ANESTHESIA EA ADD 15 MINS

## 2023-11-30 PROCEDURE — 45385 PR COLONOSCOPY,REMV LESN,SNARE: ICD-10-PCS | Mod: PT,,, | Performed by: INTERNAL MEDICINE

## 2023-11-30 PROCEDURE — D9220A PRA ANESTHESIA: ICD-10-PCS | Mod: PT,,, | Performed by: NURSE ANESTHETIST, CERTIFIED REGISTERED

## 2023-11-30 RX ORDER — PROPOFOL 10 MG/ML
VIAL (ML) INTRAVENOUS
Status: DISCONTINUED | OUTPATIENT
Start: 2023-11-30 | End: 2023-11-30

## 2023-11-30 RX ORDER — SODIUM CHLORIDE 0.9 % (FLUSH) 0.9 %
10 SYRINGE (ML) INJECTION
Status: DISCONTINUED | OUTPATIENT
Start: 2023-11-30 | End: 2023-12-01 | Stop reason: HOSPADM

## 2023-11-30 RX ORDER — LIDOCAINE HYDROCHLORIDE 20 MG/ML
INJECTION, SOLUTION EPIDURAL; INFILTRATION; INTRACAUDAL; PERINEURAL
Status: DISCONTINUED | OUTPATIENT
Start: 2023-11-30 | End: 2023-11-30

## 2023-11-30 RX ADMIN — SODIUM CHLORIDE: 9 INJECTION, SOLUTION INTRAVENOUS at 07:11

## 2023-11-30 RX ADMIN — LIDOCAINE HYDROCHLORIDE 100 MG: 20 INJECTION, SOLUTION INTRAVENOUS at 08:11

## 2023-11-30 RX ADMIN — PROPOFOL 100 MG: 10 INJECTION, EMULSION INTRAVENOUS at 08:11

## 2023-11-30 RX ADMIN — PROPOFOL 50 MG: 10 INJECTION, EMULSION INTRAVENOUS at 08:11

## 2023-11-30 NOTE — ANESTHESIA POSTPROCEDURE EVALUATION
Anesthesia Post Evaluation    Patient: Ruben Alarcon    Procedure(s) Performed: Colonoscopy    Final Anesthesia Type: general      Patient location during evaluation: GI PACU  Patient participation: Yes- Able to Participate  Level of consciousness: awake and alert  Post-procedure vital signs: reviewed and stable  Pain management: adequate  Airway patency: patent    PONV status at discharge: No PONV  Anesthetic complications: no      Cardiovascular status: blood pressure returned to baseline and hemodynamically stable  Respiratory status: spontaneous ventilation  Hydration status: euvolemic  Follow-up not needed.  Comments: Refer to nursing notes for pain/wilber score upon discharge from recovery.              Vitals Value Taken Time   /93 11/30/23 0858   Temp 97.5 11/30/23 1551   Pulse 55 11/30/23 0859   Resp 16 11/30/23 0859   SpO2 99 % 11/30/23 0859   Vitals shown include unvalidated device data.      Event Time   Out of Recovery 09:10:27         Pain/Wilber Score: Wilber Score: 8 (11/30/2023  8:49 AM)

## 2023-11-30 NOTE — ANESTHESIA PREPROCEDURE EVALUATION
11/30/2023  Ruben Alarcon is a 78 y.o., male.      Pre-op Assessment    I have reviewed the Patient Summary Reports.     I have reviewed the Nursing Notes. I have reviewed the NPO Status.   I have reviewed the Medications.     Review of Systems  Anesthesia Hx:  No problems with previous Anesthesia                Social:  Non-Smoker, No Alcohol Use       Hematology/Oncology:       -- Anemia:                    --  Cancer in past history:              radiation   Oncology Comments: History of prostate cancer     Cardiovascular:     Hypertension, well controlled Valvular problems/Murmurs   Dysrhythmias atrial fibrillation      hyperlipidemia                             Renal/:    BPH              Hepatic/GI:    Hiatal Hernia, GERD, well controlled             Neurological:    Neuromuscular Disease,                                       Physical Exam  General: Well nourished, Cooperative, Alert and Oriented    Airway:  Mallampati: III   Mouth Opening: Normal  TM Distance: < 4 cm  Tongue: Normal  Neck ROM: Normal ROM    Dental:  Dentures        Anesthesia Plan  Type of Anesthesia, risks & benefits discussed:    Anesthesia Type: Gen Natural Airway, MAC  Intra-op Monitoring Plan: Standard ASA Monitors  Post Op Pain Control Plan: multimodal analgesia and IV/PO Opioids PRN  Induction:  IV  Informed Consent: Informed consent signed with the Patient and all parties understand the risks and agree with anesthesia plan.  All questions answered. Patient consented to blood products? Yes  ASA Score: 3  Day of Surgery Review of History & Physical: I have interviewed and examined the patient. I have reviewed the patient's H&P dated: There are no significant changes.     Ready For Surgery From Anesthesia Perspective.     .  Past Medical History:   Diagnosis Date    Atrial fibrillation     Bradycardia     COVID-19 11/01/2021     Hiatal hernia with gastroesophageal reflux disease and esophagitis     Hyperlipidemia     Hypertension     Valvular heart disease     Type: TR    White coat syndrome with diagnosis of hypertension        Past Surgical History:   Procedure Laterality Date    ABLATION      Dr. Mazariegos at [facility]    EYE SURGERY Bilateral     cataract removal    SHOULDER SURGERY Left     TONSILLECTOMY         Family History   Problem Relation Age of Onset    Hypertension Mother     Hypertension Father     Cancer Sister     Leukemia Sister     Cancer Brother     Prostate cancer Brother     Cancer Brother     Bladder Cancer Brother        Social History     Socioeconomic History    Marital status:    Tobacco Use    Smoking status: Never     Passive exposure: Never    Smokeless tobacco: Never   Substance and Sexual Activity    Alcohol use: Never    Drug use: Never    Sexual activity: Yes     Partners: Female     Social Determinants of Health     Financial Resource Strain: Low Risk  (2/22/2023)    Overall Financial Resource Strain (CARDIA)     Difficulty of Paying Living Expenses: Not hard at all   Food Insecurity: No Food Insecurity (2/22/2023)    Hunger Vital Sign     Worried About Running Out of Food in the Last Year: Never true     Ran Out of Food in the Last Year: Never true   Transportation Needs: No Transportation Needs (2/22/2023)    PRAPARE - Transportation     Lack of Transportation (Medical): No     Lack of Transportation (Non-Medical): No   Physical Activity: Inactive (2/22/2023)    Exercise Vital Sign     Days of Exercise per Week: 0 days     Minutes of Exercise per Session: 0 min   Stress: No Stress Concern Present (2/22/2023)    [employer] [employer] [place] [employer] - Occupational Stress Questionnaire     Feeling of Stress : Not at all   Social Connections: Socially Integrated (2/22/2023)    Social Connection and Isolation Panel [NHANES]     Frequency of Communication with Friends and Family: More than three times a  week     Frequency of Social Gatherings with Friends and Family: More than three times a week     Attends [employer] Services: More than 4 times per year     Active Member of Clubs or Organizations: Yes     Attends Club or Organization Meetings: More than 4 times per year     Marital Status:    Housing Stability: Low Risk  (2/22/2023)    Housing Stability Vital Sign     Unable to Pay for Housing in the Last Year: No     Number of Places Lived in the Last Year: 1     Unstable Housing in the Last Year: No       Current Outpatient Medications   Medication Sig Dispense Refill    amLODIPine (NORVASC) 5 MG tablet Take 1 tablet (5 mg total) by mouth once daily. 90 tablet 3    apixaban (ELIQUIS) 5 mg Tab Take 1 tablet (5 mg total) by mouth 2 (two) times daily. 180 tablet 3    bicalutamide (CASODEX) 50 MG Tab Take 50 mg by mouth once daily.      calcium carbonate (OS-NEREIDA) 600 mg calcium (1,500 mg) Tab Take 600 mg by mouth once daily.      carvediloL (COREG) 12.5 MG tablet Take 1 tablet (12.5 mg total) by mouth 2 (two) times daily. 180 tablet 1    cholecalciferol, vitamin D3, (VITAMIN D3) 50 mcg (2,000 unit) Cap capsule Take by mouth once daily.      ferrous sulfate (FEOSOL) 325 mg (65 mg iron) Tab tablet Take 325 mg by mouth daily with breakfast.      omega-3 fatty acids/fish oil (FISH OIL-OMEGA-3 FATTY ACIDS) 300-1,000 mg capsule Take 1 capsule by mouth once daily.      pantoprazole (PROTONIX) 40 MG tablet Take 1 tablet (40 mg total) by mouth once daily. 90 tablet 3    simethicone (MYLICON) 125 MG chewable tablet Take 125 mg by mouth every 6 (six) hours as needed for Flatulence.       No current facility-administered medications for this encounter.       Review of patient's allergies indicates:  No Known Allergies   Patient Active Problem List   Diagnosis    Atrial fibrillation    Bradycardia    Hyperlipidemia    Primary hypertension    Valvular heart disease    Acute blood loss anemia    Elevated PSA, between 10 and  less than 20 ng/ml    [place] grade C esophagitis

## 2023-11-30 NOTE — TRANSFER OF CARE
Anesthesia Transfer of Care Note    Patient: Ruben Alarcon    Procedure(s) Performed: * No procedures listed *    Patient location: GI    Anesthesia Type: general    Transport from OR: Transported from OR on room air with adequate spontaneous ventilation. Continuous ECG monitoring in transport. Continuous SpO2 monitoring in transport    Post pain: adequate analgesia    Post assessment: no apparent anesthetic complications    Post vital signs: stable    Level of consciousness: sedated and responds to stimulation    Nausea/Vomiting: no nausea/vomiting    Complications: none    Transfer of care protocol was followedComments: Good SV continue, NAD, VSS, RTRN      Last vitals: Visit Vitals  /70 (BP Location: Left arm, Patient Position: Lying)   Pulse 62   Temp 36.4 °C (97.5 °F) (Oral)   Resp 16   Wt 67.1 kg (148 lb)   SpO2 100%   BMI 23.89 kg/m²

## 2023-11-30 NOTE — DISCHARGE INSTRUCTIONS
Procedure Date  11/30/23     Impression  Overall Impression:   Four polyps measuring from 4 mm up to 12 mm in the ascending colon; removed by cold snare; removed by hot snare  Two polyps measuring 10-19 mm in the transverse colon; removed by hot snare  Scattered diverticulosis of mild severity in the descending colon and sigmoid colon  The terminal ileum, ileocecal valve and cecum appeared normal.  (grade 1) hemorrhoids     Recommendation    Await pathology results     Repeat colonoscopy in 3 years if you wish to continue with colon cancer screening; alternatively, given your age and high quality exam today, it would also be reasonable to forego any further screening colonoscopies   OK to resume Eliquis tomorrow       Outcome of procedure: successful Colonoscopy  Disposition: patient to recovery following procedure; discharge to home when appropriate parameters met  Provisions for follow up: please call my office for any unexpected symptoms like chest or abdominal pain or bleeding following your procedure.  Final Diagnosis: colon polyps      Indication  Colon cancer screening

## 2023-11-30 NOTE — H&P
Gastroenterology Pre-procedure H&P    Chief Complaint: Colon cancer screening    History of Present Illness    Ruben Alarcon is a 78 y.o. male that  has a past medical history of Atrial fibrillation, Bradycardia, Cancer, COVID-19 (11/01/2021), Hiatal hernia with gastroesophageal reflux disease and esophagitis, Hyperlipidemia, Hypertension, Valvular heart disease, and White coat syndrome with diagnosis of hypertension.     Patient here for routine screening. On eliquis, held for 3 days.    Patient denies wt loss, abdominal pain, diarrhea, melena/hematochezia, change in stool caliber, FMHx of GI related malignancies.      Past Medical History:   Diagnosis Date    Atrial fibrillation     Bradycardia     Cancer     prostate    COVID-19 11/01/2021    Hiatal hernia with gastroesophageal reflux disease and esophagitis     Hyperlipidemia     Hypertension     Valvular heart disease     Type: TR    White coat syndrome with diagnosis of hypertension        Past Surgical History:   Procedure Laterality Date    ABLATION      Dr. Mazariegos at [facility]    EYE SURGERY Bilateral     cataract removal    SHOULDER SURGERY Left     TONSILLECTOMY         Family History   Problem Relation Age of Onset    Hypertension Mother     Hypertension Father     Cancer Sister     Leukemia Sister     Cancer Brother     Prostate cancer Brother     Cancer Brother     Bladder Cancer Brother        Social History     Socioeconomic History    Marital status:    Tobacco Use    Smoking status: Never     Passive exposure: Never    Smokeless tobacco: Never   Substance and Sexual Activity    Alcohol use: Never    Drug use: Never    Sexual activity: Yes     Partners: Female     Social Determinants of Health     Financial Resource Strain: Low Risk  (2/22/2023)    Overall Financial Resource Strain (CARDIA)     Difficulty of Paying Living Expenses: Not hard at all   Food Insecurity: No Food Insecurity (2/22/2023)    Hunger Vital Sign     Worried About Running Out  of Food in the Last Year: Never true     Ran Out of Food in the Last Year: Never true   Transportation Needs: No Transportation Needs (2/22/2023)    PRAPARE - Transportation     Lack of Transportation (Medical): No     Lack of Transportation (Non-Medical): No   Physical Activity: Inactive (2/22/2023)    Exercise Vital Sign     Days of Exercise per Week: 0 days     Minutes of Exercise per Session: 0 min   Stress: No Stress Concern Present (2/22/2023)    [place] [employer] - Occupational Stress Questionnaire     Feeling of Stress : Not at all   Social Connections: Socially Integrated (2/22/2023)    Social Connection and Isolation Panel [NHANES]     Frequency of Communication with Friends and Family: More than three times a week     Frequency of Social Gatherings with Friends and Family: More than three times a week     Attends Restorationist Services: More than 4 times per year     Active Member of Clubs or Organizations: Yes     Attends Club or Organization Meetings: More than 4 times per year     Marital Status:    Housing Stability: Low Risk  (2/22/2023)    Housing Stability Vital Sign     Unable to Pay for Housing in the Last Year: No     Number of Places Lived in the Last Year: 1     Unstable Housing in the Last Year: No       Current Outpatient Medications   Medication Sig Dispense Refill    amLODIPine (NORVASC) 5 MG tablet Take 1 tablet (5 mg total) by mouth once daily. 90 tablet 3    apixaban (ELIQUIS) 5 mg Tab Take 1 tablet (5 mg total) by mouth 2 (two) times daily. 180 tablet 3    bicalutamide (CASODEX) 50 MG Tab Take 50 mg by mouth once daily.      calcium carbonate (OS-NEREIDA) 600 mg calcium (1,500 mg) Tab Take 600 mg by mouth once daily.      carvediloL (COREG) 12.5 MG tablet Take 1 tablet (12.5 mg total) by mouth 2 (two) times daily. 180 tablet 1    cholecalciferol, vitamin D3, (VITAMIN D3) 50 mcg (2,000 unit) Cap capsule Take by mouth once daily.      ferrous sulfate (FEOSOL) 325 mg  (65 mg iron) Tab tablet Take 325 mg by mouth daily with breakfast.      omega-3 fatty acids/fish oil (FISH OIL-OMEGA-3 FATTY ACIDS) 300-1,000 mg capsule Take 1 capsule by mouth once daily.      pantoprazole (PROTONIX) 40 MG tablet Take 1 tablet (40 mg total) by mouth once daily. 90 tablet 3    simethicone (MYLICON) 125 MG chewable tablet Take 125 mg by mouth every 6 (six) hours as needed for Flatulence.       No current facility-administered medications for this encounter.     Facility-Administered Medications Ordered in Other Encounters   Medication Dose Route Frequency Provider Last Rate Last Admin    LIDOcaine (PF) 20 mg/mL (2%) injection   Intravenous PRN Vinod Ahumada, CRNA   100 mg at 11/30/23 0803    propofol (DIPRIVAN) 10 mg/mL infusion   Intravenous PRN Vinod Ahumada, CRNA   100 mg at 11/30/23 0803    sodium chloride 0.9% infusion   Intravenous Continuous PRN Vinod Ahumada, CRNA 125 mL/hr at 11/30/23 0757 New Bag at 11/30/23 0757       Review of patient's allergies indicates:  No Known Allergies    Objective:  Vitals:    11/30/23 0801   BP: (!) 157/88   Pulse: 72   Resp: 16   SpO2: 98%   Weight: 67.1 kg (148 lb)        GEN: normal appearing, NAD, AAO x3  HENT: NCAT, anicteric, OP benign  CV: normal rate, regular rhythm  RESP: NABS, symmetric rise, unlabored  ABD: soft, ND, no guarding or TTP  SKIN: warm and dry  NEURO: grossly afocal    Assessment and Plan:    Proceed with:    Colonoscopy for screening for colon cancer    Mina Chavez MD  Gastroenterology

## 2023-12-01 LAB
ESTROGEN SERPL-MCNC: NORMAL PG/ML
INSULIN SERPL-ACNC: NORMAL U[IU]/ML
LAB AP GROSS DESCRIPTION: NORMAL
LAB AP LABORATORY NOTES: NORMAL
T3RU NFR SERPL: NORMAL %

## 2023-12-01 NOTE — PROGRESS NOTES
Mrs. Zapata, thank you for referring this patient to me. I recommend repeat colonoscopy in 3 years if he wishes to continue with screening. Would also be reasonable to forego any further screening exams given age and recent quality exam. Please let me know if you have any questions regarding this patient.

## 2023-12-09 DIAGNOSIS — Z71.89 COMPLEX CARE COORDINATION: ICD-10-CM

## 2024-02-12 DIAGNOSIS — I48.91 ATRIAL FIBRILLATION, UNSPECIFIED TYPE: ICD-10-CM

## 2024-02-12 RX ORDER — CARVEDILOL 12.5 MG/1
12.5 TABLET ORAL 2 TIMES DAILY
Qty: 180 TABLET | Refills: 1 | Status: SHIPPED | OUTPATIENT
Start: 2024-02-12

## 2024-02-29 DIAGNOSIS — K20.80 LOS ANGELES GRADE C ESOPHAGITIS: ICD-10-CM

## 2024-02-29 RX ORDER — PANTOPRAZOLE SODIUM 40 MG/1
40 TABLET, DELAYED RELEASE ORAL
Qty: 90 TABLET | Refills: 3 | Status: SHIPPED | OUTPATIENT
Start: 2024-02-29

## 2024-03-21 NOTE — PROGRESS NOTES
"   [facility] MEDICINE       PATIENT NAME: Ruben Alarcon   : 1945    AGE: 78 y.o. DATE OF ENCOUNTER: 3/28/24    MRN: [id number]      Ruben Alarcon presented for a  Medicare AWV and comprehensive Health Risk Assessment today. The following components were reviewed and updated:    Medical history  Family History  Social history  Allergies and Current Medications  Health Risk Assessment  Health Maintenance  Care Team         ** See Completed Assessments for Annual Wellness Visit within the encounter summary.**         The following assessments were completed:  Living Situation  CAGE  Depression Screening  Timed Get Up and Go  Whisper Test  Cognitive Function Screening  Nutrition Screening  ADL Screening  PAQ Screening      Opioid documentation:      Patient does not have a current opioid prescription.        Vitals:    24 1003 24 1022   BP: (!) 152/98 (!) 166/100   BP Location: Right arm    Patient Position: Sitting    Pulse: 66    Resp: 16    Temp: 97.8 °F (36.6 °C)    TempSrc: Oral    SpO2: 97%    Weight: 69.9 kg (154 lb)    Height: 5' 5" (1.651 m)      Body mass index is 25.63 kg/m².  Physical Exam  Vitals and nursing note reviewed.   Constitutional:       General: He is not in acute distress.     Appearance: Normal appearance. He is not ill-appearing.   HENT:      Head: Normocephalic.   Eyes:      Conjunctiva/sclera: Conjunctivae normal.   Cardiovascular:      Rate and Rhythm: Normal rate and regular rhythm.      Heart sounds: Normal heart sounds.   Pulmonary:      Effort: Pulmonary effort is normal. No respiratory distress.      Breath sounds: Normal breath sounds.   Musculoskeletal:      Cervical back: Neck supple.   Skin:     General: Skin is warm and dry.   Neurological:      Mental Status: He is alert and oriented to person, place, and time.               Diagnoses and health risks identified today and associated recommendations/orders:    1. Encounter for " subsequent annual wellness visit (AWV) in Medicare patient    2. Primary hypertension  Assessment & Plan:  Not controlled today but has not taken his medication and reports got upset about something earlier.  BP was 132/88 yesterday at his cardiology appointment and no medication changes were made.      3. Paroxysmal atrial fibrillation  Comments:  Stable, on Eliquis  Followed by Cardiology.  Overview:  On Eliquis for CVA prophylaxis        4. Malignant neoplasm of prostate  Overview:  July 2023 diagnosed per Dr. Filiberto Garcia.  Completed radiation 10/19/2023.    Assessment & Plan:  Stable, followed by Urology.      5. Malignant neoplasm of overlapping sites of bladder  Overview:  Per records from [facility] received 04/10/2023:  Right posterior bladder mass on MRI prostate 06/14/2023.      CT urogram obtain 06/21/2023 with 1.6 cm polypoid intraluminal filling defect along the posterior lateral urinary bladder wall highly suspicious for transitional cell carcinoma.    Cystoscopy on 06/22/2023 showed a 3 cm papillary right bladder wall lesion.    TURBT > 5 cm performed 06/27/2023.  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    Assessment & Plan:  F/u note 11/27/2023 per Dr. Filiberto Garcia, patient has done well post resection.  4-mth f/u surveillance cystoscopy with 4 cm prostatic urethra were bladder trabeculations and inflammatory changes over surgical site but no new bladder mass found.  Plan for 3-mth f/u cystoscopy surveillance.           Provided Ruben with a 5-10 year written screening schedule and personal prevention plan. Recommendations were developed using the USPSTF age appropriate recommendations. Education, counseling, and referrals were provided as needed. After Visit Summary printed and given to patient which includes a list of additional screenings\tests needed.    Follow up in about 1 year (around 3/28/2025).  Schedule regular office visit with me one-mth  for uncontrolled HTN with fasting labs planned.    Juli Zapata, JUANP    Covid vaccine - declined , Tetanus vaccine- declined , Shingles vaccine -  declined , RSV vaccine - VIS (10/19/2023) given with instructions to take at pharmacy, Influenza vaccine -declined, Lipid panel - NICOLASA faxed.    I offered to discuss advanced care planning, including how to pick a person who would make decisions for you if you were unable to make them for yourself, called a health care power of , and what kind of decisions you might make such as use of life sustaining treatments such as ventilators and tube feeding when faced with a life limiting illness recorded on a living will that they will need to know. (How you want to be cared for as you near the end of your natural life)     X Patient is interested in learning more about how to make advanced directives.  I provided them paperwork and offered to discuss this with them.

## 2024-03-27 ENCOUNTER — OFFICE VISIT (OUTPATIENT)
Dept: CARDIOLOGY | Facility: CLINIC | Age: 79
End: 2024-03-27
Payer: MEDICARE

## 2024-03-27 VITALS
SYSTOLIC BLOOD PRESSURE: 132 MMHG | BODY MASS INDEX: 25.01 KG/M2 | OXYGEN SATURATION: 98 % | WEIGHT: 155.63 LBS | HEART RATE: 76 BPM | DIASTOLIC BLOOD PRESSURE: 88 MMHG | HEIGHT: 66 IN

## 2024-03-27 DIAGNOSIS — I48.91 ATRIAL FIBRILLATION, UNSPECIFIED TYPE: Primary | ICD-10-CM

## 2024-03-27 DIAGNOSIS — I10 PRIMARY HYPERTENSION: Chronic | ICD-10-CM

## 2024-03-27 DIAGNOSIS — E78.5 HYPERLIPIDEMIA, UNSPECIFIED HYPERLIPIDEMIA TYPE: ICD-10-CM

## 2024-03-27 PROCEDURE — 99214 OFFICE O/P EST MOD 30 MIN: CPT | Mod: S$PBB,,, | Performed by: NURSE PRACTITIONER

## 2024-03-27 PROCEDURE — 99214 OFFICE O/P EST MOD 30 MIN: CPT | Mod: PBBFAC | Performed by: NURSE PRACTITIONER

## 2024-03-27 PROCEDURE — 93005 ELECTROCARDIOGRAM TRACING: CPT | Mod: PBBFAC | Performed by: INTERNAL MEDICINE

## 2024-03-27 PROCEDURE — 93010 ELECTROCARDIOGRAM REPORT: CPT | Mod: S$PBB,,, | Performed by: INTERNAL MEDICINE

## 2024-03-27 NOTE — ASSESSMENT & PLAN NOTE
Lab Results   Component Value Date    CHOL 182 03/02/2023     Lab Results   Component Value Date    HDL 47 03/02/2023     Lab Results   Component Value Date    LDLCALC 119 03/02/2023     Lab Results   Component Value Date    TRIG 82 03/02/2023       Lab Results   Component Value Date    CHOLHDL 3.9 03/02/2023     Lipids followed by PCP.

## 2024-03-27 NOTE — PROGRESS NOTES
PCP: Juli Zapata FNP    Referring Provider:     Subjective:   Ruben Alarcon is a 78 y.o. male with hx of atrial fibrillation s/p ablation by Dr. Avery, HTN,  and upper GI bleed,  who presents for routine follow up. He states that he is doing well and denies complaints.     9/27/2023 presents for routine follow up. He denies cardiac complaints.   The patient has recently been diagnosed with cancer and is having radiation therapy. Dr. Avery has recommended postponing the Watchman implant until after his radiation therapy has been completed.   3/27/2023 presents for follow up to discuss options for therapy after recent upper GI bleeding. He is now back on his Eliquis for CVA prophylaxis but would like to be referred to Dr. Avery again to be considered for the Watchman device.         Fhx:  Family History   Problem Relation Age of Onset    Hypertension Mother     Hypertension Father     Cancer Sister     Leukemia Sister     Cancer Brother     Prostate cancer Brother     Cancer Brother     Bladder Cancer Brother      Shx:   Social History     Socioeconomic History    Marital status:    Tobacco Use    Smoking status: Never     Passive exposure: Never    Smokeless tobacco: Never   Substance and Sexual Activity    Alcohol use: Never    Drug use: Never    Sexual activity: Yes     Partners: Female     Social Determinants of Health     Financial Resource Strain: Low Risk  (2/22/2023)    Overall Financial Resource Strain (CARDIA)     Difficulty of Paying Living Expenses: Not hard at all   Food Insecurity: No Food Insecurity (2/22/2023)    Hunger Vital Sign     Worried About Running Out of Food in the Last Year: Never true     Ran Out of Food in the Last Year: Never true   Transportation Needs: No Transportation Needs (2/22/2023)    PRAPARE - Transportation     Lack of Transportation (Medical): No     Lack of Transportation (Non-Medical): No   Physical Activity: Inactive (2/22/2023)    Exercise Vital Sign      Days of Exercise per Week: 0 days     Minutes of Exercise per Session: 0 min   Stress: No Stress Concern Present (2/22/2023)    Citizen of [place] [employer] Occupational [employer] - Occupational Stress Questionnaire     Feeling of Stress : Not at all   Social Connections: Socially Integrated (2/22/2023)    Social Connection and Isolation Panel [NHANES]     Frequency of Communication with Friends and Family: More than three times a week     Frequency of Social Gatherings with Friends and Family: More than three times a week     Attends [employer] Services: More than 4 times per year     Active Member of Clubs or Organizations: Yes     Attends Club or Organization Meetings: More than 4 times per year     Marital Status:    Housing Stability: Low Risk  (2/22/2023)    Housing Stability Vital Sign     Unable to Pay for Housing in the Last Year: No     Number of Places Lived in the Last Year: 1     Unstable Housing in the Last Year: No       EKG 3/27/2024 RSR with marked sinus arrhythmia; HR 60 bpm; minimal voltage criteria for LVH; NSTWA  Results for orders placed or performed in visit on 09/27/23   EKG 12-lead    Collection Time: 09/27/23  9:45 AM    Narrative    Test Reason : I10,    Vent. Rate : 053 BPM     Atrial Rate : 053 BPM     P-R Int : 168 ms          QRS Dur : 102 ms      QT Int : 452 ms       P-R-T Axes : 058 -21 -18 degrees     QTc Int : 424 ms    Sinus bradycardia  Minimal voltage criteria for LVH, may be normal variant ( R in aVL )  Nonspecific T wave abnormality  Abnormal ECG  When compared with ECG of 21-FEB-2023 20:36,  PREVIOUS ECG IS PRESENT  Confirmed by Ruben Ma MD ([id number]) on 9/28/2023 9:02:09 AM    Referred By:             Confirmed By:Ruben Ma MD            Lab Results   Component Value Date     03/02/2023    K 4.5 03/02/2023     (H) 03/02/2023    CO2 25 03/02/2023    BUN 15 03/02/2023    CREATININE 0.96 03/02/2023    CALCIUM 8.5 03/02/2023    ANIONGAP 9 03/02/2023  "      Lab Results   Component Value Date    CHOL 182 03/02/2023     Lab Results   Component Value Date    HDL 47 03/02/2023     Lab Results   Component Value Date    LDLCALC 119 03/02/2023     Lab Results   Component Value Date    TRIG 82 03/02/2023     Lab Results   Component Value Date    CHOLHDL 3.9 03/02/2023       Lab Results   Component Value Date    WBC 5.63 03/02/2023    HGB 9.6 (L) 03/02/2023    HCT 29.2 (L) 03/02/2023    MCV 91.8 03/02/2023     03/02/2023           Current Outpatient Medications:     amLODIPine (NORVASC) 5 MG tablet, Take 1 tablet (5 mg total) by mouth once daily., Disp: 90 tablet, Rfl: 3    apixaban (ELIQUIS) 5 mg Tab, Take 1 tablet (5 mg total) by mouth 2 (two) times daily., Disp: 180 tablet, Rfl: 3    carvediloL (COREG) 12.5 MG tablet, Take 1 tablet (12.5 mg total) by mouth 2 (two) times daily., Disp: 180 tablet, Rfl: 1    cholecalciferol, vitamin D3, (VITAMIN D3) 50 mcg (2,000 unit) Cap capsule, Take by mouth once daily., Disp: , Rfl:     omega-3 fatty acids/fish oil (FISH OIL-OMEGA-3 FATTY ACIDS) 300-1,000 mg capsule, Take 1 capsule by mouth once daily., Disp: , Rfl:     pantoprazole (PROTONIX) 40 MG tablet, TAKE 1 TABLET DAILY, Disp: 90 tablet, Rfl: 3    calcium carbonate (OS-NEREIDA) 600 mg calcium (1,500 mg) Tab, Take 600 mg by mouth once daily., Disp: , Rfl:   Meds reviewed and reconciled.      Review of Systems   Respiratory:  Negative for shortness of breath.    Cardiovascular:  Negative for chest pain, palpitations, orthopnea, claudication, leg swelling and PND.   Neurological:  Negative for dizziness, loss of consciousness and weakness.           Objective:   /88 (BP Location: Left arm, Patient Position: Sitting)   Pulse 76   Ht 5' 6" (1.676 m)   Wt 70.6 kg (155 lb 9.6 oz)   SpO2 98%   BMI 25.11 kg/m²     Physical Exam  Vitals and nursing note reviewed.   Constitutional:       Appearance: Normal appearance. He is normal weight.   HENT:      Head: Normocephalic " and atraumatic.   Neck:      Vascular: No carotid bruit.   Cardiovascular:      Rate and Rhythm: Normal rate and regular rhythm.      Pulses: Normal pulses.      Heart sounds: Normal heart sounds.   Pulmonary:      Effort: Pulmonary effort is normal.      Breath sounds: Normal breath sounds.   Abdominal:      Palpations: Abdomen is soft.   Musculoskeletal:      Cervical back: Neck supple.      Right lower leg: No edema.      Left lower leg: No edema.   Skin:     General: Skin is warm and dry.      Capillary Refill: Capillary refill takes less than 2 seconds.   Neurological:      Mental Status: He is alert.           Assessment:     1. Atrial fibrillation, unspecified type  EKG 12-lead    EKG 12-lead      2. Primary hypertension        3. Hyperlipidemia, unspecified hyperlipidemia type              Plan:   Primary hypertension  132/88 mmHg    Hyperlipidemia  Lab Results   Component Value Date    CHOL 182 03/02/2023     Lab Results   Component Value Date    HDL 47 03/02/2023     Lab Results   Component Value Date    LDLCALC 119 03/02/2023     Lab Results   Component Value Date    TRIG 82 03/02/2023       Lab Results   Component Value Date    CHOLHDL 3.9 03/02/2023     Lipids followed by PCP.    Atrial fibrillation  Patient has recently been diagnosed with cancer and is having radiation therapy. Dr. Avery has recommended postponing the Watchman implant until after his radiation therapy has been completed.        RTC: 6 months

## 2024-03-28 ENCOUNTER — OFFICE VISIT (OUTPATIENT)
Dept: FAMILY MEDICINE | Facility: CLINIC | Age: 79
End: 2024-03-28
Payer: MEDICARE

## 2024-03-28 VITALS
TEMPERATURE: 98 F | SYSTOLIC BLOOD PRESSURE: 166 MMHG | RESPIRATION RATE: 16 BRPM | WEIGHT: 154 LBS | BODY MASS INDEX: 25.66 KG/M2 | OXYGEN SATURATION: 97 % | HEART RATE: 66 BPM | DIASTOLIC BLOOD PRESSURE: 100 MMHG | HEIGHT: 65 IN

## 2024-03-28 DIAGNOSIS — C61 MALIGNANT NEOPLASM OF PROSTATE: Chronic | ICD-10-CM

## 2024-03-28 DIAGNOSIS — C67.8 MALIGNANT NEOPLASM OF OVERLAPPING SITES OF BLADDER: ICD-10-CM

## 2024-03-28 DIAGNOSIS — I10 PRIMARY HYPERTENSION: Chronic | ICD-10-CM

## 2024-03-28 DIAGNOSIS — I48.0 PAROXYSMAL ATRIAL FIBRILLATION: Chronic | ICD-10-CM

## 2024-03-28 DIAGNOSIS — Z00.00 ENCOUNTER FOR SUBSEQUENT ANNUAL WELLNESS VISIT (AWV) IN MEDICARE PATIENT: Primary | ICD-10-CM

## 2024-03-28 PROBLEM — D62 ACUTE BLOOD LOSS ANEMIA: Status: RESOLVED | Noted: 2023-02-21 | Resolved: 2024-03-28

## 2024-03-28 PROBLEM — Z12.11 COLON CANCER SCREENING: Status: RESOLVED | Noted: 2023-11-30 | Resolved: 2024-03-28

## 2024-03-28 LAB
OHS QRS DURATION: 100 MS
OHS QTC CALCULATION: 450 MS

## 2024-03-28 PROCEDURE — G0439 PPPS, SUBSEQ VISIT: HCPCS | Mod: ,,, | Performed by: NURSE PRACTITIONER

## 2024-03-28 NOTE — PATIENT INSTRUCTIONS
Counseling and Referral of Other Preventative  (Italic type indicates deductible and co-insurance are waived)    Patient Name: Ruben Alarcon  Today's Date: 3/28/2024    Health Maintenance       Date Due Completion Date    Shingles Vaccine (1 of 2) Never done ---    RSV Vaccine (Age 60+ and Pregnant patients) (1 - 1-dose 60+ series) Never done ---    TETANUS VACCINE 02/01/2013 2/1/2003    Influenza Vaccine (1) Never done ---    Lipid Panel 03/02/2028 3/2/2023        No orders of the defined types were placed in this encounter.      The following information is provided to all patients.  This information is to help you find resources for any of the problems found today that may be affecting your health:                  Living healthy guide: ms.gov    Understanding Diabetes: www.diabetes.org      Eating healthy: www.cdc.gov/healthyweight      CDC home safety checklist: www.cdc.gov/steadi/patient.html      Agency on Aging: ms.gov    Alcoholics anonymous (AA): www.aa.org      Physical Activity: www.alec.nih.gov/tp6uagv      Tobacco use: ms.gov

## 2024-03-28 NOTE — LETTER
AUTHORIZATION FOR RELEASE OF   CONFIDENTIAL INFORMATION    Dear [facility],    We are seeing Ruben Alarcon, date of birth 1945, in the clinic at [facility]. Juli Zapata FNP is the patient's PCP. Ruben Alarcon has an outstanding lab/procedure at the time we reviewed his chart. In order to help keep his health information updated, he has authorized us to request the following medical record(s):        (  )  MAMMOGRAM                                      (  )  COLONOSCOPY      (  )  PAP SMEAR                                          (  x)  OUTSIDE LAB RESULTS     (  )  DEXA SCAN                                          (  )  EYE EXAM            (  )  FOOT EXAM                                          (  )  ENTIRE RECORD     (  )  OUTSIDE IMMUNIZATIONS                 (  )  _______________         Please fax records to [facility], Lafferty, Jennifer P, FNP, [phone]     If you have any questions, please contact  at [phone].           Patient Name: Ruben Alarcon  : 1945  Patient Phone #: [phone]

## 2024-03-28 NOTE — ASSESSMENT & PLAN NOTE
Not controlled today but has not taken his medication and reports got upset about something earlier.  BP was 132/88 yesterday at his cardiology appointment and no medication changes were made.

## 2024-04-10 PROBLEM — C67.8 MALIGNANT NEOPLASM OF OVERLAPPING SITES OF BLADDER: Status: ACTIVE | Noted: 2024-04-10

## 2024-04-10 NOTE — ASSESSMENT & PLAN NOTE
F/u note 11/27/2023 per Dr. Filiberto Garcia, patient has done well post resection.  4-mth f/u surveillance cystoscopy with 4 cm prostatic urethra were bladder trabeculations and inflammatory changes over surgical site but no new bladder mass found.  Plan for 3-mth f/u cystoscopy surveillance.

## 2024-05-01 ENCOUNTER — OFFICE VISIT (OUTPATIENT)
Dept: FAMILY MEDICINE | Facility: CLINIC | Age: 79
End: 2024-05-01
Payer: MEDICARE

## 2024-05-01 VITALS
HEIGHT: 65 IN | HEART RATE: 79 BPM | WEIGHT: 152.19 LBS | DIASTOLIC BLOOD PRESSURE: 75 MMHG | TEMPERATURE: 99 F | BODY MASS INDEX: 25.36 KG/M2 | RESPIRATION RATE: 20 BRPM | OXYGEN SATURATION: 97 % | SYSTOLIC BLOOD PRESSURE: 124 MMHG

## 2024-05-01 DIAGNOSIS — E78.5 HYPERLIPIDEMIA, UNSPECIFIED HYPERLIPIDEMIA TYPE: ICD-10-CM

## 2024-05-01 DIAGNOSIS — Z92.3 HISTORY OF RADIATION THERAPY: ICD-10-CM

## 2024-05-01 DIAGNOSIS — D64.9 ANEMIA, UNSPECIFIED TYPE: ICD-10-CM

## 2024-05-01 DIAGNOSIS — Z79.899 ENCOUNTER FOR LONG-TERM (CURRENT) USE OF OTHER MEDICATIONS: ICD-10-CM

## 2024-05-01 DIAGNOSIS — I10 PRIMARY HYPERTENSION: Primary | Chronic | ICD-10-CM

## 2024-05-01 DIAGNOSIS — I72.2 ANEURYSM OF RENAL ARTERY: ICD-10-CM

## 2024-05-01 LAB
25(OH)D3 SERPL-MCNC: 45.2 NG/ML
ALBUMIN SERPL BCP-MCNC: 3.8 G/DL (ref 3.5–5)
ALBUMIN/GLOB SERPL: 1.3 {RATIO}
ALP SERPL-CCNC: 64 U/L (ref 45–115)
ALT SERPL W P-5'-P-CCNC: 22 U/L (ref 16–61)
ANION GAP SERPL CALCULATED.3IONS-SCNC: 11 MMOL/L (ref 7–16)
AST SERPL W P-5'-P-CCNC: 19 U/L (ref 15–37)
BASOPHILS # BLD AUTO: 0.03 K/UL (ref 0–0.2)
BASOPHILS NFR BLD AUTO: 0.6 % (ref 0–1)
BILIRUB SERPL-MCNC: 0.5 MG/DL (ref ?–1.2)
BUN SERPL-MCNC: 18 MG/DL (ref 7–18)
BUN/CREAT SERPL: 16 (ref 6–20)
CALCIUM SERPL-MCNC: 9.5 MG/DL (ref 8.5–10.1)
CHLORIDE SERPL-SCNC: 112 MMOL/L (ref 98–107)
CHOLEST SERPL-MCNC: 200 MG/DL (ref 0–200)
CHOLEST/HDLC SERPL: 4 {RATIO}
CO2 SERPL-SCNC: 25 MMOL/L (ref 21–32)
CREAT SERPL-MCNC: 1.13 MG/DL (ref 0.7–1.3)
DIFFERENTIAL METHOD BLD: ABNORMAL
EGFR (NO RACE VARIABLE) (RUSH/TITUS): 67 ML/MIN/1.73M2
EOSINOPHIL # BLD AUTO: 0.1 K/UL (ref 0–0.5)
EOSINOPHIL NFR BLD AUTO: 2.1 % (ref 1–4)
ERYTHROCYTE [DISTWIDTH] IN BLOOD BY AUTOMATED COUNT: 12.9 % (ref 11.5–14.5)
EST. AVERAGE GLUCOSE BLD GHB EST-MCNC: 117 MG/DL
FERRITIN SERPL-MCNC: 46 NG/ML (ref 26–388)
FOLATE SERPL-MCNC: >20 NG/ML (ref 3.1–17.5)
GLOBULIN SER-MCNC: 3 G/DL (ref 2–4)
GLUCOSE SERPL-MCNC: 95 MG/DL (ref 74–106)
HBA1C MFR BLD HPLC: 5.7 % (ref 4.5–6.6)
HCT VFR BLD AUTO: 36.2 % (ref 40–54)
HDLC SERPL-MCNC: 50 MG/DL (ref 40–60)
HGB BLD-MCNC: 12.2 G/DL (ref 13.5–18)
IMM GRANULOCYTES # BLD AUTO: 0.01 K/UL (ref 0–0.04)
IMM GRANULOCYTES NFR BLD: 0.2 % (ref 0–0.4)
IRON SATN MFR SERPL: 30 % (ref 14–50)
IRON SERPL-MCNC: 88 ΜG/DL (ref 65–175)
LDLC SERPL CALC-MCNC: 130 MG/DL
LDLC/HDLC SERPL: 2.6 {RATIO}
LYMPHOCYTES # BLD AUTO: 1.37 K/UL (ref 1–4.8)
LYMPHOCYTES NFR BLD AUTO: 29.3 % (ref 27–41)
MCH RBC QN AUTO: 30.2 PG (ref 27–31)
MCHC RBC AUTO-ENTMCNC: 33.7 G/DL (ref 32–36)
MCV RBC AUTO: 89.6 FL (ref 80–96)
MONOCYTES # BLD AUTO: 0.46 K/UL (ref 0–0.8)
MONOCYTES NFR BLD AUTO: 9.9 % (ref 2–6)
MPC BLD CALC-MCNC: 10.9 FL (ref 9.4–12.4)
NEUTROPHILS # BLD AUTO: 2.7 K/UL (ref 1.8–7.7)
NEUTROPHILS NFR BLD AUTO: 57.9 % (ref 53–65)
NONHDLC SERPL-MCNC: 150 MG/DL
NRBC # BLD AUTO: 0 X10E3/UL
NRBC, AUTO (.00): 0 %
PLATELET # BLD AUTO: 202 K/UL (ref 150–400)
POTASSIUM SERPL-SCNC: 4 MMOL/L (ref 3.5–5.1)
PROT SERPL-MCNC: 6.8 G/DL (ref 6.4–8.2)
RBC # BLD AUTO: 4.04 M/UL (ref 4.6–6.2)
SODIUM SERPL-SCNC: 144 MMOL/L (ref 136–145)
TIBC SERPL-MCNC: 296 ΜG/DL (ref 250–450)
TRIGL SERPL-MCNC: 101 MG/DL (ref 35–150)
TSH SERPL DL<=0.005 MIU/L-ACNC: 1.87 UIU/ML (ref 0.36–3.74)
VIT B12 SERPL-MCNC: 334 PG/ML (ref 193–986)
VLDLC SERPL-MCNC: 20 MG/DL
WBC # BLD AUTO: 4.67 K/UL (ref 4.5–11)

## 2024-05-01 PROCEDURE — 82728 ASSAY OF FERRITIN: CPT | Mod: ,,, | Performed by: CLINICAL MEDICAL LABORATORY

## 2024-05-01 PROCEDURE — 82746 ASSAY OF FOLIC ACID SERUM: CPT | Mod: ,,, | Performed by: CLINICAL MEDICAL LABORATORY

## 2024-05-01 PROCEDURE — 82306 VITAMIN D 25 HYDROXY: CPT | Mod: ,,, | Performed by: CLINICAL MEDICAL LABORATORY

## 2024-05-01 PROCEDURE — 84443 ASSAY THYROID STIM HORMONE: CPT | Mod: ,,, | Performed by: CLINICAL MEDICAL LABORATORY

## 2024-05-01 PROCEDURE — 80061 LIPID PANEL: CPT | Mod: ,,, | Performed by: CLINICAL MEDICAL LABORATORY

## 2024-05-01 PROCEDURE — 83036 HEMOGLOBIN GLYCOSYLATED A1C: CPT | Mod: ,,, | Performed by: CLINICAL MEDICAL LABORATORY

## 2024-05-01 PROCEDURE — 83540 ASSAY OF IRON: CPT | Mod: ,,, | Performed by: CLINICAL MEDICAL LABORATORY

## 2024-05-01 PROCEDURE — 99214 OFFICE O/P EST MOD 30 MIN: CPT | Mod: ,,, | Performed by: NURSE PRACTITIONER

## 2024-05-01 PROCEDURE — 82607 VITAMIN B-12: CPT | Mod: ,,, | Performed by: CLINICAL MEDICAL LABORATORY

## 2024-05-01 PROCEDURE — 83550 IRON BINDING TEST: CPT | Mod: ,,, | Performed by: CLINICAL MEDICAL LABORATORY

## 2024-05-01 PROCEDURE — 85025 COMPLETE CBC W/AUTO DIFF WBC: CPT | Mod: ,,, | Performed by: CLINICAL MEDICAL LABORATORY

## 2024-05-01 PROCEDURE — 80053 COMPREHEN METABOLIC PANEL: CPT | Mod: ,,, | Performed by: CLINICAL MEDICAL LABORATORY

## 2024-05-01 NOTE — PROGRESS NOTES
"   [facility]       PATIENT NAME: Ruben Alarcon   : 1945    AGE: 78 y.o. DATE OF ENCOUNTER: 24    MRN: [id number]      PCP: Juli Zapata FNP    Reason for Visit / Chief Complaint:     274}  Chief Complaint   Patient presents with    Follow-up     Pt presents to the clinic for 1m f/u    Hyperlipidemia    Hypertension       Subjective:     HPI:    Presents for f/u HTN & HLD.  Is fasting for labs.  Hx NICKI.    States "wants to have minerals and vitamins tested" because he was told radiation could affect these levels.  Radiation ended 10/19/24.    Had Watchman procedure 2024 and is now off Eliquis.    Review of Systems:     Review of Systems   Constitutional: Negative.    HENT: Negative.     Eyes: Negative.    Respiratory: Negative.     Cardiovascular: Negative.    Gastrointestinal: Negative.  Negative for blood in stool.   Endocrine: Negative.    Genitourinary: Negative.    Musculoskeletal: Negative.    Skin: Negative.    Allergic/Immunologic: Negative.    Neurological: Negative.    Hematological: Negative.    Psychiatric/Behavioral: Negative.         Allergies and Meds: 274}     Review of patient's allergies indicates:  No Known Allergies     Current Outpatient Medications   Medication Sig Dispense Refill    amLODIPine (NORVASC) 5 MG tablet Take 1 tablet (5 mg total) by mouth once daily. 90 tablet 3    carvediloL (COREG) 12.5 MG tablet Take 1 tablet (12.5 mg total) by mouth 2 (two) times daily. 180 tablet 1    cholecalciferol, vitamin D3, (VITAMIN D3) 50 mcg (2,000 unit) Cap capsule Take by mouth once daily.      omega-3 fatty acids/fish oil (FISH OIL-OMEGA-3 FATTY ACIDS) 300-1,000 mg capsule Take 1 capsule by mouth once daily.      pantoprazole (PROTONIX) 40 MG tablet TAKE 1 TABLET DAILY 90 tablet 3     No current facility-administered medications for this visit.       Labs:274}   I have reviewed labs below:    Lab Results   Component Value Date    WBC 5.63 " "03/02/2023    RBC 3.18 (L) 03/02/2023    HGB 9.6 (L) 03/02/2023    HCT 29.2 (L) 03/02/2023     03/02/2023     03/02/2023    K 4.5 03/02/2023     (H) 03/02/2023    CALCIUM 8.5 03/02/2023    GLU 94 03/02/2023    BUN 15 03/02/2023    CREATININE 0.96 03/02/2023    ALT 16 03/02/2023    AST 17 03/02/2023    INR 1.10 02/21/2023    CHOL 182 03/02/2023    TRIG 82 03/02/2023    HDL 47 03/02/2023    LDLCALC 119 03/02/2023    TSH 2.390 03/02/2023    PSA 10.400 (H) 03/02/2023       Medical History: 274}     Past Medical History:   Diagnosis Date    Atrial fibrillation     Bradycardia     Cancer     prostate    COVID-19 11/01/2021    Hiatal hernia with gastroesophageal reflux disease and esophagitis     Hyperlipidemia     Hypertension     Valvular heart disease     Type: TR    White coat syndrome with diagnosis of hypertension       Social History     Tobacco Use   Smoking Status Never    Passive exposure: Never   Smokeless Tobacco Never      Past Surgical History:   Procedure Laterality Date    ABLATION      Dr. Mazariegos at [facility]    EYE SURGERY Bilateral     cataract removal    SHOULDER SURGERY Left     TONSILLECTOMY          Health Maintenance: {      Health Maintenance         Date Due Completion Date    Shingles Vaccine (1 of 2) Never done ---    RSV Vaccine (Age 60+ and Pregnant patients) (1 - 1-dose 60+ series) Never done ---    TETANUS VACCINE 02/01/2013 2/1/2003    Lipid Panel 05/01/2029 5/1/2024          Immunization History   Administered Date(s) Administered    Hepatitis A, Adult 10/26/1998, 05/22/1999    Meningococcal Polysaccharide (23-Valent) (MPSV4) 10/26/1998    Pneumococcal Conjugate - 20 Valent 03/02/2023    Td (ADULT) 01/01/1993, 01/09/1993, 02/01/2003       Objective:  274}   Vital Signs  Temp: 98.7 °F (37.1 °C)  Temp Source: Oral  Pulse: 79  Resp: 20  SpO2: 97 %  BP: 124/75  BP Location: Right arm  Patient Position: Sitting  Pain Score: 0-No pain  Height and Weight  Height: 5' 5" (165.1 " cm)  Weight: 69 kg (152 lb 3.2 oz)  BSA (Calculated - sq m): 1.78 sq meters  BMI (Calculated): 25.3  Weight in (lb) to have BMI = 25: 149.9    Over the last two weeks how often have you been bothered by little interest or pleasure in doing things: 0  Over the last two weeks how often have you been bothered by feeling down, depressed or hopeless: 0  PHQ-2 Total Score: 0  PHQ-9 Score: 0  PHQ-9 Interpretation: Minimal or None    Wt Readings from Last 3 Encounters:   05/01/24 69 kg (152 lb 3.2 oz)   03/28/24 69.9 kg (154 lb)   03/27/24 70.6 kg (155 lb 9.6 oz)     Physical Exam  Vitals and nursing note reviewed.   Constitutional:       General: He is not in acute distress.     Appearance: Normal appearance. He is not ill-appearing.   HENT:      Head: Normocephalic.      Right Ear: Tympanic membrane, ear canal and external ear normal.      Left Ear: Tympanic membrane, ear canal and external ear normal.      Nose: Nose normal.      Mouth/Throat:      Mouth: Mucous membranes are moist.      Pharynx: Oropharynx is clear. Uvula midline. No posterior oropharyngeal erythema or uvula swelling.   Eyes:      Conjunctiva/sclera: Conjunctivae normal.      Pupils: Pupils are equal, round, and reactive to light.   Neck:      Thyroid: No thyromegaly.      Vascular: Normal carotid pulses. No carotid bruit.   Cardiovascular:      Rate and Rhythm: Normal rate and regular rhythm.      Pulses: Normal pulses.      Heart sounds: Normal heart sounds.   Pulmonary:      Effort: Pulmonary effort is normal. No respiratory distress.      Breath sounds: Normal breath sounds. No wheezing, rhonchi or rales.   Abdominal:      Palpations: Abdomen is soft.      Tenderness: There is no abdominal tenderness.   Musculoskeletal:      Cervical back: Neck supple.      Right lower leg: No edema.      Left lower leg: No edema.   Lymphadenopathy:      Cervical: No cervical adenopathy.   Skin:     General: Skin is warm and dry.      Capillary Refill: Capillary  refill takes less than 2 seconds.      Coloration: Skin is not jaundiced or pale.   Neurological:      General: No focal deficit present.      Mental Status: He is alert and oriented to person, place, and time.      Gait: Gait normal.   Psychiatric:         Mood and Affect: Mood normal.         Behavior: Behavior normal.          Assessment and Plan: 274}     1. Primary hypertension  Assessment & Plan:  Improved since last visit  Continue current treatment plan w/ amlodipine and carvedilol.    Orders:  -     Comprehensive Metabolic Panel; Future; Expected date: 05/01/2024  -     Lipid Panel; Future; Expected date: 05/01/2024    2. Encounter for long-term (current) use of other medications  -     Vitamin B12 & Folate; Future; Expected date: 05/01/2024  -     Comprehensive Metabolic Panel; Future; Expected date: 05/01/2024  -     TSH; Future; Expected date: 05/01/2024  -     Hemoglobin A1C; Future; Expected date: 05/01/2024  -     Vitamin D; Future; Expected date: 05/01/2024    3. Hyperlipidemia, unspecified hyperlipidemia type  Assessment & Plan:  Update fasting lipids.    Orders:  -     Comprehensive Metabolic Panel; Future; Expected date: 05/01/2024  -     Lipid Panel; Future; Expected date: 05/01/2024    4. History of radiation therapy  -     Vitamin D; Future; Expected date: 05/01/2024    5. Anemia, unspecified type  -     Iron and TIBC; Future; Expected date: 05/01/2024  -     Ferritin; Future; Expected date: 05/01/2024  -     Vitamin B12 & Folate; Future; Expected date: 05/01/2024  -     CBC Auto Differential; Future; Expected date: 05/01/2024    6. Aneurysm of renal artery  Assessment & Plan:  Per Urology Dr. Filiberto Garcia's note 8/02/23.   Continue f/u with Uro.        Follow up for hypertension, with fasting labs; and f/u as needed.    Signature:  RUKHSANA Gabriel    Future Appointments   Date Time Provider Department Center   10/1/2024  8:30 AM [facility]   4/3/2025   1:00 PM AWV NURSE, [facility]   5/5/2025  9:40 AM Juli Zapata FNP [facility]

## 2024-05-05 NOTE — PROGRESS NOTES
Check w/ pt about my recommendation to start rosuvastatin 10 mg.  Trying lifestyle changes isn't an option at this stage because he really should have already been on a low dose statin.  Unless he refuses med, I will send it and he needs a 3 mth f/u HLD appt.

## 2024-05-06 DIAGNOSIS — E78.00 HYPERCHOLESTEREMIA: Primary | Chronic | ICD-10-CM

## 2024-05-06 RX ORDER — ROSUVASTATIN CALCIUM 10 MG/1
10 TABLET, COATED ORAL DAILY
Qty: 90 TABLET | Refills: 3 | Status: SHIPPED | OUTPATIENT
Start: 2024-05-06 | End: 2025-05-06

## 2024-07-09 DIAGNOSIS — Z71.89 COMPLEX CARE COORDINATION: ICD-10-CM

## 2024-07-16 ENCOUNTER — HOSPITAL ENCOUNTER (INPATIENT)
Facility: HOSPITAL | Age: 79
LOS: 1 days | Discharge: HOME | DRG: 309 | End: 2024-07-17
Attending: EMERGENCY MEDICINE | Admitting: HOSPITALIST
Payer: MEDICARE

## 2024-07-16 DIAGNOSIS — I07.1 TRICUSPID VALVE INSUFFICIENCY, UNSPECIFIED ETIOLOGY: ICD-10-CM

## 2024-07-16 DIAGNOSIS — I48.91 ATRIAL FIBRILLATION WITH RVR: Primary | ICD-10-CM

## 2024-07-16 DIAGNOSIS — I10 PRIMARY HYPERTENSION: Chronic | ICD-10-CM

## 2024-07-16 DIAGNOSIS — I48.91 ATRIAL FIBRILLATION WITH RAPID VENTRICULAR RESPONSE: ICD-10-CM

## 2024-07-16 DIAGNOSIS — K20.80 LOS ANGELES GRADE C ESOPHAGITIS: ICD-10-CM

## 2024-07-16 DIAGNOSIS — I48.91 ATRIAL FIBRILLATION: ICD-10-CM

## 2024-07-16 DIAGNOSIS — I49.9 CARDIAC RHYTHM DISORDER OR DISTURBANCE OR CHANGE: ICD-10-CM

## 2024-07-16 DIAGNOSIS — I48.0 PAROXYSMAL ATRIAL FIBRILLATION: ICD-10-CM

## 2024-07-16 DIAGNOSIS — I50.42 CHRONIC COMBINED SYSTOLIC AND DIASTOLIC HEART FAILURE: ICD-10-CM

## 2024-07-16 DIAGNOSIS — E78.5 HYPERLIPIDEMIA, UNSPECIFIED HYPERLIPIDEMIA TYPE: ICD-10-CM

## 2024-07-16 DIAGNOSIS — R07.9 CHEST PAIN: ICD-10-CM

## 2024-07-16 LAB
ALBUMIN SERPL BCP-MCNC: 3.4 G/DL (ref 3.5–5)
ALBUMIN/GLOB SERPL: 1 {RATIO}
ALP SERPL-CCNC: 65 U/L (ref 45–115)
ALT SERPL W P-5'-P-CCNC: 26 U/L (ref 16–61)
ANION GAP SERPL CALCULATED.3IONS-SCNC: 10 MMOL/L (ref 7–16)
AORTIC ROOT ANNULUS: 3.37 CM
AORTIC VALVE CUSP SEPERATION: 1.81 CM
APICAL FOUR CHAMBER EJECTION FRACTION: 67 %
APTT PPP: 29.3 SECONDS (ref 25.2–37.3)
AST SERPL W P-5'-P-CCNC: 18 U/L (ref 15–37)
AV INDEX (PROSTH): 0.61
AV MEAN GRADIENT: 2 MMHG
AV PEAK GRADIENT: 4 MMHG
AV VALVE AREA BY VELOCITY RATIO: 2.19 CM²
AV VALVE AREA: 2.11 CM²
AV VELOCITY RATIO: 0.63
BASOPHILS # BLD AUTO: 0.04 K/UL (ref 0–0.2)
BASOPHILS NFR BLD AUTO: 0.5 % (ref 0–1)
BILIRUB SERPL-MCNC: 0.2 MG/DL (ref ?–1.2)
BSA FOR ECHO PROCEDURE: 1.81 M2
BUN SERPL-MCNC: 22 MG/DL (ref 7–18)
BUN/CREAT SERPL: 19 (ref 6–20)
CALCIUM SERPL-MCNC: 8.7 MG/DL (ref 8.5–10.1)
CHLORIDE SERPL-SCNC: 108 MMOL/L (ref 98–107)
CHOLEST SERPL-MCNC: 114 MG/DL (ref 0–200)
CHOLEST/HDLC SERPL: 3.2 {RATIO}
CO2 SERPL-SCNC: 26 MMOL/L (ref 21–32)
CREAT SERPL-MCNC: 1.16 MG/DL (ref 0.7–1.3)
CV ECHO LV RWT: 0.94 CM
DIFFERENTIAL METHOD BLD: ABNORMAL
DOP CALC AO PEAK VEL: 0.95 M/S
DOP CALC AO VTI: 17.2 CM
DOP CALC LVOT AREA: 3.5 CM2
DOP CALC LVOT DIAMETER: 2.1 CM
DOP CALC LVOT PEAK VEL: 0.6 M/S
DOP CALC LVOT STROKE VOLUME: 36.35 CM3
DOP CALCLVOT PEAK VEL VTI: 10.5 CM
ECHO LV POSTERIOR WALL: 1.7 CM (ref 0.6–1.1)
EGFR (NO RACE VARIABLE) (RUSH/TITUS): 64 ML/MIN/1.73M2
EJECTION FRACTION: 35 %
EOSINOPHIL # BLD AUTO: 0.31 K/UL (ref 0–0.5)
EOSINOPHIL NFR BLD AUTO: 4.1 % (ref 1–4)
ERYTHROCYTE [DISTWIDTH] IN BLOOD BY AUTOMATED COUNT: 13.4 % (ref 11.5–14.5)
EST. AVERAGE GLUCOSE BLD GHB EST-MCNC: 117 MG/DL
FRACTIONAL SHORTENING: 29 % (ref 28–44)
GLOBULIN SER-MCNC: 3.4 G/DL (ref 2–4)
GLUCOSE SERPL-MCNC: 131 MG/DL (ref 74–106)
HBA1C MFR BLD HPLC: 5.7 % (ref 4.5–6.6)
HCT VFR BLD AUTO: 35.2 % (ref 40–54)
HDLC SERPL-MCNC: 36 MG/DL (ref 40–60)
HGB BLD-MCNC: 11.5 G/DL (ref 13.5–18)
IMM GRANULOCYTES # BLD AUTO: 0.01 K/UL (ref 0–0.04)
IMM GRANULOCYTES NFR BLD: 0.1 % (ref 0–0.4)
INR BLD: 0.99
INTERVENTRICULAR SEPTUM: 1.36 CM (ref 0.6–1.1)
IVC DIAMETER: 0.59 CM
LDLC SERPL CALC-MCNC: 49 MG/DL
LDLC/HDLC SERPL: 1.4 {RATIO}
LEFT ATRIUM AREA SYSTOLIC (APICAL 4 CHAMBER): 15.88 CM2
LEFT ATRIUM SIZE: 3.92 CM
LEFT INTERNAL DIMENSION IN SYSTOLE: 2.54 CM (ref 2.1–4)
LEFT VENTRICLE DIASTOLIC VOLUME INDEX: 30.34 ML/M2
LEFT VENTRICLE DIASTOLIC VOLUME: 54.31 ML
LEFT VENTRICLE END DIASTOLIC VOLUME APICAL 4 CHAMBER: 38.67 ML
LEFT VENTRICLE END SYSTOLIC VOLUME APICAL 4 CHAMBER: 39.65 ML
LEFT VENTRICLE MASS INDEX: 116 G/M2
LEFT VENTRICLE SYSTOLIC VOLUME INDEX: 13 ML/M2
LEFT VENTRICLE SYSTOLIC VOLUME: 23.27 ML
LEFT VENTRICULAR INTERNAL DIMENSION IN DIASTOLE: 3.6 CM (ref 3.5–6)
LEFT VENTRICULAR MASS: 207.56 G
LVED V (TEICH): 54.31 ML
LVES V (TEICH): 23.27 ML
LVOT MG: 0.98 MMHG
LVOT MV: 0.48 CM/S
LYMPHOCYTES # BLD AUTO: 1.52 K/UL (ref 1–4.8)
LYMPHOCYTES NFR BLD AUTO: 20.2 % (ref 27–41)
MAGNESIUM SERPL-MCNC: 2.1 MG/DL (ref 1.7–2.3)
MCH RBC QN AUTO: 30.3 PG (ref 27–31)
MCHC RBC AUTO-ENTMCNC: 32.7 G/DL (ref 32–36)
MCV RBC AUTO: 92.6 FL (ref 80–96)
MONOCYTES # BLD AUTO: 0.54 K/UL (ref 0–0.8)
MONOCYTES NFR BLD AUTO: 7.2 % (ref 2–6)
MPC BLD CALC-MCNC: 10.4 FL (ref 9.4–12.4)
NEUTROPHILS # BLD AUTO: 5.1 K/UL (ref 1.8–7.7)
NEUTROPHILS NFR BLD AUTO: 67.9 % (ref 53–65)
NONHDLC SERPL-MCNC: 78 MG/DL
NRBC # BLD AUTO: 0 X10E3/UL
NRBC, AUTO (.00): 0 %
NT-PROBNP SERPL-MCNC: 214 PG/ML (ref 1–450)
PISA TR MAX VEL: 2.87 M/S
PLATELET # BLD AUTO: 206 K/UL (ref 150–400)
POTASSIUM SERPL-SCNC: 3.5 MMOL/L (ref 3.5–5.1)
PROT SERPL-MCNC: 6.8 G/DL (ref 6.4–8.2)
PROTHROMBIN TIME: 13 SECONDS (ref 11.7–14.7)
PV PEAK GRADIENT: 2 MMHG
PV PEAK VELOCITY: 0.71 M/S
RA MAJOR: 2.88 CM
RA PRESSURE ESTIMATED: 3 MMHG
RBC # BLD AUTO: 3.8 M/UL (ref 4.6–6.2)
RIGHT VENTRICLE DIASTOLIC BASEL DIMENSION: 3 CM
RIGHT VENTRICLE DIASTOLIC LENGTH: 5.9 CM
RIGHT VENTRICLE DIASTOLIC MID DIMENSION: 2.7 CM
RIGHT VENTRICULAR LENGTH IN DIASTOLE (APICAL 4-CHAMBER VIEW): 5.91 CM
RV MID DIAMA: 2.74 CM
RV TB RVSP: 6 MMHG
SODIUM SERPL-SCNC: 140 MMOL/L (ref 136–145)
TDI LATERAL: 0.08 M/S
TDI SEPTAL: 0.08 M/S
TDI: 0.08 M/S
TR MAX PG: 33 MMHG
TRICUSPID ANNULAR PLANE SYSTOLIC EXCURSION: 1.16 CM
TRIGL SERPL-MCNC: 145 MG/DL (ref 35–150)
TROPONIN I SERPL DL<=0.01 NG/ML-MCNC: 17 PG/ML
TROPONIN I SERPL DL<=0.01 NG/ML-MCNC: 6 PG/ML
TSH SERPL DL<=0.005 MIU/L-ACNC: 3.59 UIU/ML (ref 0.36–3.74)
TV REST PULMONARY ARTERY PRESSURE: 36 MMHG
VLDLC SERPL-MCNC: 29 MG/DL
WBC # BLD AUTO: 7.52 K/UL (ref 4.5–11)
Z-SCORE OF LEFT VENTRICULAR DIMENSION IN END DIASTOLE: -3.18
Z-SCORE OF LEFT VENTRICULAR DIMENSION IN END SYSTOLE: -1.47

## 2024-07-16 PROCEDURE — 84484 ASSAY OF TROPONIN QUANT: CPT | Performed by: HOSPITALIST

## 2024-07-16 PROCEDURE — 99285 EMERGENCY DEPT VISIT HI MDM: CPT | Mod: 25

## 2024-07-16 PROCEDURE — 80061 LIPID PANEL: CPT | Performed by: HOSPITALIST

## 2024-07-16 PROCEDURE — 84484 ASSAY OF TROPONIN QUANT: CPT | Performed by: EMERGENCY MEDICINE

## 2024-07-16 PROCEDURE — 36415 COLL VENOUS BLD VENIPUNCTURE: CPT | Performed by: EMERGENCY MEDICINE

## 2024-07-16 PROCEDURE — 96374 THER/PROPH/DIAG INJ IV PUSH: CPT

## 2024-07-16 PROCEDURE — 93005 ELECTROCARDIOGRAM TRACING: CPT

## 2024-07-16 PROCEDURE — 25000003 PHARM REV CODE 250: Performed by: HOSPITALIST

## 2024-07-16 PROCEDURE — 83036 HEMOGLOBIN GLYCOSYLATED A1C: CPT | Performed by: HOSPITALIST

## 2024-07-16 PROCEDURE — 99223 1ST HOSP IP/OBS HIGH 75: CPT | Mod: ,,, | Performed by: HOSPITALIST

## 2024-07-16 PROCEDURE — 84443 ASSAY THYROID STIM HORMONE: CPT | Performed by: HOSPITALIST

## 2024-07-16 PROCEDURE — 63600175 PHARM REV CODE 636 W HCPCS: Performed by: HOSPITALIST

## 2024-07-16 PROCEDURE — 93010 ELECTROCARDIOGRAM REPORT: CPT | Mod: 76,,, | Performed by: HOSPITALIST

## 2024-07-16 PROCEDURE — 85610 PROTHROMBIN TIME: CPT | Performed by: EMERGENCY MEDICINE

## 2024-07-16 PROCEDURE — 80053 COMPREHEN METABOLIC PANEL: CPT | Performed by: EMERGENCY MEDICINE

## 2024-07-16 PROCEDURE — 85730 THROMBOPLASTIN TIME PARTIAL: CPT | Performed by: EMERGENCY MEDICINE

## 2024-07-16 PROCEDURE — 25000003 PHARM REV CODE 250: Performed by: EMERGENCY MEDICINE

## 2024-07-16 PROCEDURE — 11000001 HC ACUTE MED/SURG PRIVATE ROOM

## 2024-07-16 PROCEDURE — 93010 ELECTROCARDIOGRAM REPORT: CPT | Mod: ,,, | Performed by: HOSPITALIST

## 2024-07-16 PROCEDURE — 36415 COLL VENOUS BLD VENIPUNCTURE: CPT | Performed by: HOSPITALIST

## 2024-07-16 PROCEDURE — 83880 ASSAY OF NATRIURETIC PEPTIDE: CPT | Performed by: EMERGENCY MEDICINE

## 2024-07-16 PROCEDURE — 85025 COMPLETE CBC W/AUTO DIFF WBC: CPT | Performed by: EMERGENCY MEDICINE

## 2024-07-16 PROCEDURE — 83735 ASSAY OF MAGNESIUM: CPT | Performed by: EMERGENCY MEDICINE

## 2024-07-16 PROCEDURE — 96375 TX/PRO/DX INJ NEW DRUG ADDON: CPT

## 2024-07-16 RX ORDER — ASPIRIN 81 MG/1
81 TABLET ORAL DAILY
COMMUNITY
Start: 2024-06-07

## 2024-07-16 RX ORDER — ACETAMINOPHEN 500 MG
1000 TABLET ORAL EVERY 6 HOURS PRN
Status: DISCONTINUED | OUTPATIENT
Start: 2024-07-16 | End: 2024-07-17 | Stop reason: HOSPADM

## 2024-07-16 RX ORDER — PANTOPRAZOLE SODIUM 40 MG/1
40 TABLET, DELAYED RELEASE ORAL DAILY
Status: DISCONTINUED | OUTPATIENT
Start: 2024-07-16 | End: 2024-07-17 | Stop reason: HOSPADM

## 2024-07-16 RX ORDER — TRAZODONE HYDROCHLORIDE 50 MG/1
50 TABLET ORAL NIGHTLY PRN
Status: DISCONTINUED | OUTPATIENT
Start: 2024-07-16 | End: 2024-07-17 | Stop reason: HOSPADM

## 2024-07-16 RX ORDER — BISACODYL 5 MG
10 TABLET, DELAYED RELEASE (ENTERIC COATED) ORAL DAILY PRN
Status: DISCONTINUED | OUTPATIENT
Start: 2024-07-16 | End: 2024-07-17 | Stop reason: HOSPADM

## 2024-07-16 RX ORDER — ATORVASTATIN CALCIUM 40 MG/1
40 TABLET, FILM COATED ORAL DAILY
Status: DISCONTINUED | OUTPATIENT
Start: 2024-07-16 | End: 2024-07-17 | Stop reason: HOSPADM

## 2024-07-16 RX ORDER — DILTIAZEM HYDROCHLORIDE 5 MG/ML
20 INJECTION INTRAVENOUS
Status: COMPLETED | OUTPATIENT
Start: 2024-07-16 | End: 2024-07-16

## 2024-07-16 RX ORDER — SIMETHICONE 80 MG
1 TABLET,CHEWABLE ORAL 3 TIMES DAILY PRN
Status: DISCONTINUED | OUTPATIENT
Start: 2024-07-16 | End: 2024-07-17 | Stop reason: HOSPADM

## 2024-07-16 RX ORDER — CARVEDILOL 6.25 MG/1
6.25 TABLET ORAL 2 TIMES DAILY
Status: DISCONTINUED | OUTPATIENT
Start: 2024-07-16 | End: 2024-07-17

## 2024-07-16 RX ORDER — CARVEDILOL 12.5 MG/1
12.5 TABLET ORAL 2 TIMES DAILY
Status: DISCONTINUED | OUTPATIENT
Start: 2024-07-16 | End: 2024-07-16

## 2024-07-16 RX ORDER — TALC
6 POWDER (GRAM) TOPICAL NIGHTLY PRN
Status: DISCONTINUED | OUTPATIENT
Start: 2024-07-16 | End: 2024-07-17 | Stop reason: HOSPADM

## 2024-07-16 RX ORDER — ONDANSETRON HYDROCHLORIDE 2 MG/ML
8 INJECTION, SOLUTION INTRAVENOUS EVERY 6 HOURS PRN
Status: DISCONTINUED | OUTPATIENT
Start: 2024-07-16 | End: 2024-07-17 | Stop reason: HOSPADM

## 2024-07-16 RX ORDER — GUAIFENESIN AND DEXTROMETHORPHAN HYDROBROMIDE 10; 100 MG/5ML; MG/5ML
10 SYRUP ORAL EVERY 6 HOURS PRN
Status: DISCONTINUED | OUTPATIENT
Start: 2024-07-16 | End: 2024-07-17 | Stop reason: HOSPADM

## 2024-07-16 RX ADMIN — AMIODARONE HYDROCHLORIDE 1 MG/MIN: 1.8 INJECTION, SOLUTION INTRAVENOUS at 05:07

## 2024-07-16 RX ADMIN — AMIODARONE HYDROCHLORIDE 150 MG: 1.5 INJECTION, SOLUTION INTRAVENOUS at 04:07

## 2024-07-16 RX ADMIN — PANTOPRAZOLE SODIUM 40 MG: 40 TABLET, DELAYED RELEASE ORAL at 09:07

## 2024-07-16 RX ADMIN — ATORVASTATIN CALCIUM 40 MG: 40 TABLET, FILM COATED ORAL at 09:07

## 2024-07-16 RX ADMIN — DILTIAZEM HYDROCHLORIDE 20 MG: 5 INJECTION INTRAVENOUS at 03:07

## 2024-07-16 RX ADMIN — CARVEDILOL 12.5 MG: 12.5 TABLET, FILM COATED ORAL at 09:07

## 2024-07-16 RX ADMIN — DILTIAZEM HYDROCHLORIDE 20 MG: 5 INJECTION, SOLUTION INTRAVENOUS at 03:07

## 2024-07-16 NOTE — ED PROVIDER NOTES
Encounter Date: 7/16/2024       History     Chief Complaint   Patient presents with    Chest Pain    Tachycardia     77 Y/O MALE WITH CHEST DISCOMFORT AND HEART RACING THAT STARTED BOTHERING HIM 2 DAYS AGO.  HE SAYS HE HAS PREVIOUSLY HAD AN ABLATION FOR A-FIB...  HE NOTES NO PARTICULAR REMITTING OR EXACERBATING FACTORS.        Review of patient's allergies indicates:  No Known Allergies  Past Medical History:   Diagnosis Date    Atrial fibrillation with slow ventricular response     COVID-19 11/01/2021    Hiatal hernia with gastroesophageal reflux disease and esophagitis     Hyperlipidemia     [place] grade C esophagitis 05/09/2023    Malignant neoplasm of overlapping sites of bladder 04/10/2024    Per records from [facility] received 04/10/2023:  Right posterior bladder mass on MRI prostate 06/14/2023.       CT urogram obtain 06/21/2023 with 1.6 cm polypoid intraluminal filling defect along the posterior lateral urinary bladder wall highly suspicious for transitional cell carcinoma.     Cystoscopy on 06/22/2023 showed a 3 cm papillary right bladder wall lesion.     TURBT > 5 cm perf    Malignant neoplasm of prostate 03/28/2024 July 2023 diagnosed per Dr. Filiberto Garcia.  Completed radiation 10/19/2023.      Primary hypertension     Tricuspid regurgitation     follows with Dr. Ma     Past Surgical History:   Procedure Laterality Date    ABLATION      Dr[facility] M[facility]iegos at [facility]    EYE SURGERY Bilateral     cataract removal    SHOULDER SURGERY Left     TONSILLECTOMY       Family History   Problem Relation Name Age of Onset    Hypertension Mother      Hypertension Father      Cancer Sister      Leukemia Sister      Cancer Brother      Prostate cancer Brother      Cancer Brother      Bladder Cancer Brother       Social History     Tobacco Use    Smoking status: Never     Passive exposure: Never    Smokeless tobacco: Never   Substance Use Topics    Alcohol use: Never    Drug use: Never     Review of Systems    All other systems reviewed and are negative.      Physical Exam     Initial Vitals [07/16/24 0309]   BP Pulse Resp Temp SpO2   (!) 145/92 (!) 162 20 97.8 °F (36.6 °C) 97 %      MAP       --         Physical Exam    Nursing note and vitals reviewed.  Constitutional: He appears well-developed and well-nourished.   HENT:   Head: Normocephalic and atraumatic.   Nose: Nose normal.   Mouth/Throat: Oropharynx is clear and moist.   Eyes: Conjunctivae and EOM are normal. Pupils are equal, round, and reactive to light.   Neck: Neck supple.   Normal range of motion.  Cardiovascular:  Normal heart sounds.           TACHYCARDIC AND IRREGULARLY IRREGULAR.     Pulmonary/Chest: Breath sounds normal.   Abdominal: Abdomen is soft. Bowel sounds are normal.   Musculoskeletal:         General: Normal range of motion.      Cervical back: Normal range of motion and neck supple.     Neurological: He is alert and oriented to person, place, and time. He has normal strength. GCS score is 15. GCS eye subscore is 4. GCS verbal subscore is 5. GCS motor subscore is 6.         Medical Screening Exam   See Full Note    ED Course   Procedures  Labs Reviewed   CBC WITH DIFFERENTIAL - Abnormal; Notable for the following components:       Result Value    RBC 3.80 (*)     Hemoglobin 11.5 (*)     Hematocrit 35.2 (*)     Neutrophils % 67.9 (*)     Lymphocytes % 20.2 (*)     Monocytes % 7.2 (*)     Eosinophils % 4.1 (*)     All other components within normal limits   PROTIME-INR - Normal   APTT - Normal   CBC W/ AUTO DIFFERENTIAL    Narrative:     The following orders were created for panel order CBC auto differential.  Procedure                               Abnormality         Status                     ---------                               -----------         ------                     CBC with Differential[[id number]]       Abnormal            Final result                 Please view results for these tests on the individual orders.   COMPREHENSIVE  METABOLIC PANEL   NT-PRO NATRIURETIC PEPTIDE   MAGNESIUM   TROPONIN I          Imaging Results              X-Ray Chest AP Portable (In process)                      Medications   diltiaZEM injection 20 mg (has no administration in time range)   diltiaZEM injection 20 mg (20 mg Intravenous Given 7/16/24 0305)     Medical Decision Making  Amount and/or Complexity of Data Reviewed  Labs: ordered.  Radiology: ordered.    Risk  Prescription drug management.                                      Clinical Impression:   Final diagnoses:  [I48.91] Atrial fibrillation  [R07.9] Chest pain               King Freeman MD  07/16/24 0356

## 2024-07-16 NOTE — SUBJECTIVE & OBJECTIVE
Past Medical History:   Diagnosis Date    Atrial fibrillation with slow ventricular response     COVID-19 11/01/2021    Hiatal hernia with gastroesophageal reflux disease and esophagitis     Hyperlipidemia     [place] grade C esophagitis 05/09/2023    Malignant neoplasm of overlapping sites of bladder 04/10/2024    Per records from [facility] received 04/10/2023:  Right posterior bladder mass on MRI prostate 06/14/2023.       CT urogram obtain 06/21/2023 with 1.6 cm polypoid intraluminal filling defect along the posterior lateral urinary bladder wall highly suspicious for transitional cell carcinoma.     Cystoscopy on 06/22/2023 showed a 3 cm papillary right bladder wall lesion.     TURBT > 5 cm perf    Malignant neoplasm of prostate 03/28/2024 July 2023 diagnosed per Dr. Filiberto Garcia.  Completed radiation 10/19/2023.      Primary hypertension     Tricuspid regurgitation     follows with Dr. Ma       Past Surgical History:   Procedure Laterality Date    ABLATION      Dr. Mazariegos at [facility]    EYE SURGERY Bilateral     cataract removal    SHOULDER SURGERY Left     TONSILLECTOMY         Review of patient's allergies indicates:  No Known Allergies    No current facility-administered medications on file prior to encounter.     Current Outpatient Medications on File Prior to Encounter   Medication Sig    amLODIPine (NORVASC) 5 MG tablet Take 1 tablet (5 mg total) by mouth once daily.    carvediloL (COREG) 12.5 MG tablet Take 1 tablet (12.5 mg total) by mouth 2 (two) times daily.    cholecalciferol, vitamin D3, (VITAMIN D3) 50 mcg (2,000 unit) Cap capsule Take by mouth once daily.    omega-3 fatty acids/fish oil (FISH OIL-OMEGA-3 FATTY ACIDS) 300-1,000 mg capsule Take 1 capsule by mouth once daily.    pantoprazole (PROTONIX) 40 MG tablet TAKE 1 TABLET DAILY    rosuvastatin (CRESTOR) 10 MG tablet Take 1 tablet (10 mg total) by mouth once daily.     Family History       Problem Relation (Age of Onset)     Bladder Cancer Brother    Cancer Sister, Brother, Brother    Hypertension Mother, Father    Leukemia Sister    Prostate cancer Brother          Tobacco Use    Smoking status: Never     Passive exposure: Never    Smokeless tobacco: Never   Substance and Sexual Activity    Alcohol use: Never    Drug use: Never    Sexual activity: Yes     Partners: Female     Review of Systems   Constitutional:  Negative for appetite change, chills, fatigue, fever and unexpected weight change.   HENT:  Negative for congestion, mouth sores, nosebleeds, sinus pain, sore throat and trouble swallowing.    Eyes:  Negative for visual disturbance.   Respiratory:  Positive for chest tightness. Negative for apnea, cough and shortness of breath.    Cardiovascular:  Positive for palpitations. Negative for chest pain and leg swelling.   Gastrointestinal:  Negative for abdominal pain, blood in stool, constipation, diarrhea, nausea and vomiting.   Endocrine: Negative for polyuria.   Genitourinary:  Negative for decreased urine volume, difficulty urinating, dysuria and frequency.   Musculoskeletal:  Negative for arthralgias, back pain and neck pain.   Skin:  Negative for rash.   Neurological:  Negative for syncope, light-headedness and headaches.   Hematological:  Does not bruise/bleed easily.   Psychiatric/Behavioral:  Negative for confusion and suicidal ideas.      Objective:     Vital Signs (Most Recent):  Temp: 97.8 °F (36.6 °C) (07/16/24 0309)  Pulse: (!) 162 (07/16/24 0309)  Resp: 20 (07/16/24 0309)  BP: (!) 145/92 (07/16/24 0309)  SpO2: 97 % (07/16/24 0309) Vital Signs (24h Range):  Temp:  [97.8 °F (36.6 °C)] 97.8 °F (36.6 °C)  Pulse:  [162] 162  Resp:  [20] 20  SpO2:  [97 %] 97 %  BP: (145)/(92) 145/92     Weight: 70.3 kg (155 lb)  Body mass index is 25.02 kg/m².     Physical Exam  Vitals and nursing note reviewed. Exam conducted with a chaperone present.   Constitutional:       Appearance: Normal appearance.   HENT:      Head: Atraumatic.       Mouth/Throat:      Mouth: Mucous membranes are moist.      Pharynx: Oropharynx is clear.   Eyes:      Conjunctiva/sclera: Conjunctivae normal.      Pupils: Pupils are equal, round, and reactive to light.   Neck:      Vascular: No carotid bruit.   Cardiovascular:      Rate and Rhythm: Tachycardia present. Rhythm irregular.      Pulses: Normal pulses.      Heart sounds: Normal heart sounds.   Pulmonary:      Effort: Pulmonary effort is normal.      Breath sounds: Normal breath sounds.   Abdominal:      General: Abdomen is flat. Bowel sounds are normal.      Palpations: Abdomen is soft.      Tenderness: There is no abdominal tenderness. There is no right CVA tenderness or left CVA tenderness.   Musculoskeletal:         General: No deformity or signs of injury. Normal range of motion.      Cervical back: Neck supple.      Right lower leg: No edema.      Left lower leg: No edema.   Skin:     General: Skin is warm and dry.      Capillary Refill: Capillary refill takes less than 2 seconds.      Coloration: Skin is not jaundiced or pale.      Findings: No bruising, lesion or rash.   Neurological:      General: No focal deficit present.      Mental Status: He is alert and oriented to person, place, and time.   Psychiatric:         Mood and Affect: Mood normal.              CRANIAL NERVES     CN III, IV, VI   Pupils are equal, round, and reactive to light.       Significant Labs: All pertinent labs within the past 24 hours have been reviewed.    Significant Imaging: I have reviewed all pertinent imaging results/findings within the past 24 hours.

## 2024-07-16 NOTE — ASSESSMENT & PLAN NOTE
Amiodarone infusion started.  Patient appears euvolemic.    Will continue his coreg at 12.5.  He has had problem with bradycardia before as well.    2D echo pending and will monitor closely on telemetry.    Will trend trop and follow lipid and A1C results.

## 2024-07-16 NOTE — HPI
77 yo M presents to [facility] ED with chest tightness across his chest just under the nipple line and palpitations that woke him from sleep at 0100.  He states that he has had problems with his rhythm both fast and slow.  He has had an ablation by Dr. Casimiro Avery and also had a watchman procedure because he had gum bleeding and nose bleed with eliquis.  He states that he has had atrial fibrillation his entire life.  He was diagnosed in his youth in the [employer] but says he had HR that went up and down his entire life.  He could not do anything to make it better or worse.  He tried breathing deeply.  Thought it might be a psychological problem.  He has not taken any cold medicine nor had any dyspnea or congestion.  He says he ate a bunch of chocolate with his wife on Saturday and thinks that may have done it.      Patient was found to be in atrial fibrillation with RVR with rate of 170.  Initial trop 5 and second is pending along with lipids and A1c.  TSH added and was normal.  He states that the IV cardizem that lowered his rate stopped the chest pain.  However, even after two dose, his HR is increasing again.  He follows with Dr. Ma.  I do not see an echo at this time.  Will order.

## 2024-07-16 NOTE — PHARMACY MED REC
"Admission Medication History     The home medication history was taken by Mere Oshea.    You may go to "Admission" then "Reconcile Home Medications" tabs to review and/or act upon these items.     The home medication list has been updated by the Pharmacy department.   Please read ALL comments highlighted in yellow.   Please address this information as you see fit.    Feel free to contact us if you have any questions or require assistance.  Medications added:  Aspirin 81 mg          Medications listed below were obtained from: Patient/family and Analytic software- [employer] ([employer])  (Not in a hospital admission)      Current Outpatient Medications on File Prior to Encounter   Medication Sig Dispense Refill Last Dose    amLODIPine (NORVASC) 5 MG tablet Take 1 tablet (5 mg total) by mouth once daily. 90 tablet 3 7/16/2024    aspirin (ECOTRIN) 81 MG EC tablet Take 81 mg by mouth once daily.   7/15/2024    carvediloL (COREG) 12.5 MG tablet Take 1 tablet (12.5 mg total) by mouth 2 (two) times daily. 180 tablet 1 7/16/2024    cholecalciferol, vitamin D3, (VITAMIN D3) 50 mcg (2,000 unit) Cap capsule Take by mouth once daily.   7/15/2024    omega-3 fatty acids/fish oil (FISH OIL-OMEGA-3 FATTY ACIDS) 300-1,000 mg capsule Take 1 capsule by mouth once daily.   7/15/2024    pantoprazole (PROTONIX) 40 MG tablet TAKE 1 TABLET DAILY 90 tablet 3 7/16/2024    rosuvastatin (CRESTOR) 10 MG tablet Take 1 tablet (10 mg total) by mouth once daily. 90 tablet 3 7/16/2024       Potential issues to be addressed PRIOR TO DISCHARGE  No issues identified    Mere Oshea  [id number]  .          "

## 2024-07-16 NOTE — PLAN OF CARE
[facility] - Emergency Department  Initial Discharge Assessment       Primary Care Provider: Juli Zapata FNP    Admission Diagnosis: Atrial fibrillation with rapid ventricular response [I48.91]    Admission Date: 7/16/2024  Expected Discharge Date: 7/17/2024    Transition of Care Barriers: None    Payor: MEDICARE / Plan: MEDICARE PART A & B / Product Type: Government /     Extended Emergency Contact Information  Primary Emergency Contact: ELIAS WHITLOCK  Address: [address] [place], [place] [address] [place]  Mobile Phone: [phone]  Relation: Daughter  Preferred language: English   needed? No  Secondary Emergency Contact: LINDACLAUDIA OTERO  Mobile Phone: [phone]  Relation: Spouse   needed? No    Discharge Plan A: Home  Discharge Plan B: Home      [facility] - [place], [place] - [address] [place] [address]  [address] [place] [address]  Phone: [phone] Fax: [phone]    [address] [address]  Phone: [phone] Fax: [phone]    [id number] [place], [place] - [address] [facility]  [address] [facility] [address]  Phone: [phone] Fax: [phone]    [facility]  [address] [place] [address]  Phone: [phone] Fax: [phone]      Initial Assessment (most recent)       Adult Discharge Assessment - 07/16/24 1128          Discharge Assessment    Assessment Type Discharge Planning Assessment     Source of Information patient     Communicated ALEAH with patient/caregiver Yes     People in Home alone     Do you expect to return to your current living situation? Yes     Do you have help at home or someone to help you manage your care at home? Yes     Who are your caregiver(s) and their phone number(s)? cassi blancas lives across the road from patient-[phone]      Prior to hospitilization cognitive status: Alert/Oriented     Current cognitive status: Alert/Oriented     Walking or Climbing Stairs Difficulty no     Dressing/Bathing Difficulty no     Equipment Currently Used at Home none     Patient currently being followed by outpatient case management? No     Do you currently have service(s) that help you manage your care at home? No     Do you take prescription medications? Yes     Do you have prescription coverage? Yes     Coverage medicare and      Do you have any problems affording any of your prescribed medications? No     Is the patient taking medications as prescribed? yes     Who is going to help you get home at discharge? daughter     How do you get to doctors appointments? car, drives self;family or friend will provide     Are you on dialysis? No     Do you take coumadin? No     Discharge Plan A Home     Discharge Plan B Home     DME Needed Upon Discharge  none     Discharge Plan discussed with: Patient     Transition of Care Barriers None                      Spoke with patient in his room. He lives alone. His daughter yonny lives close to him. He uses no dme. No home services. Prior to admit patient cared for self including driving. Dc plan is home. IM explained and signed. SDOH completed 3 months ago. Cm will follow.

## 2024-07-16 NOTE — H&P
[facility] - Emergency Department  Hospital Medicine  History & Physical    Patient Name: Ruben Alarcon  MRN: [id number]  Patient Class: IP- Inpatient  Admission Date: 7/16/2024  Attending Physician: Shane Reid MD   Primary Care Provider: Juli Zapata FNP         Patient information was obtained from patient, past medical records, and ER records.     Subjective:     Principal Problem:Atrial fibrillation with RVR    Chief Complaint:   Chief Complaint   Patient presents with    Chest Pain    Tachycardia        HPI: 77 yo M presents to [facility] ED with chest tightness across his chest just under the nipple line and palpitations that woke him from sleep at 0100.  He states that he has had problems with his rhythm both fast and slow.  He has had an ablation by Dr. Casimiro Avery and also had a watchman procedure because he had gum bleeding and nose bleed with eliquis.  He states that he has had atrial fibrillation his entire life.  He was diagnosed in his youth in the [employer] but says he had HR that went up and down his entire life.  He could not do anything to make it better or worse.  He tried breathing deeply.  Thought it might be a psychological problem.  He has not taken any cold medicine nor had any dyspnea or congestion.  He says he ate a bunch of chocolate with his wife on Saturday and thinks that may have done it.      Patient was found to be in atrial fibrillation with RVR with rate of 170.  Initial trop 5 and second is pending along with lipids and A1c.  TSH added and was normal.  He states that the IV cardizem that lowered his rate stopped the chest pain.  However, even after two dose, his HR is increasing again.  He follows with Dr. Ma.  I do not see an echo at this time.  Will order.      Past Medical History:   Diagnosis Date    Atrial fibrillation with slow ventricular response     COVID-19 11/01/2021    Hiatal hernia with gastroesophageal reflux disease and esophagitis      Hyperlipidemia     [place] grade C esophagitis 05/09/2023    Malignant neoplasm of overlapping sites of bladder 04/10/2024    Per records from [facility] received 04/10/2023:  Right posterior bladder mass on MRI prostate 06/14/2023.       CT urogram obtain 06/21/2023 with 1.6 cm polypoid intraluminal filling defect along the posterior lateral urinary bladder wall highly suspicious for transitional cell carcinoma.     Cystoscopy on 06/22/2023 showed a 3 cm papillary right bladder wall lesion.     TURBT > 5 cm perf    Malignant neoplasm of prostate 03/28/2024 July 2023 diagnosed per Dr. Filiberto Garcia.  Completed radiation 10/19/2023.      Primary hypertension     Tricuspid regurgitation     follows with Dr. Ma       Past Surgical History:   Procedure Laterality Date    ABLATION      Dr. Mazariegos at [facility]    EYE SURGERY Bilateral     cataract removal    SHOULDER SURGERY Left     TONSILLECTOMY         Review of patient's allergies indicates:  No Known Allergies    No current facility-administered medications on file prior to encounter.     Current Outpatient Medications on File Prior to Encounter   Medication Sig    amLODIPine (NORVASC) 5 MG tablet Take 1 tablet (5 mg total) by mouth once daily.    carvediloL (COREG) 12.5 MG tablet Take 1 tablet (12.5 mg total) by mouth 2 (two) times daily.    cholecalciferol, vitamin D3, (VITAMIN D3) 50 mcg (2,000 unit) Cap capsule Take by mouth once daily.    omega-3 fatty acids/fish oil (FISH OIL-OMEGA-3 FATTY ACIDS) 300-1,000 mg capsule Take 1 capsule by mouth once daily.    pantoprazole (PROTONIX) 40 MG tablet TAKE 1 TABLET DAILY    rosuvastatin (CRESTOR) 10 MG tablet Take 1 tablet (10 mg total) by mouth once daily.     Family History       Problem Relation (Age of Onset)    Bladder Cancer Brother    Cancer Sister, Brother, Brother    Hypertension Mother, Father    Leukemia Sister    Prostate cancer Brother          Tobacco Use    Smoking status: Never     Passive  exposure: Never    Smokeless tobacco: Never   Substance and Sexual Activity    Alcohol use: Never    Drug use: Never    Sexual activity: Yes     Partners: Female     Review of Systems   Constitutional:  Negative for appetite change, chills, fatigue, fever and unexpected weight change.   HENT:  Negative for congestion, mouth sores, nosebleeds, sinus pain, sore throat and trouble swallowing.    Eyes:  Negative for visual disturbance.   Respiratory:  Positive for chest tightness. Negative for apnea, cough and shortness of breath.    Cardiovascular:  Positive for palpitations. Negative for chest pain and leg swelling.   Gastrointestinal:  Negative for abdominal pain, blood in stool, constipation, diarrhea, nausea and vomiting.   Endocrine: Negative for polyuria.   Genitourinary:  Negative for decreased urine volume, difficulty urinating, dysuria and frequency.   Musculoskeletal:  Negative for arthralgias, back pain and neck pain.   Skin:  Negative for rash.   Neurological:  Negative for syncope, light-headedness and headaches.   Hematological:  Does not bruise/bleed easily.   Psychiatric/Behavioral:  Negative for confusion and suicidal ideas.      Objective:     Vital Signs (Most Recent):  Temp: 97.8 °F (36.6 °C) (07/16/24 0309)  Pulse: (!) 162 (07/16/24 0309)  Resp: 20 (07/16/24 0309)  BP: (!) 145/92 (07/16/24 0309)  SpO2: 97 % (07/16/24 0309) Vital Signs (24h Range):  Temp:  [97.8 °F (36.6 °C)] 97.8 °F (36.6 °C)  Pulse:  [162] 162  Resp:  [20] 20  SpO2:  [97 %] 97 %  BP: (145)/(92) 145/92     Weight: 70.3 kg (155 lb)  Body mass index is 25.02 kg/m².     Physical Exam  Vitals and nursing note reviewed. Exam conducted with a chaperone present.   Constitutional:       Appearance: Normal appearance.   HENT:      Head: Atraumatic.      Mouth/Throat:      Mouth: Mucous membranes are moist.      Pharynx: Oropharynx is clear.   Eyes:      Conjunctiva/sclera: Conjunctivae normal.      Pupils: Pupils are equal, round, and  reactive to light.   Neck:      Vascular: No carotid bruit.   Cardiovascular:      Rate and Rhythm: Tachycardia present. Rhythm irregular.      Pulses: Normal pulses.      Heart sounds: Normal heart sounds.   Pulmonary:      Effort: Pulmonary effort is normal.      Breath sounds: Normal breath sounds.   Abdominal:      General: Abdomen is flat. Bowel sounds are normal.      Palpations: Abdomen is soft.      Tenderness: There is no abdominal tenderness. There is no right CVA tenderness or left CVA tenderness.   Musculoskeletal:         General: No deformity or signs of injury. Normal range of motion.      Cervical back: Neck supple.      Right lower leg: No edema.      Left lower leg: No edema.   Skin:     General: Skin is warm and dry.      Capillary Refill: Capillary refill takes less than 2 seconds.      Coloration: Skin is not jaundiced or pale.      Findings: No bruising, lesion or rash.   Neurological:      General: No focal deficit present.      Mental Status: He is alert and oriented to person, place, and time.   Psychiatric:         Mood and Affect: Mood normal.              CRANIAL NERVES     CN III, IV, VI   Pupils are equal, round, and reactive to light.       Significant Labs: All pertinent labs within the past 24 hours have been reviewed.    Significant Imaging: I have reviewed all pertinent imaging results/findings within the past 24 hours.  Assessment/Plan:     * Atrial fibrillation with RVR  Amiodarone infusion started.  Patient appears euvolemic.    Will continue his coreg at 12.5.  He has had problem with bradycardia before as well.    2D echo pending and will monitor closely on telemetry.    Will trend trop and follow lipid and A1C results.      Primary hypertension  Chronic, controlled. Latest blood pressure and vitals reviewed-     Temp:  [97.8 °F (36.6 °C)]   Pulse:  []   Resp:  [14-20]   BP: ()/(61-92)   SpO2:  [91 %-97 %] .   Home meds for hypertension were reviewed and noted  below.   Hypertension Medications               amLODIPine (NORVASC) 5 MG tablet Take 1 tablet (5 mg total) by mouth once daily.    carvediloL (COREG) 12.5 MG tablet Take 1 tablet (12.5 mg total) by mouth 2 (two) times daily.            While in the hospital, will manage blood pressure as follows; Continue home antihypertensive regimen    Will utilize p.r.n. blood pressure medication only if patient's blood pressure greater than 140/90 and he develops symptoms such as worsening chest pain or shortness of breath.    Tricuspid regurgitation  2D echo pending    No echocardiogram results found for the past 12 months     [place] grade C esophagitis  Continue PPI      Hyperlipidemia  Continue statin.          VTE Risk Mitigation (From admission, onward)      RAISA due to his history of bleeding gums. May be able to tolerate asa if indicated.                              Heather Palumbo MD  Department of Hospital Medicine  [facility] Emergency Department

## 2024-07-16 NOTE — CARE UPDATE
Rate controlled with amiodarone.  Patient has been on amiodarone in the past prior to his ablation in 2020.  He has a patient of Dr. Ma.  We will ask Cardiology for input given his history of ablation and formerly having been on amiodarone.  Heart rate currently in the 50s but blood pressure good, asymptomatic.

## 2024-07-16 NOTE — ASSESSMENT & PLAN NOTE
Chronic, controlled. Latest blood pressure and vitals reviewed-     Temp:  [97.8 °F (36.6 °C)]   Pulse:  []   Resp:  [14-20]   BP: ()/(61-92)   SpO2:  [91 %-97 %] .   Home meds for hypertension were reviewed and noted below.   Hypertension Medications               amLODIPine (NORVASC) 5 MG tablet Take 1 tablet (5 mg total) by mouth once daily.    carvediloL (COREG) 12.5 MG tablet Take 1 tablet (12.5 mg total) by mouth 2 (two) times daily.            While in the hospital, will manage blood pressure as follows; Continue home antihypertensive regimen    Will utilize p.r.n. blood pressure medication only if patient's blood pressure greater than 140/90 and he develops symptoms such as worsening chest pain or shortness of breath.

## 2024-07-17 VITALS
OXYGEN SATURATION: 95 % | HEIGHT: 66 IN | RESPIRATION RATE: 18 BRPM | DIASTOLIC BLOOD PRESSURE: 87 MMHG | SYSTOLIC BLOOD PRESSURE: 174 MMHG | BODY MASS INDEX: 24.91 KG/M2 | WEIGHT: 155 LBS | HEART RATE: 62 BPM | TEMPERATURE: 98 F

## 2024-07-17 DIAGNOSIS — I48.91 ATRIAL FIBRILLATION, UNSPECIFIED TYPE: ICD-10-CM

## 2024-07-17 PROBLEM — I50.42 CHRONIC COMBINED SYSTOLIC AND DIASTOLIC HEART FAILURE: Status: ACTIVE | Noted: 2024-07-17

## 2024-07-17 PROBLEM — I50.20 HEART FAILURE WITH REDUCED EJECTION FRACTION: Status: ACTIVE | Noted: 2024-07-17

## 2024-07-17 PROBLEM — I50.20 HEART FAILURE WITH REDUCED EJECTION FRACTION: Status: RESOLVED | Noted: 2024-07-17 | Resolved: 2024-07-17

## 2024-07-17 PROCEDURE — 25000003 PHARM REV CODE 250: Performed by: HOSPITALIST

## 2024-07-17 PROCEDURE — 25000003 PHARM REV CODE 250: Performed by: NURSE PRACTITIONER

## 2024-07-17 PROCEDURE — 99239 HOSP IP/OBS DSCHRG MGMT >30: CPT | Mod: ,,, | Performed by: STUDENT IN AN ORGANIZED HEALTH CARE EDUCATION/TRAINING PROGRAM

## 2024-07-17 PROCEDURE — 99223 1ST HOSP IP/OBS HIGH 75: CPT | Mod: ,,, | Performed by: HOSPITALIST

## 2024-07-17 RX ORDER — SPIRONOLACTONE 25 MG/1
12.5 TABLET ORAL DAILY
Qty: 15 TABLET | Refills: 11 | Status: SHIPPED | OUTPATIENT
Start: 2024-07-17 | End: 2025-07-17

## 2024-07-17 RX ORDER — LOSARTAN POTASSIUM 25 MG/1
25 TABLET ORAL DAILY
Qty: 90 TABLET | Refills: 3 | Status: SHIPPED | OUTPATIENT
Start: 2024-07-17 | End: 2025-07-17

## 2024-07-17 RX ORDER — CARVEDILOL 12.5 MG/1
12.5 TABLET ORAL 2 TIMES DAILY
Qty: 180 TABLET | Refills: 1 | Status: SHIPPED | OUTPATIENT
Start: 2024-07-17

## 2024-07-17 RX ORDER — CARVEDILOL 12.5 MG/1
12.5 TABLET ORAL 2 TIMES DAILY
Status: DISCONTINUED | OUTPATIENT
Start: 2024-07-17 | End: 2024-07-17 | Stop reason: HOSPADM

## 2024-07-17 RX ORDER — SPIRONOLACTONE 25 MG/1
25 TABLET ORAL DAILY
Qty: 30 TABLET | Refills: 11 | Status: SHIPPED | OUTPATIENT
Start: 2024-07-17 | End: 2024-07-17

## 2024-07-17 RX ADMIN — ATORVASTATIN CALCIUM 40 MG: 40 TABLET, FILM COATED ORAL at 09:07

## 2024-07-17 RX ADMIN — CARVEDILOL 6.25 MG: 6.25 TABLET, FILM COATED ORAL at 09:07

## 2024-07-17 RX ADMIN — PANTOPRAZOLE SODIUM 40 MG: 40 TABLET, DELAYED RELEASE ORAL at 09:07

## 2024-07-17 NOTE — ASSESSMENT & PLAN NOTE
2D echo pending    Echo    Addendum Date: 7/16/2024        Left Ventricle: Left ventricle was not well visualized due to poor   sonic window. The left ventricle is moderately dilated. Moderately   increased ventricular mass. Normal wall thickness. There is concentric   hypertrophy. Regional wall motion abnormalities present. Mid posterior and   mid inferior wall segments most notably. There is severely reduced   systolic function. Ejection fraction by visual approximation is 35%. There   is diastolic dysfunction but grade cannot be determined. 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 Complete obliteration of the cavity.    Right Ventricle: Moderate right ventricular enlargement. Systolic   function is severely reduced.    Left Atrium: Left atrium is severely dilated.    Right Atrium: Right atrium is mildly dilated.    Mitral Valve: There is mild regurgitation.    Tricuspid Valve: There is mild to moderate regurgitation.    IVC/SVC: Normal venous pressure at 3 mmHg.    Pericardium: There is a moderate effusion. No indication of cardiac   tamponade. Evidence includes no IVC dilation.        Result Date: 7/16/2024    Left Ventricle: Left ventricle was not well visualized due to poor   sonic window. The left ventricle is moderately dilated. Moderately   increased ventricular mass. Normal wall thickness. There is moderate   asymmetric hypertrophy. Regional wall motion abnormalities present. Mid   posterior and mid inferior wall segments most notably. There is moderately   reduced systolic function with a visually estimated ejection fraction of   30 - 40%. Unable to assess diastolic function due to atrial fibrillation.   LV apical cavity obstruction at rest. Complete obliteration of the cavity.    Right Ventricle: Moderate right ventricular enlargement. Systolic   function is moderately reduced.    Left Atrium: Left atrium is severely dilated.    Right Atrium: Right atrium is mildly dilated.    Mitral Valve:  There is mild regurgitation.    Tricuspid Valve: There is mild to moderate regurgitation.    IVC/SVC: Normal venous pressure at 3 mmHg.    Pericardium: There is a moderate effusion. No indication of cardiac   tamponade. Evidence includes no IVC dilation.

## 2024-07-17 NOTE — PLAN OF CARE
[facility] - [facility] Telemetry  Discharge Final Note    Primary Care Provider: Juli Zapata FNP    Expected Discharge Date: 7/17/2024    Final Discharge Note (most recent)       Final Note - 07/17/24 1119          Final Note    Assessment Type Final Discharge Note     Anticipated Discharge Disposition Home or Self Care                     Important Message from Medicare  Important Message from Medicare regarding Discharge Appeal Rights: Explained to patient/caregiver, Signed/date by patient/caregiver     Date IMM was signed: 07/16/24  Time IMM was signed: 1115    Contact Info       Ruthy Davis ACNP   Specialty: Cardiology   Relationship: Nurse Practitioner    [address] [place] [address] [employer] [facility] [place] [address]   Phone: [phone]       Next Steps: Follow up on 7/31/2024    Instructions: Appointment scheduled with VICTORIA Davis on 7/31/2024 at 10:45am    Juli Zapata FNP   Specialty: Family Medicine   Relationship: PCP - General    [facility] [place] [address]   Phone: [phone]       Next Steps: Schedule an appointment as soon as possible for a visit in 1 week(s)          Dc home.

## 2024-07-17 NOTE — CONSULTS
Patient seen and evaluated by Dr. Trotter, full consult note to follow. He converted to NSR on IV amiodarone. Discontinue amiodarone and discharge on home dose of Coreg. Follow up in cardiology clinic in 2 weeks, appointment scheduled.

## 2024-07-17 NOTE — ASSESSMENT & PLAN NOTE
Chronic, controlled. Latest blood pressure and vitals reviewed-     Temp:  [98 °F (36.7 °C)-98.4 °F (36.9 °C)]   Pulse:  [52-65]   Resp:  [14-18]   BP: (114-175)/(67-93)   SpO2:  [94 %-98 %] .   Home meds for hypertension were reviewed and noted below.   Hypertension Medications               amLODIPine (NORVASC) 5 MG tablet Take 1 tablet (5 mg total) by mouth once daily.    carvediloL (COREG) 12.5 MG tablet Take 1 tablet (12.5 mg total) by mouth 2 (two) times daily.            While in the hospital, will manage blood pressure as follows; Continue home antihypertensive regimen    Will utilize p.r.n. blood pressure medication only if patient's blood pressure greater than 140/90 and he develops symptoms such as worsening chest pain or shortness of breath.

## 2024-07-17 NOTE — HOSPITAL COURSE
7/17 discussed case Cardiology.  Stable for discharge.  Follow up with Cardiology.  Follow up with PCP.  Given new heart failure we will start losartan and Aldactone in addition to Coreg.  Patient is euvolemic.

## 2024-07-17 NOTE — DISCHARGE SUMMARY
[facility] - [address] [place] [address] Medicine  Discharge Summary      Patient Name: Ruben Alarcon  MRN: [id number]  ANA MARIA: [id number]  Patient Class: IP- Inpatient  Admission Date: 7/16/2024  Hospital Length of Stay: 1 days  Discharge Date and Time:  07/17/2024 10:54 AM  Attending Physician: Fercho Chavez DO   Discharging Provider: Fercho Chavez DO  Primary Care Provider: Juli Zapata FNP    Primary Care Team: Networked reference to record PCT     HPI:   79 yo M [facility] ED with chest tightness across his chest just under the nipple line and palpitations that woke him from sleep at 0100.  He states that he has had problems with his rhythm both fast and slow.  He has had an ablation by Dr. Casimiro Avery and also had a watchman procedure because he had gum bleeding and nose bleed with eliquis.  He states that he has had atrial fibrillation his entire life.  He was diagnosed in his youth in the [employer] but says he had HR that went up and down his entire life.  He could not do anything to make it better or worse.  He tried breathing deeply.  Thought it might be a psychological problem.  He has not taken any cold medicine nor had any dyspnea or congestion.  He says he ate a bunch of chocolate with his wife on Saturday and thinks that may have done it.      Patient was found to be in atrial fibrillation with RVR with rate of 170.  Initial trop 5 and second is pending along with lipids and A1c.  TSH added and was normal.  He states that the IV cardizem that lowered his rate stopped the chest pain.  However, even after two dose, his HR is increasing again.  He follows with Dr. Ma.  I do not see an echo at this time.  Will order.      * No surgery found *      Hospital Course:   7/17 discussed case Cardiology.  Stable for discharge.  Follow up with Cardiology.  Follow up with PCP.  Given new heart failure we will start losartan and Aldactone in addition to Coreg.  Patient is  euvolemic.     Goals of Care Treatment Preferences:  Code Status: Full Code      Consults:   Consults (From admission, onward)          Status Ordering Provider     Inpatient consult to Cardiology  Once        Provider:  (Not yet assigned)    Ordered CRISTI DE LA TORRE            Cardiac/Vascular  * Atrial fibrillation with RVR  Amiodarone infusion started.  Patient appears euvolemic.    Will continue his coreg at 12.5.  He has had problem with bradycardia before as well.    2D echo pending and will monitor closely on telemetry.    Will trend trop and follow lipid and A1C results.    Follow up Cardiology outpatient    Chronic combined systolic and diastolic heart failure  Patient is identified as having Combined Systolic and Diastolic heart failure that is Chronic. CHF is currently controlled. Latest ECHO performed and demonstrates- Results for orders placed during the hospital encounter of 07/16/24    Echo    Interpretation Summary    Left Ventricle: Left ventricle was not well visualized due to poor sonic window. The left ventricle is moderately dilated. Moderately increased ventricular mass. Normal wall thickness. There is concentric hypertrophy. Regional wall motion abnormalities present. Mid posterior and mid inferior wall segments most notably. There is severely reduced systolic function. Ejection fraction by visual approximation is 35%. There is diastolic dysfunction but grade cannot be determined. LV apical cavity obstruction at rest. Complete obliteration of the cavity.    Right Ventricle: Moderate right ventricular enlargement. Systolic function is severely reduced.    Left Atrium: Left atrium is severely dilated.    Right Atrium: Right atrium is mildly dilated.    Mitral Valve: There is mild regurgitation.    Tricuspid Valve: There is mild to moderate regurgitation.    IVC/SVC: Normal venous pressure at 3 mmHg.    Pericardium: There is a moderate effusion. No indication of cardiac tamponade. Evidence includes  "no IVC dilation.  . Continue Beta Blocker, ACE/ARB, and Aldactone and monitor clinical status closely. Monitor on telemetry. Patient is off CHF pathway.  Monitor strict Is&Os and daily weights.  Place on fluid restriction of 2 L. Cardiology has been consulted. Continue to stress to patient importance of self efficacy and  on diet for CHF. Last BNP reviewed- and noted below No results for input(s): "BNP", "BNPTRIAGEBLO" in the last 168 hours.        Tricuspid regurgitation  2D echo pending    Echo    Addendum Date: 7/16/2024        Left Ventricle: Left ventricle was not well visualized due to poor   sonic window. The left ventricle is moderately dilated. Moderately   increased ventricular mass. Normal wall thickness. There is concentric   hypertrophy. Regional wall motion abnormalities present. Mid posterior and   mid inferior wall segments most notably. There is severely reduced   systolic function. Ejection fraction by visual approximation is 35%. There   is diastolic dysfunction but grade cannot be determined. 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 Complete obliteration of the cavity.    Right Ventricle: Moderate right ventricular enlargement. Systolic   function is severely reduced.    Left Atrium: Left atrium is severely dilated.    Right Atrium: Right atrium is mildly dilated.    Mitral Valve: There is mild regurgitation.    Tricuspid Valve: There is mild to moderate regurgitation.    IVC/SVC: Normal venous pressure at 3 mmHg.    Pericardium: There is a moderate effusion. No indication of cardiac   tamponade. Evidence includes no IVC dilation.        Result Date: 7/16/2024    Left Ventricle: Left ventricle was not well visualized due to poor   sonic window. The left ventricle is moderately dilated. Moderately   increased ventricular mass. Normal wall thickness. There is moderate   asymmetric hypertrophy. Regional wall motion abnormalities present. Mid   posterior and mid inferior wall segments most " notably. There is moderately   reduced systolic function with a visually estimated ejection fraction of   30 - 40%. Unable to assess diastolic function due to atrial fibrillation.   LV apical cavity obstruction at rest. Complete obliteration of the cavity.    Right Ventricle: Moderate right ventricular enlargement. Systolic   function is moderately reduced.    Left Atrium: Left atrium is severely dilated.    Right Atrium: Right atrium is mildly dilated.    Mitral Valve: There is mild regurgitation.    Tricuspid Valve: There is mild to moderate regurgitation.    IVC/SVC: Normal venous pressure at 3 mmHg.    Pericardium: There is a moderate effusion. No indication of cardiac   tamponade. Evidence includes no IVC dilation.          Primary hypertension  Chronic, controlled. Latest blood pressure and vitals reviewed-     Temp:  [98 °F (36.7 °C)-98.4 °F (36.9 °C)]   Pulse:  [52-65]   Resp:  [14-18]   BP: (114-175)/(67-93)   SpO2:  [94 %-98 %] .   Home meds for hypertension were reviewed and noted below.   Hypertension Medications               amLODIPine (NORVASC) 5 MG tablet Take 1 tablet (5 mg total) by mouth once daily.    carvediloL (COREG) 12.5 MG tablet Take 1 tablet (12.5 mg total) by mouth 2 (two) times daily.            While in the hospital, will manage blood pressure as follows; Continue home antihypertensive regimen    Will utilize p.r.n. blood pressure medication only if patient's blood pressure greater than 140/90 and he develops symptoms such as worsening chest pain or shortness of breath.    Hyperlipidemia  Continue statin.        GI  [place] grade C esophagitis  Continue PPI        Final Active Diagnoses:    Diagnosis Date Noted POA    PRINCIPAL PROBLEM:  Atrial fibrillation with RVR [I48.91] 07/16/2024 Yes    Chronic combined systolic and diastolic heart failure [I50.42] 07/17/2024 Yes    Tricuspid regurgitation [I07.1]  Yes    [place] grade C esophagitis [K20.80] 05/09/2023 Yes    Primary  hypertension [I10]  Yes     Chronic    Hyperlipidemia [E78.5]  Yes      Problems Resolved During this Admission:    Diagnosis Date Noted Date Resolved POA    Heart failure with reduced ejection fraction [I50.20] 07/17/2024 07/17/2024 Yes       Discharged Condition: good    Disposition: Home or Self Care    Follow Up:   Follow-up Information       Ruthy Davis ACNP Follow up on 7/31/2024.    Specialty: Cardiology  Why: Appointment scheduled with VICTORIA Davis on 7/31/2024 at 10:45am  Contact information:  [address] [facility]  [place] [address]  [phone]               Juli Zapata FNP. Schedule an appointment as soon as possible for a visit in 1 week(s).    Specialty: Family Medicine  Contact information:  [address] [facility] [place] [address]  [phone]                           Patient Instructions:      Diet Cardiac     Activity as tolerated       Significant Diagnostic Studies: Labs: All labs within the past 24 hours have been reviewed    Pending Diagnostic Studies:       Procedure Component Value Units Date/Time    EKG 12-lead [[id number]]     Order Status: Sent Lab Status: No result            Medications:  Reconciled Home Medications:      Medication List        START taking these medications      losartan 25 MG tablet  Commonly known as: COZAAR  Take 1 tablet (25 mg total) by mouth once daily.     spironolactone 25 MG tablet  Commonly known as: ALDACTONE  Take 0.5 tablets (12.5 mg total) by mouth once daily.            CONTINUE taking these medications      aspirin 81 MG EC tablet  Commonly known as: ECOTRIN  Take 81 mg by mouth once daily.     carvediloL 12.5 MG tablet  Commonly known as: COREG  Take 1 tablet (12.5 mg total) by mouth 2 (two) times daily.     fish oil-omega-3 fatty acids 300-1,000 mg capsule  Take 1 capsule by mouth once daily.     pantoprazole 40 MG tablet  Commonly known as: PROTONIX  TAKE 1 TABLET DAILY      rosuvastatin 10 MG tablet  Commonly known as: CRESTOR  Take 1 tablet (10 mg total) by mouth once daily.     VITAMIN D3 50 mcg (2,000 unit) Cap capsule  Generic drug: cholecalciferol (vitamin D3)  Take by mouth once daily.            STOP taking these medications      amLODIPine 5 MG tablet  Commonly known as: NORVASC              Indwelling Lines/Drains at time of discharge:   Lines/Drains/Airways       None                   Time spent on the discharge of patient: >30 minutes         Fercho Chavez DO  Department of Hospital Medicine  [facility] - [facility] Tele[facility]

## 2024-07-17 NOTE — ASSESSMENT & PLAN NOTE
"Patient is identified as having Combined Systolic and Diastolic heart failure that is Chronic. CHF is currently controlled. Latest ECHO performed and demonstrates- Results for orders placed during the hospital encounter of 07/16/24    Echo    Interpretation Summary    Left Ventricle: Left ventricle was not well visualized due to poor sonic window. The left ventricle is moderately dilated. Moderately increased ventricular mass. Normal wall thickness. There is concentric hypertrophy. Regional wall motion abnormalities present. Mid posterior and mid inferior wall segments most notably. There is severely reduced systolic function. Ejection fraction by visual approximation is 35%. There is diastolic dysfunction but grade cannot be determined. LV apical cavity obstruction at rest. Complete obliteration of the cavity.    Right Ventricle: Moderate right ventricular enlargement. Systolic function is severely reduced.    Left Atrium: Left atrium is severely dilated.    Right Atrium: Right atrium is mildly dilated.    Mitral Valve: There is mild regurgitation.    Tricuspid Valve: There is mild to moderate regurgitation.    IVC/SVC: Normal venous pressure at 3 mmHg.    Pericardium: There is a moderate effusion. No indication of cardiac tamponade. Evidence includes no IVC dilation.  . Continue Beta Blocker, ACE/ARB, and Aldactone and monitor clinical status closely. Monitor on telemetry. Patient is off CHF pathway.  Monitor strict Is&Os and daily weights.  Place on fluid restriction of 2 L. Cardiology has been consulted. Continue to stress to patient importance of self efficacy and  on diet for CHF. Last BNP reviewed- and noted below No results for input(s): "BNP", "BNPTRIAGEBLO" in the last 168 hours.      "

## 2024-07-17 NOTE — ASSESSMENT & PLAN NOTE
Amiodarone infusion started.  Patient appears euvolemic.    Will continue his coreg at 12.5.  He has had problem with bradycardia before as well.    2D echo pending and will monitor closely on telemetry.    Will trend trop and follow lipid and A1C results.    Follow up Cardiology outpatient

## 2024-07-18 ENCOUNTER — PATIENT OUTREACH (OUTPATIENT)
Dept: ADMINISTRATIVE | Facility: CLINIC | Age: 79
End: 2024-07-18

## 2024-07-18 NOTE — PROGRESS NOTES
C3 nurse attempted to contact Ruben Alarcon  for a TCC post hospital discharge follow up call. No answer. Left voicemail with callback information. The patient does not have a scheduled HOSFU appointment. Message sent to PCP staff for assistance with scheduling visit with patient.

## 2024-07-19 PROBLEM — I48.0 PAROXYSMAL ATRIAL FIBRILLATION: Status: ACTIVE | Noted: 2024-07-19

## 2024-07-19 NOTE — CONSULTS
"[facility] Cardiology Consult      Consultant Attending:Vinnie Trotter    Reason for Consult:     AFib with RVR    Subjective:      History of Present Illness:  Ruben Alarcon is a 78 y.o.  male who  has a past medical history of Atrial fibrillation with slow ventricular response, COVID-19 (11/01/2021), Hiatal hernia with gastroesophageal reflux disease and esophagitis, Hyperlipidemia, Neon grade C esophagitis (05/09/2023), Malignant neoplasm of overlapping sites of bladder (04/10/2024), Malignant neoplasm of prostate (03/28/2024), Primary hypertension, and Tricuspid regurgitation.. The patient presented to [facility] on 7/16/2024 with a primary complaint of Chest Pain and Tachycardia        HPI  Patient was initially found to be in AF w/ RVR and was started on IV amiodarone after which he converted to NSR. Cardiology discussed with patient who has had prior PVI for his AF and stated 'it was the chocolate - it's one of my triggers, every time I eat chocolate I go into AFib and my wife bought me some chocolate which I ate."       A/p    79yo m pw AF w RVR; s/p cardioversion on amio gtt;     AFw RVR  plan discharge on coreg (changed from metoprolol) - no plan to continue amio; denied all other cardiac symptoms/complaints including CP/sob/orthopnea/PND.     HTN  He was noted to be moderately hypertensive and he was discharged on carvedilol 3.125mg PO BID (changed from metoprolol succinate).     Follow-up:  W/ cardiology NP within 1-2 weeks of discharge, then his cardiologist or myself within next 1 month    Results for orders placed during the hospital encounter of 07/16/24    Echo    Interpretation Summary    Left Ventricle: Left ventricle was not well visualized due to poor sonic window. The left ventricle is moderately dilated. Moderately increased ventricular mass. Normal wall thickness. There is concentric hypertrophy. Regional wall motion abnormalities present. Mid posterior and mid " inferior wall segments most notably. There is severely reduced systolic function. Ejection fraction by visual approximation is 35%. There is diastolic dysfunction but grade cannot be determined. LV apical cavity obstruction at rest. Complete obliteration of the cavity.    Right Ventricle: Moderate right ventricular enlargement. Systolic function is severely reduced.    Left Atrium: Left atrium is severely dilated.    Right Atrium: Right atrium is mildly dilated.    Mitral Valve: There is mild regurgitation.    Tricuspid Valve: There is mild to moderate regurgitation.    IVC/SVC: Normal venous pressure at 3 mmHg.    Pericardium: There is a moderate effusion. No indication of cardiac tamponade. Evidence includes no IVC dilation.     Past Medical History:  Past Medical History:   Diagnosis Date    Atrial fibrillation with slow ventricular response     COVID-19 11/01/2021    Hiatal hernia with gastroesophageal reflux disease and esophagitis     Hyperlipidemia     [place] grade C esophagitis 05/09/2023    Malignant neoplasm of overlapping sites of bladder 04/10/2024    Per records from [facility] received 04/10/2023:  Right posterior bladder mass on MRI prostate 06/14/2023.       CT urogram obtain 06/21/2023 with 1.6 cm polypoid intraluminal filling defect along the posterior lateral urinary bladder wall highly suspicious for transitional cell carcinoma.     Cystoscopy on 06/22/2023 showed a 3 cm papillary right bladder wall lesion.     TURBT > 5 cm perf    Malignant neoplasm of prostate 03/28/2024 July 2023 diagnosed per Dr. Filiberto Garcia.  Completed radiation 10/19/2023.      Primary hypertension     Tricuspid regurgitation     follows with Dr. Ma       Past Surgical History:  Past Surgical History:   Procedure Laterality Date    CARDIAC ELECTROPHYSIOLOGY MAPPING AND ABLATION      Dr. Casimiro Mazariegos at [facility]    CATARACT EXTRACTION, BILATERAL      CLOSURE OF LEFT ATRIAL APPENDAGE USING DEVICE       Watchman Procedure    SHOULDER SURGERY Left     TONSILLECTOMY         Allergies:  Review of patient's allergies indicates:  No Known Allergies    Medications:   In-Hospital Scheduled Medications:     In-Hospital PRN Medications:     In-Hospital IV Infusion Medications:     Home Medications:  Prior to Admission medications    Medication Sig Start Date End Date Taking? Authorizing Provider   aspirin (ECOTRIN) 81 MG EC tablet Take 81 mg by mouth once daily. 6/7/24  Yes Provider, Historical   cholecalciferol, vitamin D3, (VITAMIN D3) 50 mcg (2,000 unit) Cap capsule Take by mouth once daily.   Yes Provider, Historical   omega-3 fatty acids/fish oil (FISH OIL-OMEGA-3 FATTY ACIDS) 300-1,000 mg capsule Take 1 capsule by mouth once daily.   Yes Provider, Historical   pantoprazole (PROTONIX) 40 MG tablet TAKE 1 TABLET DAILY 2/29/24  Yes Mina Chavez MD   rosuvastatin (CRESTOR) 10 MG tablet Take 1 tablet (10 mg total) by mouth once daily. 5/6/24 5/6/25 Yes Juli Zapata FNP   carvediloL (COREG) 12.5 MG tablet TAKE 1 TABLET TWICE A DAY 7/17/24   Ruthy Davis ACNP   losartan (COZAAR) 25 MG tablet Take 1 tablet (25 mg total) by mouth once daily. 7/17/24 7/17/25  Fercho Chavez DO   spironolactone (ALDACTONE) 25 MG tablet Take 0.5 tablets (12.5 mg total) by mouth once daily. 7/17/24 7/17/25  Fercho Chavez DO       Family History:  Family History   Problem Relation Name Age of Onset    Hypertension Mother      Hypertension Father      Cancer Sister      Leukemia Sister      Cancer Brother      Prostate cancer Brother      Cancer Brother      Bladder Cancer Brother         Social History:  Social History     Tobacco Use    Smoking status: Never     Passive exposure: Never    Smokeless tobacco: Never   Substance Use Topics    Alcohol use: Never    Drug use: Never       Review of Systems:  All other systems are reviewed and are negative except for those mentioned in HPI and A/P.    Objective:   Last 24 Hour Vital  "Signs:  No data recorded  I/O last 3 completed shifts:  In: 348.7 [P.O.:120; I.V.:128.5; IV Piggyback:100.3]  Out: -   Body mass index is 25.02 kg/m².    Physical Examination:  Gen: NAD  HEENT: NC/AT  CV: RRR  Lungs: coarse BS  Abd: NTTP  Ext: AT  Neuro: CNGI  Psych: AMAA  Skin: no rash    Laboratory Results:  Most Recent Data:  CBC:   Lab Results   Component Value Date    WBC 7.52 07/16/2024    HGB 11.5 (L) 07/16/2024    HCT 35.2 (L) 07/16/2024     07/16/2024    MCV 92.6 07/16/2024    RDW 13.4 07/16/2024    DIFFTYPE Auto 07/16/2024     BMP:   Lab Results   Component Value Date     07/16/2024    K 3.5 07/16/2024     (H) 07/16/2024    CO2 26 07/16/2024    BUN 22 (H) 07/16/2024     (H) 07/16/2024    CALCIUM 8.7 07/16/2024    MG 2.1 07/16/2024     LFTs:   Lab Results   Component Value Date    PROT 6.8 07/16/2024    ALBUMIN 3.4 (L) 07/16/2024    BILITOT 0.2 07/16/2024    AST 18 07/16/2024    ALKPHOS 65 07/16/2024    ALT 26 07/16/2024     Coags:   Lab Results   Component Value Date    INR 0.99 07/16/2024     FLP:   Lab Results   Component Value Date    CHOL 114 07/16/2024    HDL 36 (L) 07/16/2024    LDLCALC 49 07/16/2024    TRIG 145 07/16/2024    CHOLHDL 3.2 07/16/2024     DM:   Lab Results   Component Value Date    HGBA1C 5.7 07/16/2024    HGBA1C 5.7 05/01/2024    LDLCALC 49 07/16/2024    CREATININE 1.16 07/16/2024     Thyroid:   Lab Results   Component Value Date    TSH 3.590 07/16/2024     Anemia:   Lab Results   Component Value Date    IRON 88 05/01/2024    TIBC 296 05/01/2024    FERRITIN 46 05/01/2024    UBHQGXDX53 334 05/01/2024    FOLATE >20.0 (H) 05/01/2024     Cardiac: No results found for: "TROPONINI", "CKTOTAL", "CKMB", "BNP"  Urinalysis:   Lab Results   Component Value Date    LABURIN No Growth 04/25/2022    COLORU Red (A) 04/22/2022    SPECGRAV 1.025 04/22/2022    NITRITE Positive (A) 04/22/2022    KETONESU Trace 04/22/2022    UROBILINOGEN 1.0 04/22/2022    WBCUA 0-5 04/22/2022 " "      Trended Lab Data:  Recent Labs   Lab 07/16/24  0305   WBC 7.52   HGB 11.5*   HCT 35.2*      MCV 92.6   RDW 13.4      K 3.5   *   CO2 26   BUN 22*   *   CALCIUM 8.7   PROT 6.8   ALBUMIN 3.4*   BILITOT 0.2   AST 18   ALKPHOS 65   ALT 26         Trended Cardiac Data:  No results for input(s): "TROPONINI", "CKTOTAL", "CKMB", "BNP" in the last 168 hours.    Radiology:  Echo    Addendum Date: 7/16/2024      Left Ventricle: Left ventricle was not well visualized due to poor sonic window. The left ventricle is moderately dilated. Moderately increased ventricular mass. Normal wall thickness. There is concentric hypertrophy. Regional wall motion abnormalities present. Mid posterior and mid inferior wall segments most notably. There is severely reduced systolic function. Ejection fraction by visual approximation is 35%. There is diastolic dysfunction but grade cannot be determined. LV apical cavity obstruction at rest. Complete obliteration of the cavity.   Right Ventricle: Moderate right ventricular enlargement. Systolic function is severely reduced.   Left Atrium: Left atrium is severely dilated.   Right Atrium: Right atrium is mildly dilated.   Mitral Valve: There is mild regurgitation.   Tricuspid Valve: There is mild to moderate regurgitation.   IVC/SVC: Normal venous pressure at 3 mmHg.   Pericardium: There is a moderate effusion. No indication of cardiac tamponade. Evidence includes no IVC dilation.     Result Date: 7/16/2024    Left Ventricle: Left ventricle was not well visualized due to poor sonic window. The left ventricle is moderately dilated. Moderately increased ventricular mass. Normal wall thickness. There is moderate asymmetric hypertrophy. Regional wall motion abnormalities present. Mid posterior and mid inferior wall segments most notably. There is moderately reduced systolic function with a visually estimated ejection fraction of 30 - 40%. Unable to assess diastolic function " due to atrial fibrillation. LV apical cavity obstruction at rest. Complete obliteration of the cavity.   Right Ventricle: Moderate right ventricular enlargement. Systolic function is moderately reduced.   Left Atrium: Left atrium is severely dilated.   Right Atrium: Right atrium is mildly dilated.   Mitral Valve: There is mild regurgitation.   Tricuspid Valve: There is mild to moderate regurgitation.   IVC/SVC: Normal venous pressure at 3 mmHg.   Pericardium: There is a moderate effusion. No indication of cardiac tamponade. Evidence includes no IVC dilation.     X-Ray Chest AP Portable    Result Date: 7/16/2024  EXAMINATION: XR CHEST AP PORTABLE CLINICAL HISTORY: Chest pain, unspecified COMPARISON: 19 February 2019 TECHNIQUE: XR CHEST AP PORTABLE FINDINGS: The heart and mediastinum are normal in size and configuration.  The pulmonary vascularity is normal in caliber.  No lung infiltrates, effusions, pneumothorax or other abnormality is demonstrated.     No evidence of cardiopulmonary disease. Electronically signed by: Jose Crespo Date:    07/16/2024 Time:    07:44      Reviewed all available cardiac studies pertinent to this case personally.      Assessment:     Ruben Alarcon is a 78 y.o. male with:  Present on Admission:   Primary hypertension   Hyperlipidemia   [place] grade C esophagitis   Tricuspid regurgitation   Atrial fibrillation with RVR   (Resolved) Heart failure with reduced ejection fraction   Chronic combined systolic and diastolic heart failure       Recommendations:     See below HPI.  Advised to avoid chocolate and other triggers.  AF w/ RVR cardioverted on amio gtt; completed 24h load/drip; did not continue PO amio at discharge  Changed his home metoprolol to carvedilol 3.125mg PO BID  F/u w/ cardiology NP, EP, his cardiologist or myself in clinic in next 1wk, 1 mo respectively        Thank you for allowing us to participate in the care of this patient. Please contact me via secure chat if  you have any questions regarding this consult.    Vinnie Trotter  [facility]  Interventional Cardiology

## 2024-07-19 NOTE — PROGRESS NOTES
C3 nurse spoke with Ruben Alarcon  for a TCC post hospital discharge follow up call. The patient has a scheduled HOSFU appointment with Juli Zapata FNP  on 7/24/24 @ 1000.          yes

## 2024-07-21 LAB
OHS QRS DURATION: 100 MS
OHS QRS DURATION: 102 MS
OHS QRS DURATION: 102 MS
OHS QRS DURATION: 104 MS
OHS QRS DURATION: 120 MS
OHS QTC CALCULATION: 419 MS
OHS QTC CALCULATION: 431 MS
OHS QTC CALCULATION: 432 MS
OHS QTC CALCULATION: 460 MS
OHS QTC CALCULATION: 471 MS

## 2024-07-24 ENCOUNTER — OFFICE VISIT (OUTPATIENT)
Dept: FAMILY MEDICINE | Facility: CLINIC | Age: 79
End: 2024-07-24
Payer: MEDICARE

## 2024-07-24 VITALS
HEART RATE: 60 BPM | HEIGHT: 66 IN | DIASTOLIC BLOOD PRESSURE: 74 MMHG | OXYGEN SATURATION: 98 % | SYSTOLIC BLOOD PRESSURE: 138 MMHG | BODY MASS INDEX: 24.94 KG/M2 | RESPIRATION RATE: 18 BRPM | TEMPERATURE: 98 F | WEIGHT: 155.19 LBS

## 2024-07-24 DIAGNOSIS — I10 PRIMARY HYPERTENSION: Chronic | ICD-10-CM

## 2024-07-24 DIAGNOSIS — I50.42 CHRONIC COMBINED SYSTOLIC AND DIASTOLIC HEART FAILURE: ICD-10-CM

## 2024-07-24 DIAGNOSIS — E78.49 OTHER HYPERLIPIDEMIA: Chronic | ICD-10-CM

## 2024-07-24 DIAGNOSIS — I48.0 PAROXYSMAL ATRIAL FIBRILLATION: Primary | ICD-10-CM

## 2024-07-24 PROBLEM — I48.91 ATRIAL FIBRILLATION WITH RVR: Status: RESOLVED | Noted: 2024-07-16 | Resolved: 2024-07-24

## 2024-07-24 PROCEDURE — 99496 TRANSJ CARE MGMT HIGH F2F 7D: CPT | Mod: ,,, | Performed by: NURSE PRACTITIONER

## 2024-07-24 NOTE — ASSESSMENT & PLAN NOTE
Rate now controlled, f/u with Cardiology as scheduled 07/31/2024.  Radiation ended 10/19/24.  Had Watchman procedure Jan 2024 and as a result is now off Eliquis.

## 2024-07-24 NOTE — PROGRESS NOTES
[facility] - FAMILY MEDICINE       PATIENT NAME: Ruben Alarcon   : 1945    AGE: 78 y.o. DATE OF ENCOUNTER: 24    MRN: [id number]      PCP: Juli Zapata FNP    Reason for Visit / Chief Complaint:  Transitional Care (Patient reports to the clinic for [facility] Follow up.) and Health Maintenance (Care gaps addressed, patient declines all vaccines today.//Minda Quick CMA)         274}    Subjective:     HPI:    Ruben Alarcon presents for a Transitional Care Management hospital discharge follow-up visit. He was admitted to [facility] hospital on 2024 for chest tightness secondary to AFib with RVR with rate of 170.  For CHF, started on losartan and Aldactone in addition to Coreg, but today reports he is not taking either new medication because he has not received them yet from mail order pharmacy. He was discharged on 2024.  Cardiology f/u scheduled 2024.  Had eaten excessive amt of chocolate and cherries prior to onset of AF w/ RVR.    BP today is initially quite elevated, but improved after a period of rest    Family and/or Caretaker present at visit?  No.  Diagnostic tests reviewed/disposition: No diagnosic tests pending after this hospitalization.  Home health/community services discussion/referrals: Patient does not have home health established from hospital visit.  They do not need home health.  If needed, we will set up home health for the patient.   Establishment or re-establishment of referral orders for community resources: No other necessary community resources.   Discussion with other health care providers: No discussion with other health care providers necessary.     Discharge and current medications have been reconciled.    Review of Systems:   Review of Systems   Constitutional: Negative.    HENT: Negative.     Eyes: Negative.    Respiratory: Negative.     Cardiovascular: Negative.    Gastrointestinal: Negative.  Negative for blood  in stool.   Endocrine: Negative.    Genitourinary: Negative.    Musculoskeletal: Negative.    Skin: Negative.    Allergic/Immunologic: Negative.    Neurological: Negative.    Hematological: Negative.    Psychiatric/Behavioral: Negative.         Allergies and Meds: 274}   Review of patient's allergies indicates:  No Known Allergies     Current Outpatient Medications:     aspirin (ECOTRIN) 81 MG EC tablet, Take 81 mg by mouth once daily., Disp: , Rfl:     carvediloL (COREG) 12.5 MG tablet, TAKE 1 TABLET TWICE A DAY, Disp: 180 tablet, Rfl: 1    cholecalciferol, vitamin D3, (VITAMIN D3) 50 mcg (2,000 unit) Cap capsule, Take by mouth once daily., Disp: , Rfl:     omega-3 fatty acids/fish oil (FISH OIL-OMEGA-3 FATTY ACIDS) 300-1,000 mg capsule, Take 1 capsule by mouth once daily., Disp: , Rfl:     pantoprazole (PROTONIX) 40 MG tablet, TAKE 1 TABLET DAILY, Disp: 90 tablet, Rfl: 3    rosuvastatin (CRESTOR) 10 MG tablet, Take 1 tablet (10 mg total) by mouth once daily., Disp: 90 tablet, Rfl: 3    spironolactone (ALDACTONE) 25 MG tablet, Take 0.5 tablets (12.5 mg total) by mouth once daily., Disp: 15 tablet, Rfl: 11    losartan (COZAAR) 25 MG tablet, Take 1 tablet (25 mg total) by mouth once daily., Disp: 90 tablet, Rfl: 3    Labs:274}    I have reviewed old labs below:  Lab Results   Component Value Date    WBC 7.52 07/16/2024    RBC 3.80 (L) 07/16/2024    HGB 11.5 (L) 07/16/2024    HCT 35.2 (L) 07/16/2024     07/16/2024     07/16/2024    K 3.5 07/16/2024     (H) 07/16/2024    CALCIUM 8.7 07/16/2024     (H) 07/16/2024    BUN 22 (H) 07/16/2024    CREATININE 1.16 07/16/2024    ALT 26 07/16/2024    AST 18 07/16/2024    INR 0.99 07/16/2024    CHOL 114 07/16/2024    TRIG 145 07/16/2024    HDL 36 (L) 07/16/2024    LDLCALC 49 07/16/2024    TSH 3.590 07/16/2024    PSA 10.400 (H) 03/02/2023    HGBA1C 5.7 07/16/2024       Medical History: 274}     Past Medical History:   Diagnosis Date    Atrial fibrillation  "with slow ventricular response     COVID-19 11/01/2021    Hiatal hernia with gastroesophageal reflux disease and esophagitis     Hyperlipidemia     [place] grade C esophagitis 05/09/2023    Malignant neoplasm of overlapping sites of bladder 04/10/2024    Per records from [facility] received 04/10/2023:  Right posterior bladder mass on MRI prostate 06/14/2023.       CT urogram obtain 06/21/2023 with 1.6 cm polypoid intraluminal filling defect along the posterior lateral urinary bladder wall highly suspicious for transitional cell carcinoma.     Cystoscopy on 06/22/2023 showed a 3 cm papillary right bladder wall lesion.     TURBT > 5 cm perf    Malignant neoplasm of prostate 03/28/2024 July 2023 diagnosed per Dr. Filiberto Garcia.  Completed radiation 10/19/2023.      Primary hypertension     Tricuspid regurgitation     follows with Dr. Ma      Social History     Tobacco Use   Smoking Status Never    Passive exposure: Never   Smokeless Tobacco Never      Past Surgical History:   Procedure Laterality Date    CARDIAC ELECTROPHYSIOLOGY MAPPING AND ABLATION      Dr. Casimiro Mazariegos at [facility]    CATARACT EXTRACTION, BILATERAL      CLOSURE OF LEFT ATRIAL APPENDAGE USING DEVICE      Watchman Procedure    SHOULDER SURGERY Left     TONSILLECTOMY        Objective:  274}   Vital Signs  Temp: 97.7 °F (36.5 °C)  Temp Source: Oral  Pulse: 60  Resp: 18  SpO2: 98 %  BP: 138/74  BP Location: Left arm  Patient Position: Sitting  Pain Score: 0-No pain  Height and Weight  Height: 5' 6" (167.6 cm)  Weight: 70.4 kg (155 lb 3.2 oz)  BSA (Calculated - sq m): 1.81 sq meters  BMI (Calculated): 25.1  Weight in (lb) to have BMI = 25: 154.6    Over the last two weeks how often have you been bothered by little interest or pleasure in doing things: 0  Over the last two weeks how often have you been bothered by feeling down, depressed or hopeless: 0  PHQ-2 Total Score: 0  PHQ-9 Score: 0  PHQ-9 Interpretation: Minimal or None    Wt " Readings from Last 3 Encounters:   07/24/24 70.4 kg (155 lb 3.2 oz)   07/16/24 70.3 kg (155 lb)   05/01/24 69 kg (152 lb 3.2 oz)       Physical Exam  Vitals and nursing note reviewed.   Constitutional:       General: He is not in acute distress.     Appearance: Normal appearance. He is not ill-appearing or diaphoretic.   HENT:      Head: Normocephalic.   Eyes:      Conjunctiva/sclera: Conjunctivae normal.   Cardiovascular:      Rate and Rhythm: Normal rate and regular rhythm.      Heart sounds: Normal heart sounds.   Pulmonary:      Effort: Pulmonary effort is normal. No respiratory distress.      Breath sounds: Normal breath sounds. No wheezing, rhonchi or rales.   Musculoskeletal:      Cervical back: Neck supple.      Right lower leg: No edema.      Left lower leg: No edema.   Skin:     General: Skin is warm and dry.   Neurological:      Mental Status: He is alert and oriented to person, place, and time.          Assessment and Plan: 274}     1. Paroxysmal atrial fibrillation  Assessment & Plan:  Rate now controlled, f/u with Cardiology as scheduled 07/31/2024.  Radiation ended 10/19/24.  Had Watchman procedure Jan 2024 and as a result is now off Eliquis.      2. Chronic combined systolic and diastolic heart failure  Assessment & Plan:  EF 35%  Follow-up with cardiology as scheduled 7/31/24.    Amlodipine 5 mg was dc but states he is still taking it because he hasn't received losartan or spironolactone yet.  Continue coreg.  Needs to start losartan and spironolactone.      3. Primary hypertension  Assessment & Plan:  Not controlled     7/24/2024 10:14 AM 7/24/2024 10:22 AM 7/24/2024   11:08 AM   /90 175/92 138/74     Amlodipine 5 mg was to be stopped at hospital dc but states he is still taking it because he hasn't received losartan or spironolactone yet.  Continue coreg.    Needs to start losartan and spironolactone.      4. Other hyperlipidemia  Assessment & Plan:  Controlled with  rosuvastatin.          Diagnosis, risks, benefits, and side effects of meds and treatment plan were discussed with the patient.  Patient was advised to call or f/u with any new or worsening symptoms or problems prior to next appointment.  Go to ER for any urgent complications.  All questions were answered to the satisfaction of the patient, and pt verbalized understanding and agreement with treatment plan.      Follow up in about 15 days (around 8/8/2024) for hypertension.    Signature:  ULYSSES Gabriel    Future Appointments   Date Time Provider Department Center   7/31/2024 10:45 AM Ruthy Davis ACNP [facility]   8/8/2024  9:20 AM Juli Zapata FNP [facility]   10/1/2024  8:30 AM Ruthy [facility] ACNP [facility]   11/15/2024 10:00 AM Mecca Hylton MD [facility]   4/3/2025  1:00 PM AWV NURSE, [facility] FAMILY MEDICINE [facility]   5/5/2025  9:40 AM Juli Zapata FNP [facility]

## 2024-07-24 NOTE — ASSESSMENT & PLAN NOTE
Not controlled     7/24/2024 10:14 AM 7/24/2024 10:22 AM 7/24/2024   11:08 AM   /90 175/92 138/74     Amlodipine 5 mg was to be stopped at hospital HI but states he is still taking it because he hasn't received losartan or spironolactone yet.  Continue coreg.    Needs to start losartan and spironolactone.

## 2024-07-24 NOTE — ASSESSMENT & PLAN NOTE
EF 35%  Follow-up with cardiology as scheduled 7/31/24.    Amlodipine 5 mg was dc but states he is still taking it because he hasn't received losartan or spironolactone yet.  Continue coreg.  Needs to start losartan and spironolactone.

## 2024-07-28 NOTE — PHYSICIAN QUERY
"Please specify the type of atrial fibrillation:    To respond, click "New Note" select your response press enter then sign to complete the query       Unspecified Atrial Fibrillation      "

## 2024-07-31 ENCOUNTER — OFFICE VISIT (OUTPATIENT)
Dept: CARDIOLOGY | Facility: CLINIC | Age: 79
End: 2024-07-31
Payer: MEDICARE

## 2024-07-31 VITALS
HEIGHT: 66 IN | OXYGEN SATURATION: 98 % | HEART RATE: 72 BPM | DIASTOLIC BLOOD PRESSURE: 84 MMHG | SYSTOLIC BLOOD PRESSURE: 140 MMHG | BODY MASS INDEX: 24.44 KG/M2 | WEIGHT: 152.06 LBS

## 2024-07-31 DIAGNOSIS — I42.9 CARDIOMYOPATHY, UNSPECIFIED TYPE: Primary | ICD-10-CM

## 2024-07-31 DIAGNOSIS — I48.0 PAROXYSMAL ATRIAL FIBRILLATION: Chronic | ICD-10-CM

## 2024-07-31 DIAGNOSIS — I10 PRIMARY HYPERTENSION: Chronic | ICD-10-CM

## 2024-07-31 DIAGNOSIS — I48.91 ATRIAL FIBRILLATION, UNSPECIFIED TYPE: ICD-10-CM

## 2024-07-31 DIAGNOSIS — I50.42 CHRONIC COMBINED SYSTOLIC AND DIASTOLIC HEART FAILURE: Chronic | ICD-10-CM

## 2024-07-31 PROCEDURE — 93010 ELECTROCARDIOGRAM REPORT: CPT | Mod: S$PBB,,, | Performed by: INTERNAL MEDICINE

## 2024-07-31 PROCEDURE — 99214 OFFICE O/P EST MOD 30 MIN: CPT | Mod: PBBFAC,25 | Performed by: NURSE PRACTITIONER

## 2024-07-31 PROCEDURE — 99999 PR PBB SHADOW E&M-EST. PATIENT-LVL IV: CPT | Mod: PBBFAC,,, | Performed by: NURSE PRACTITIONER

## 2024-07-31 PROCEDURE — 93005 ELECTROCARDIOGRAM TRACING: CPT | Mod: PBBFAC | Performed by: INTERNAL MEDICINE

## 2024-07-31 PROCEDURE — 99215 OFFICE O/P EST HI 40 MIN: CPT | Mod: S$PBB,,, | Performed by: NURSE PRACTITIONER

## 2024-07-31 RX ORDER — CARVEDILOL 12.5 MG/1
12.5 TABLET ORAL 2 TIMES DAILY
Qty: 180 TABLET | Refills: 1 | Status: SHIPPED | OUTPATIENT
Start: 2024-07-31

## 2024-07-31 NOTE — PROGRESS NOTES
PCP: Juli Zapata FNP    Referring Provider:     Subjective:   Ruben Alarcon is a 78 y.o. male with hx of atrial fibrillation s/p ablation by Dr. Avery, HTN,  and upper GI bleed,  who presents for HD follow up. Patient had issues with a fib with RVR and ventricular rate in the 170s. He was admitted to [facility] from 7/16/2024-7/17/2024. He is s/p a fib ablation by Dr. Avery and a Watchman Implant as well.   During the admission, an echo demonstrated an LVEF of 35% with severe left atrial enlargement compared to recent echo at [facility] in 3/2024 which demonstrated LVEF of 61% and normal LA size.   The decline in LVEF is most likely r/t a fib with RVR. He was started on losartan and his coreg was continued. He was rx aldactone but there is a problem at the pharmacy and he has not received it yet.     3/27/2024 presents for routine follow up. He states that he is doing well and denies complaints.     9/27/2023 presents for routine follow up. He denies cardiac complaints.   The patient has recently been diagnosed with cancer and is having radiation therapy. Dr. Avery has recommended postponing the Watchman implant until after his radiation therapy has been completed.   3/27/2023 presents for follow up to discuss options for therapy after recent upper GI bleeding. He is now back on his Eliquis for CVA prophylaxis but would like to be referred to Dr. Avery again to be considered for the Watchman device.         Fhx:  Family History   Problem Relation Name Age of Onset    Hypertension Mother      Hypertension Father      Cancer Sister      Leukemia Sister      Cancer Brother      Prostate cancer Brother      Cancer Brother      Bladder Cancer Brother       Shx:   Social History     Socioeconomic History    Marital status:    Tobacco Use    Smoking status: Never     Passive exposure: Never    Smokeless tobacco: Never   Substance and Sexual Activity    Alcohol use: Never    Drug use: Never    Sexual  activity: Yes     Partners: Female     Social Determinants of Health     Financial Resource Strain: Low Risk  (7/23/2024)    Overall Financial Resource Strain (CARDIA)     Difficulty of Paying Living Expenses: Not hard at all   Food Insecurity: No Food Insecurity (7/23/2024)    Hunger Vital Sign     Worried About Running Out of Food in the Last Year: Never true     Ran Out of Food in the Last Year: Never true   Transportation Needs: No Transportation Needs (3/28/2024)    PRAPARE - Transportation     Lack of Transportation (Medical): No     Lack of Transportation (Non-Medical): No   Physical Activity: Inactive (7/23/2024)    Exercise Vital Sign     Days of Exercise per Week: 0 days     Minutes of Exercise per Session: 0 min   Stress: No Stress Concern Present (7/23/2024)    [employer] - Occupational Stress Questionnaire     Feeling of Stress : Not at all   Housing Stability: Low Risk  (3/28/2024)    Housing Stability Vital Sign     Unable to Pay for Housing in the Last Year: No     Number of Places Lived in the Last Year: 1     Unstable Housing in the Last Year: No       EKG   7/31/2024 RSR with sinus arrhythmia; HR 68 bpm; minimal voltage criteria for LVH; NSTWA; when compared with EKG 7/16/2024 no significant change was found.  7/16/2024 RSR with HR 63 bpm; normal EKG  7/16/2024 a fib with HR 90 bpm;   7/16/2024 a fib with RVR  bpm; incomplete RBBB  3/27/2024 RSR with marked sinus arrhythmia; HR 60 bpm; minimal voltage criteria for LVH; NSTWA  Results for orders placed or performed during the hospital encounter of 07/16/24   EKG 12-lead    Collection Time: 07/16/24 10:18 PM   Result Value Ref Range    QRS Duration 100 ms    [facility]HS QTC Calculation 460 ms    Narrative    Test Reason : I49.9,    Vent. Rate : 063 BPM     Atrial Rate : 063 BPM     P-R Int : 188 ms          QRS Dur : 100 ms      QT Int : 450 ms       P-R-T Axes : 066 -04 028 degrees     QTc Int : 460 ms    Normal sinus  rhythm  Normal ECG  When compared with ECG of 16-JUL-2024 03:48,  PREVIOUS ECG IS PRESENT  Confirmed by Shane Reid MD ([id number]) on 7/21/2024 1:07:19 AM    Referred By: [email or web address]   SELF           Confirmed By:Shane Reid MD            Lab Results   Component Value Date     07/16/2024    K 3.5 07/16/2024     (H) 07/16/2024    CO2 26 07/16/2024    BUN 22 (H) 07/16/2024    CREATININE 1.16 07/16/2024    CALCIUM 8.7 07/16/2024    ANIONGAP 10 07/16/2024       Lab Results   Component Value Date    CHOL 114 07/16/2024    CHOL 200 05/01/2024    CHOL 182 03/02/2023     Lab Results   Component Value Date    HDL 36 (L) 07/16/2024    HDL 50 05/01/2024    HDL 47 03/02/2023     Lab Results   Component Value Date    LDLCALC 49 07/16/2024    LDLCALC 130 05/01/2024    LDLCALC 119 03/02/2023     Lab Results   Component Value Date    TRIG 145 07/16/2024    TRIG 101 05/01/2024    TRIG 82 03/02/2023     Lab Results   Component Value Date    CHOLHDL 3.2 07/16/2024    CHOLHDL 4.0 05/01/2024    CHOLHDL 3.9 03/02/2023       Lab Results   Component Value Date    WBC 7.52 07/16/2024    HGB 11.5 (L) 07/16/2024    HCT 35.2 (L) 07/16/2024    MCV 92.6 07/16/2024     07/16/2024           Current Outpatient Medications:     aspirin (ECOTRIN) 81 MG EC tablet, Take 81 mg by mouth once daily., Disp: , Rfl:     cholecalciferol, vitamin D3, (VITAMIN D3) 50 mcg (2,000 unit) Cap capsule, Take by mouth once daily., Disp: , Rfl:     losartan (COZAAR) 25 MG tablet, Take 1 tablet (25 mg total) by mouth once daily., Disp: 90 tablet, Rfl: 3    omega-3 fatty acids/fish oil (FISH OIL-OMEGA-3 FATTY ACIDS) 300-1,000 mg capsule, Take 1 capsule by mouth once daily., Disp: , Rfl:     pantoprazole (PROTONIX) 40 MG tablet, TAKE 1 TABLET DAILY, Disp: 90 tablet, Rfl: 3    rosuvastatin (CRESTOR) 10 MG tablet, Take 1 tablet (10 mg total) by mouth once daily., Disp: 90 tablet, Rfl: 3    carvediloL (COREG) 12.5 MG tablet, Take 1 tablet (12.5 mg total) by  "mouth 2 (two) times daily., Disp: 180 tablet, Rfl: 1    spironolactone (ALDACTONE) 25 MG tablet, Take 0.5 tablets (12.5 mg total) by mouth once daily. (Patient not taking: Reported on 7/31/2024), Disp: 15 tablet, Rfl: 11  Meds reviewed and reconciled.      Review of Systems   Respiratory:  Negative for shortness of breath.    Cardiovascular:  Negative for chest pain, palpitations, orthopnea, claudication, leg swelling and PND.   Neurological:  Negative for dizziness, loss of consciousness and weakness.           Objective:   BP (!) 140/84 (BP Location: Left arm, Patient Position: Sitting)   Pulse 72   Ht 5' 6" (1.676 m)   Wt 69 kg (152 lb 1.3 oz)   SpO2 98%   BMI 24.55 kg/m²     Physical Exam  Vitals and nursing note reviewed.   Constitutional:       Appearance: Normal appearance. He is normal weight.   HENT:      Head: Normocephalic and atraumatic.   Neck:      Vascular: No carotid bruit.   Cardiovascular:      Rate and Rhythm: Normal rate and regular rhythm.      Pulses: Normal pulses.      Heart sounds: Normal heart sounds.   Pulmonary:      Effort: Pulmonary effort is normal.      Breath sounds: Normal breath sounds.   Abdominal:      Palpations: Abdomen is soft.   Musculoskeletal:      Cervical back: Neck supple.      Right lower leg: No edema.      Left lower leg: No edema.   Skin:     General: Skin is warm and dry.      Capillary Refill: Capillary refill takes less than 2 seconds.   Neurological:      Mental Status: He is alert.           Assessment:     1. Cardiomyopathy, unspecified type  Echo      2. Atrial fibrillation, unspecified type  carvediloL (COREG) 12.5 MG tablet    Echo    EKG 12-lead    EKG 12-lead    DECLINE - This patient qualifies for an outpatient referral to Electrophysiology. Click here to decline this recommendation.      3. Chronic combined systolic and diastolic heart failure        4. Paroxysmal atrial fibrillation        5. Primary hypertension              Plan:   Chronic " combined systolic and diastolic heart failure  Patient had issues with a fib with RVR and ventricular rate in the 170s. He was admitted to [facility] from 7/16/2024-7/17/2024. He is s/p a fib ablation by Dr. Avery and a Watchman Implant as well.   During the admission, an echo demonstrated an LVEF of 35% with severe left atrial enlargement compared to recent echo at [facility] in 3/2024 which demonstrated LVEF of 61% and normal LA size.   The decline in LVEF is most likely r/t a fib with RVR. He was started on losartan and his coreg was continued. He was rx aldactone but there is a problem at the pharmacy and he has not received it yet.   Repeat echo in 3 months    Paroxysmal atrial fibrillation  Patient had issues with a fib with RVR and ventricular rate in the 170s. He was admitted to [facility] from 7/16/2024-7/17/2024. He is s/p a fib ablation by Dr. Avery and a Watchman Implant as well.   During the admission, an echo demonstrated an LVEF of 35% with severe left atrial enlargement compared to recent echo at [facility] in 3/2024 which demonstrated LVEF of 61% and normal LA size.   The decline in LVEF is most likely r/t a fib with RVR.    Primary hypertension  140/84 mmHg      RTC: 3 months  His daughter will schedule an appointment with Dr. Avery.

## 2024-07-31 NOTE — ASSESSMENT & PLAN NOTE
Patient had issues with a fib with RVR and ventricular rate in the 170s. He was admitted to [facility] from 7/16/2024-7/17/2024. He is s/p a fib ablation by Dr. Avery and a Watchman Implant as well.   During the admission, an echo demonstrated an LVEF of 35% with severe left atrial enlargement compared to recent echo at [facility] in 3/2024 which demonstrated LVEF of 61% and normal LA size.   The decline in LVEF is most likely r/t a fib with RVR.

## 2024-07-31 NOTE — ASSESSMENT & PLAN NOTE
Patient had issues with a fib with RVR and ventricular rate in the 170s. He was admitted to [facility] from 7/16/2024-7/17/2024. He is s/p a fib ablation by Dr. Avery and a Watchman Implant as well.   During the admission, an echo demonstrated an LVEF of 35% with severe left atrial enlargement compared to recent echo at [facility] in 3/2024 which demonstrated LVEF of 61% and normal LA size.   The decline in LVEF is most likely r/t a fib with RVR. He was started on losartan and his coreg was continued. He was rx aldactone but there is a problem at the pharmacy and he has not received it yet.   Repeat echo in 3 months

## 2024-08-01 LAB
OHS QRS DURATION: 102 MS
OHS QTC CALCULATION: 444 MS

## 2024-08-26 ENCOUNTER — OFFICE VISIT (OUTPATIENT)
Dept: FAMILY MEDICINE | Facility: CLINIC | Age: 79
End: 2024-08-26
Payer: MEDICARE

## 2024-08-26 VITALS
TEMPERATURE: 98 F | HEIGHT: 66 IN | OXYGEN SATURATION: 98 % | WEIGHT: 154.19 LBS | DIASTOLIC BLOOD PRESSURE: 80 MMHG | BODY MASS INDEX: 24.78 KG/M2 | SYSTOLIC BLOOD PRESSURE: 148 MMHG | RESPIRATION RATE: 18 BRPM | HEART RATE: 60 BPM

## 2024-08-26 DIAGNOSIS — I50.42 CHRONIC COMBINED SYSTOLIC AND DIASTOLIC HEART FAILURE: Chronic | ICD-10-CM

## 2024-08-26 DIAGNOSIS — I10 PRIMARY HYPERTENSION: Primary | Chronic | ICD-10-CM

## 2024-08-26 PROCEDURE — 99214 OFFICE O/P EST MOD 30 MIN: CPT | Mod: ,,, | Performed by: NURSE PRACTITIONER

## 2024-08-26 RX ORDER — SPIRONOLACTONE 25 MG/1
12.5 TABLET ORAL DAILY
Qty: 45 TABLET | Refills: 3 | Status: SHIPPED | OUTPATIENT
Start: 2024-08-26 | End: 2025-08-26

## 2024-08-26 NOTE — ASSESSMENT & PLAN NOTE
Improved but still not controlled    Never received rx for spironolactone 12.5 mg - re-send rx as this is needed to improve BP control and for CHF.   Continue carvedilol 12.5 mg 2 times per day and losartan 25 mg daily.    Have nurse call him in 1 wk for remote BP reading.

## 2024-08-26 NOTE — PROGRESS NOTES
[facility]  [address] [place] [address]  Phone: [phone]  Fax: [phone]       PATIENT NAME: Ruben Alarcon   : 1945    AGE: 78 y.o. DATE OF ENCOUNTER: 24    MRN: [id number]      PCP: Juli Zapata FNP    Subjective:     Reason for Visit / Chief Complaint:     274}  Chief Complaint   Patient presents with    Hypertension     Patient reports to the clinic for 1 month follow up.    Health Maintenance     Care gaps addressed, patient declines all vaccines at this time.    Minda Quick, CMA     1 mth f/u uncontrolled HTN - still not taking spironolactone, never received it  Doing well, denies complaints.    Review of Systems:     Review of Systems   Constitutional: Negative.    HENT: Negative.     Eyes: Negative.    Respiratory: Negative.     Cardiovascular: Negative.    Gastrointestinal: Negative.  Negative for blood in stool.   Endocrine: Negative.    Genitourinary: Negative.    Musculoskeletal: Negative.    Skin: Negative.    Allergic/Immunologic: Negative.    Neurological: Negative.    Hematological: Negative.    Psychiatric/Behavioral: Negative.         Allergies and Meds: 274}     Review of patient's allergies indicates:  No Known Allergies     Current Outpatient Medications   Medication Sig Dispense Refill    aspirin (ECOTRIN) 81 MG EC tablet Take 81 mg by mouth once daily.      carvediloL (COREG) 12.5 MG tablet Take 1 tablet (12.5 mg total) by mouth 2 (two) times daily. 180 tablet 1    cholecalciferol, vitamin D3, (VITAMIN D3) 50 mcg (2,000 unit) Cap capsule Take by mouth once daily.      losartan (COZAAR) 25 MG tablet Take 1 tablet (25 mg total) by mouth once daily. 90 tablet 3    omega-3 fatty acids/fish oil (FISH OIL-OMEGA-3 FATTY ACIDS) 300-1,000 mg capsule Take 1 capsule by mouth once daily.      pantoprazole (PROTONIX) 40 MG tablet TAKE 1 TABLET DAILY 90 tablet 3    rosuvastatin (CRESTOR) 10 MG tablet Take 1 tablet (10 mg total) by  mouth once daily. 90 tablet 3    spironolactone (ALDACTONE) 25 MG tablet Take 0.5 tablets (12.5 mg total) by mouth once daily. 45 tablet 3     No current facility-administered medications for this visit.       Labs:274}   I have reviewed labs below:    Lab Results   Component Value Date    WBC 7.52 07/16/2024    RBC 3.80 (L) 07/16/2024    HGB 11.5 (L) 07/16/2024    HCT 35.2 (L) 07/16/2024     07/16/2024     07/16/2024    K 3.5 07/16/2024     (H) 07/16/2024    CALCIUM 8.7 07/16/2024     (H) 07/16/2024    BUN 22 (H) 07/16/2024    CREATININE 1.16 07/16/2024    ALT 26 07/16/2024    AST 18 07/16/2024    INR 0.99 07/16/2024    CHOL 114 07/16/2024    TRIG 145 07/16/2024    HDL 36 (L) 07/16/2024    LDLCALC 49 07/16/2024    TSH 3.590 07/16/2024    PSA 10.400 (H) 03/02/2023    HGBA1C 5.7 07/16/2024     Medical History: 274}     Past Medical History:   Diagnosis Date    Atrial fibrillation with slow ventricular response     COVID-19 11/01/2021    Hiatal hernia with gastroesophageal reflux disease and esophagitis     Hyperlipidemia     Addison grade C esophagitis 05/09/2023    Malignant neoplasm of overlapping sites of bladder 04/10/2024    Per records from [facility] received 04/10/2023:  Right posterior bladder mass on MRI prostate 06/14/2023.       CT urogram obtain 06/21/2023 with 1.6 cm polypoid intraluminal filling defect along the posterior lateral urinary bladder wall highly suspicious for transitional cell carcinoma.     Cystoscopy on 06/22/2023 showed a 3 cm papillary right bladder wall lesion.     TURBT > 5 cm perf    Malignant neoplasm of prostate 03/28/2024 July 2023 diagnosed per Dr. Filiberto Garcia.  Completed radiation 10/19/2023.      Primary hypertension     Tricuspid regurgitation     follows with Dr. Ma      Social History     Tobacco Use   Smoking Status Never    Passive exposure: Never   Smokeless Tobacco Never      Past Surgical History:   Procedure Laterality  "Date    CARDIAC ELECTROPHYSIOLOGY MAPPING AND ABLATION      Dr. Casimiro Avery at [facility]    CATARACT EXTRACTION, BILATERAL      CLOSURE OF LEFT ATRIAL APPENDAGE USING DEVICE      Watchman Procedure    SHOULDER SURGERY Left     TONSILLECTOMY          Health Maintenance:      Health Maintenance Topics with due status: Not Due       Topic Last Completion Date    Influenza Vaccine 03/28/2024    Hemoglobin A1c (Prediabetes) 07/16/2024    Lipid Panel 07/16/2024       Objective:  274}   Vital Signs  Temp: 98.1 °F (36.7 °C)  Temp Source: Oral  Pulse: 60  Resp: 18  SpO2: 98 %  BP: (!) 148/80  BP Location: Right arm  Patient Position: Sitting  Pain Score: 0-No pain  Height and Weight  Height: 5' 6" (167.6 cm)  Weight: 69.9 kg (154 lb 3.2 oz)  BSA (Calculated - sq m): 1.8 sq meters  BMI (Calculated): 24.9  Weight in (lb) to have BMI = 25: 154.6    Over the last two weeks how often have you been bothered by little interest or pleasure in doing things: 0  Over the last two weeks how often have you been bothered by feeling down, depressed or hopeless: 0  PHQ-2 Total Score: 0  PHQ-9 Score: 0  PHQ-9 Interpretation: Minimal or None    Wt Readings from Last 3 Encounters:   08/26/24 69.9 kg (154 lb 3.2 oz)   07/31/24 69 kg (152 lb 1.3 oz)   07/24/24 70.4 kg (155 lb 3.2 oz)     Physical Exam  Vitals and nursing note reviewed.   Constitutional:       General: He is not in acute distress.     Appearance: Normal appearance. He is not ill-appearing or diaphoretic.   HENT:      Head: Normocephalic.   Eyes:      Conjunctiva/sclera: Conjunctivae normal.   Cardiovascular:      Rate and Rhythm: Normal rate and regular rhythm.      Heart sounds: Normal heart sounds.   Pulmonary:      Effort: Pulmonary effort is normal. No respiratory distress.      Breath sounds: Normal breath sounds. No wheezing, rhonchi or rales.   Musculoskeletal:      Cervical back: Neck supple.      Right lower leg: No edema.      Left lower leg: No edema.   Skin:     General: " Skin is warm and dry.   Neurological:      Mental Status: He is alert and oriented to person, place, and time.          Assessment and Plan: 274}     1. Primary hypertension  Assessment & Plan:  Improved but still not controlled    Never received rx for spironolactone 12.5 mg - re-send rx as this is needed to improve BP control and for CHF.   Continue carvedilol 12.5 mg 2 times per day and losartan 25 mg daily.    Have nurse call him in 1 wk for remote BP reading.    Orders:  -     spironolactone (ALDACTONE) 25 MG tablet; Take 0.5 tablets (12.5 mg total) by mouth once daily.  Dispense: 45 tablet; Refill: 3    2. Chronic combined systolic and diastolic heart failure  Assessment & Plan:  Echo in July 2024 with LVEF decreased to 35% with severe left atrial enlargement compared to recent echo at [facility] in 3/2024 which demonstrated LVEF of 61% and normal LA size.     The decline in LVEF is most likely r/t a fib with RVR. He was started on losartan and his coreg was continued; never received aldactone so I re-sent rx 8/26/24.  Cardiology planning repeat echo planned for 3-mth.        Orders:  -     spironolactone (ALDACTONE) 25 MG tablet; Take 0.5 tablets (12.5 mg total) by mouth once daily.  Dispense: 45 tablet; Refill: 3      Diagnosis, risks, benefits, and side effects of any meds and treatment plan were discussed with the patient.  Patient to call or follow-up with any new or worsening symptoms or problems prior to next appointment.  Go to ER for any urgent complications.  All questions were answered to the satisfaction of the patient, and pt verbalized understanding and agreement to treatment plan.      Follow up for as scheduled HTN & HLD, fasting and as needed.    Signature:  ULYSSES Gabriel-BC    Future Appointments   Date Time Provider Department Center   10/1/2024  8:30 AM Ruthy Davis ACNP [facility]   10/17/2024 10:00 AM [facility]   11/5/2024  8:45 AM Susan  MANJINDER CesarP [facility] CARD [facility]   11/15/2024 10:00 AM Mecca Hylton MD [facility]   4/3/2025  1:00 PM AWV NURSE, [facility]   5/5/2025  9:40 AM Juli Zapata FNP [facility] [place]

## 2024-08-26 NOTE — ASSESSMENT & PLAN NOTE
Echo in July 2024 with LVEF decreased to 35% with severe left atrial enlargement compared to recent echo at [facility] in 3/2024 which demonstrated LVEF of 61% and normal LA size.     The decline in LVEF is most likely r/t a fib with RVR. He was started on losartan and his coreg was continued; never received aldactone so I re-sent rx 8/26/24.  Cardiology planning repeat echo planned for 3-mth.

## 2024-09-04 ENCOUNTER — TELEPHONE (OUTPATIENT)
Dept: FAMILY MEDICINE | Facility: CLINIC | Age: 79
End: 2024-09-04
Payer: MEDICARE

## 2024-09-04 VITALS — DIASTOLIC BLOOD PRESSURE: 86 MMHG | SYSTOLIC BLOOD PRESSURE: 131 MMHG

## 2024-09-04 NOTE — TELEPHONE ENCOUNTER
Patient came in for blood pressure check only.  131/86 patient instructed to continue current medications.

## 2024-09-04 NOTE — TELEPHONE ENCOUNTER
----- Message from ULYSSES Gabriel sent at 8/26/2024  9:05 AM CDT -----  Regarding: Remote BP reading  Please call patient for remote BP reading in 1 wk.

## 2024-10-17 ENCOUNTER — HOSPITAL ENCOUNTER (OUTPATIENT)
Dept: CARDIOLOGY | Facility: HOSPITAL | Age: 79
Discharge: HOME | End: 2024-10-17
Attending: NURSE PRACTITIONER
Payer: MEDICARE

## 2024-10-17 VITALS — HEIGHT: 66 IN | BODY MASS INDEX: 24.75 KG/M2 | WEIGHT: 154 LBS

## 2024-10-17 DIAGNOSIS — I42.9 CARDIOMYOPATHY, UNSPECIFIED TYPE: ICD-10-CM

## 2024-10-17 DIAGNOSIS — I48.91 ATRIAL FIBRILLATION, UNSPECIFIED TYPE: ICD-10-CM

## 2024-10-17 LAB
AORTIC ROOT ANNULUS: 3.7 CM
AORTIC VALVE CUSP SEPERATION: 1.51 CM
AV INDEX (PROSTH): 0.68
AV MEAN GRADIENT: 1.9 MMHG
AV PEAK GRADIENT: 4 MMHG
AV VALVE AREA BY VELOCITY RATIO: 2.4 CM²
AV VALVE AREA: 2.3 CM²
AV VELOCITY RATIO: 0.7
BSA FOR ECHO PROCEDURE: 1.8 M2
CV ECHO LV RWT: 0.42 CM
DOP CALC AO PEAK VEL: 1 M/S
DOP CALC AO VTI: 24.7 CM
DOP CALC LVOT AREA: 3.5 CM2
DOP CALC LVOT DIAMETER: 2.1 CM
DOP CALC LVOT PEAK VEL: 0.7 M/S
DOP CALC LVOT STROKE VOLUME: 57.8 CM3
DOP CALCLVOT PEAK VEL VTI: 16.7 CM
E WAVE DECELERATION TIME: 302.57 MSEC
E/A RATIO: 1.15
E/E' RATIO: 6.63 M/S
ECHO LV POSTERIOR WALL: 1.1 CM (ref 0.6–1.1)
FRACTIONAL SHORTENING: 18.9 % (ref 28–44)
INTERVENTRICULAR SEPTUM: 1.1 CM (ref 0.6–1.1)
LEFT ATRIUM AREA SYSTOLIC (APICAL 2 CHAMBER): 23.37 CM2
LEFT ATRIUM AREA SYSTOLIC (APICAL 4 CHAMBER): 20.49 CM2
LEFT ATRIUM VOLUME INDEX MOD: 37.3 ML/M2
LEFT ATRIUM VOLUME MOD: 66.79 ML
LEFT INTERNAL DIMENSION IN SYSTOLE: 4.3 CM (ref 2.1–4)
LEFT VENTRICLE DIASTOLIC VOLUME INDEX: 76.21 ML/M2
LEFT VENTRICLE DIASTOLIC VOLUME: 136.41 ML
LEFT VENTRICLE END SYSTOLIC VOLUME APICAL 2 CHAMBER: 75.28 ML
LEFT VENTRICLE END SYSTOLIC VOLUME APICAL 4 CHAMBER: 57.16 ML
LEFT VENTRICLE MASS INDEX: 127.2 G/M2
LEFT VENTRICLE SYSTOLIC VOLUME INDEX: 47.2 ML/M2
LEFT VENTRICLE SYSTOLIC VOLUME: 84.52 ML
LEFT VENTRICULAR INTERNAL DIMENSION IN DIASTOLE: 5.3 CM (ref 3.5–6)
LEFT VENTRICULAR MASS: 227.7 G
LV LATERAL E/E' RATIO: 5.89 M/S
LV SEPTAL E/E' RATIO: 7.57 M/S
LVED V (TEICH): 136.41 ML
LVES V (TEICH): 84.52 ML
LVOT MG: 1.08 MMHG
LVOT MV: 0.49 CM/S
MV PEAK A VEL: 0.46 M/S
MV PEAK E VEL: 0.53 M/S
MV STENOSIS PRESSURE HALF TIME: 87.75 MS
MV VALVE AREA P 1/2 METHOD: 2.51 CM2
OHS CV RV/LV RATIO: 0.36 CM
PISA MRMAX VEL: 4.87 M/S
PISA TR MAX VEL: 2.35 M/S
PV PEAK GRADIENT: 3 MMHG
PV PEAK VELOCITY: 0.8 M/S
RA VOL SYS: 12.81 ML
RIGHT ATRIAL AREA: 9.3 CM2
RIGHT ATRIUM VOLUME AREA LENGTH APICAL 4 CHAMBER: 12.62 ML
RIGHT VENTRICLE DIASTOLIC BASEL DIMENSION: 1.9 CM
RIGHT VENTRICLE DIASTOLIC LENGTH: 7.3 CM
RIGHT VENTRICLE DIASTOLIC MID DIMENSION: 2.2 CM
RIGHT VENTRICULAR LENGTH IN DIASTOLE (APICAL 4-CHAMBER VIEW): 7.31 CM
RV MID DIAMA: 2.21 CM
TDI LATERAL: 0.09 M/S
TDI SEPTAL: 0.07 M/S
TDI: 0.08 M/S
TR MAX PG: 22 MMHG
TRICUSPID ANNULAR PLANE SYSTOLIC EXCURSION: 1.77 CM
Z-SCORE OF LEFT VENTRICULAR DIMENSION IN END DIASTOLE: 0.69
Z-SCORE OF LEFT VENTRICULAR DIMENSION IN END SYSTOLE: 2.69

## 2024-10-17 PROCEDURE — 93306 TTE W/DOPPLER COMPLETE: CPT

## 2024-11-05 ENCOUNTER — OFFICE VISIT (OUTPATIENT)
Dept: CARDIOLOGY | Facility: CLINIC | Age: 79
End: 2024-11-05
Payer: MEDICARE

## 2024-11-05 VITALS
HEART RATE: 63 BPM | SYSTOLIC BLOOD PRESSURE: 132 MMHG | DIASTOLIC BLOOD PRESSURE: 88 MMHG | BODY MASS INDEX: 24.75 KG/M2 | HEIGHT: 66 IN | WEIGHT: 154 LBS | OXYGEN SATURATION: 99 %

## 2024-11-05 DIAGNOSIS — I50.42 CHRONIC COMBINED SYSTOLIC AND DIASTOLIC HEART FAILURE: Chronic | ICD-10-CM

## 2024-11-05 DIAGNOSIS — I48.0 PAROXYSMAL ATRIAL FIBRILLATION: Chronic | ICD-10-CM

## 2024-11-05 DIAGNOSIS — I10 PRIMARY HYPERTENSION: Primary | Chronic | ICD-10-CM

## 2024-11-05 PROCEDURE — 99215 OFFICE O/P EST HI 40 MIN: CPT | Mod: PBBFAC | Performed by: NURSE PRACTITIONER

## 2024-11-05 PROCEDURE — 99214 OFFICE O/P EST MOD 30 MIN: CPT | Mod: S$PBB,,, | Performed by: NURSE PRACTITIONER

## 2024-11-05 PROCEDURE — 99999 PR PBB SHADOW E&M-EST. PATIENT-LVL V: CPT | Mod: PBBFAC,,, | Performed by: NURSE PRACTITIONER

## 2024-11-05 NOTE — PROGRESS NOTES
PCP: Juli Zapata FNP    Referring Provider:   Chief Complaint   Patient presents with    Atrial Fibrillation    Congestive Heart Failure    Follow-up     Patient denies any cardiac complaints today.       Subjective:   Ruben Alarcon is a 79 y.o. male with hx of atrial fibrillation s/p ablation by Dr. Avery, HfimpEF, HTN,  and upper GI bleed,  who presents for routine follow up. He states that he is doing well and denies complaints.      7/31/2024 presents for HD follow up. Patient had issues with a fib with RVR and ventricular rate in the 170s. He was admitted to [facility] from 7/16/2024-7/17/2024. He is s/p a fib ablation by Dr. Avery and a Watchman Implant as well.   During the admission, an echo demonstrated an LVEF of 35% with severe left atrial enlargement compared to recent echo at [facility] in 3/2024 which demonstrated LVEF of 61% and normal LA size.   The decline in LVEF is most likely r/t a fib with RVR. He was started on losartan and his coreg was continued. He was rx aldactone but there is a problem at the pharmacy and he has not received it yet.     3/27/2024 presents for routine follow up. He states that he is doing well and denies complaints.     9/27/2023 presents for routine follow up. He denies cardiac complaints.   The patient has recently been diagnosed with cancer and is having radiation therapy. Dr. Avery has recommended postponing the Watchman implant until after his radiation therapy has been completed.   3/27/2023 presents for follow up to discuss options for therapy after recent upper GI bleeding. He is now back on his Eliquis for CVA prophylaxis but would like to be referred to Dr. Avery again to be considered for the Watchman device.         Fhx:  Family History   Problem Relation Name Age of Onset    Hypertension Mother      Hypertension Father      Cancer Sister      Leukemia Sister      Cancer Brother      Prostate cancer Brother      Cancer Brother      Bladder Cancer  Brother       Shx:   Social History     Socioeconomic History    Marital status:    Tobacco Use    Smoking status: Never     Passive exposure: Never    Smokeless tobacco: Never   Substance and Sexual Activity    Alcohol use: Never    Drug use: Never    Sexual activity: Yes     Partners: Female     Social Drivers of Health     Financial Resource Strain: Low Risk  (7/23/2024)    Overall Financial Resource Strain (CARDIA)     Difficulty of Paying Living Expenses: Not hard at all   Food Insecurity: No Food Insecurity (7/23/2024)    Hunger Vital Sign     Worried About Running Out of Food in the Last Year: Never true     Ran Out of Food in the Last Year: Never true   Transportation Needs: No Transportation Needs (3/28/2024)    PRAPARE - Transportation     Lack of Transportation (Medical): No     Lack of Transportation (Non-Medical): No   Physical Activity: Inactive (7/23/2024)    Exercise Vital Sign     Days of Exercise per Week: 0 days     Minutes of Exercise per Session: 0 min   Stress: No Stress Concern Present (7/23/2024)    [employer] [place] [employer] - Occupational Stress Questionnaire     Feeling of Stress : Not at all   Housing Stability: Low Risk  (3/28/2024)    Housing Stability Vital Sign     Unable to Pay for Housing in the Last Year: No     Number of Places Lived in the Last Year: 1     Unstable Housing in the Last Year: No       EKG   7/31/2024 RSR with sinus arrhythmia; HR 68 bpm; minimal voltage criteria for LVH; NSTWA; when compared with EKG 7/16/2024 no significant change was found.  7/16/2024 RSR with HR 63 bpm; normal EKG  7/16/2024 a fib with HR 90 bpm;   7/16/2024 a fib with RVR  bpm; incomplete RBBB  3/27/2024 RSR with marked sinus arrhythmia; HR 60 bpm; minimal voltage criteria for LVH; NSTWA  Results for orders placed or performed in visit on 07/31/24   EKG 12-lead    Collection Time: 07/31/24 10:44 AM   Result Value Ref Range    QRS Duration 102 ms    OHS QTC Calculation  444 ms    Narrative    Test Reason : I48.91,    Vent. Rate : 068 BPM     Atrial Rate : 068 BPM     P-R Int : 164 ms          QRS Dur : 102 ms      QT Int : 418 ms       P-R-T Axes : 048 -26 -03 degrees     QTc Int : 444 ms    Normal sinus rhythm with sinus arrhythmia  Minimal voltage criteria for LVH, may be normal variant ( R in aVL )  Nonspecific T wave abnormality  Abnormal ECG  When compared with ECG of 16-JUL-2024 22:18,  No significant change was found  Confirmed by Ruben Ma MD ([id number]) on 8/1/2024 8:43:01 AM    Referred By:             Confirmed By:Ruben Ma MD            Lab Results   Component Value Date     07/16/2024    K 3.5 07/16/2024     (H) 07/16/2024    CO2 26 07/16/2024    BUN 22 (H) 07/16/2024    CREATININE 1.16 07/16/2024    CALCIUM 8.7 07/16/2024    ANIONGAP 10 07/16/2024       Lab Results   Component Value Date    CHOL 114 07/16/2024    CHOL 200 05/01/2024    CHOL 182 03/02/2023     Lab Results   Component Value Date    HDL 36 (L) 07/16/2024    HDL 50 05/01/2024    HDL 47 03/02/2023     Lab Results   Component Value Date    LDLCALC 49 07/16/2024    LDLCALC 130 05/01/2024    LDLCALC 119 03/02/2023     Lab Results   Component Value Date    TRIG 145 07/16/2024    TRIG 101 05/01/2024    TRIG 82 03/02/2023     Lab Results   Component Value Date    CHOLHDL 3.2 07/16/2024    CHOLHDL 4.0 05/01/2024    CHOLHDL 3.9 03/02/2023       Lab Results   Component Value Date    WBC 7.52 07/16/2024    HGB 11.5 (L) 07/16/2024    HCT 35.2 (L) 07/16/2024    MCV 92.6 07/16/2024     07/16/2024           Current Outpatient Medications:     aspirin (ECOTRIN) 81 MG EC tablet, Take 81 mg by mouth once daily., Disp: , Rfl:     carvediloL (COREG) 12.5 MG tablet, Take 1 tablet (12.5 mg total) by mouth 2 (two) times daily., Disp: 180 tablet, Rfl: 1    cholecalciferol, vitamin D3, (VITAMIN D3) 50 mcg (2,000 unit) Cap capsule, Take by mouth once daily., Disp: , Rfl:     losartan (COZAAR) 25 MG  "tablet, Take 1 tablet (25 mg total) by mouth once daily., Disp: 90 tablet, Rfl: 3    omega-3 fatty acids/fish oil (FISH OIL-OMEGA-3 FATTY ACIDS) 300-1,000 mg capsule, Take 1 capsule by mouth once daily., Disp: , Rfl:     pantoprazole (PROTONIX) 40 MG tablet, TAKE 1 TABLET DAILY, Disp: 90 tablet, Rfl: 3    rosuvastatin (CRESTOR) 10 MG tablet, Take 1 tablet (10 mg total) by mouth once daily., Disp: 90 tablet, Rfl: 3    spironolactone (ALDACTONE) 25 MG tablet, Take 0.5 tablets (12.5 mg total) by mouth once daily., Disp: 45 tablet, Rfl: 3  Meds reviewed and reconciled.      Review of Systems   Respiratory:  Negative for shortness of breath.    Cardiovascular:  Negative for chest pain, palpitations, orthopnea, claudication, leg swelling and PND.   Neurological:  Negative for dizziness, loss of consciousness and weakness.           Objective:   /88 (BP Location: Left arm, Patient Position: Sitting)   Pulse 63   Ht 5' 6" (1.676 m)   Wt 69.9 kg (154 lb)   SpO2 99%   BMI 24.86 kg/m²     Physical Exam  Vitals and nursing note reviewed.   Constitutional:       Appearance: Normal appearance. He is normal weight.   HENT:      Head: Normocephalic and atraumatic.   Neck:      Vascular: No carotid bruit.   Cardiovascular:      Rate and Rhythm: Normal rate and regular rhythm.      Pulses: Normal pulses.      Heart sounds: Normal heart sounds.   Pulmonary:      Effort: Pulmonary effort is normal.      Breath sounds: Normal breath sounds.   Abdominal:      Palpations: Abdomen is soft.   Musculoskeletal:      Cervical back: Neck supple.      Right lower leg: No edema.      Left lower leg: No edema.   Skin:     General: Skin is warm and dry.      Capillary Refill: Capillary refill takes less than 2 seconds.   Neurological:      Mental Status: He is alert.           Assessment:     1. Primary hypertension        2. Paroxysmal atrial fibrillation        3. Chronic combined systolic and diastolic heart failure              Plan: " "  Primary hypertension  158/104 mmHg initially  Rechecked 148/98 mmHg  Patient has had issues with "white coat htn" in the past.   Bp rechecked 132/88 mmHg    Patient is to keep bp log and notify our office or PCP if bp not well controlled.    Paroxysmal atrial fibrillation   s/p a fib ablation by Dr. Avery and a Watchman Implant as well.   7/16/2024  echo demonstrated an LVEF of 35% with severe left atrial enlargement compared to recent echo at [facility] in 3/2024 which demonstrated LVEF of 61% and normal LA size.   The decline in LVEF is most likely r/t a fib with RVR.    LVEF 55-60% 10/17/2024    Chronic combined systolic and diastolic heart failure  7/16/2024  echo demonstrated an LVEF of 35% with severe left atrial enlargement compared to recent echo at [facility] in 3/2024 which demonstrated LVEF of 61% and normal LA size.   The decline in LVEF is most likely r/t a fib with RVR.    LVEF 55-60% 10/17/2024    Continue Coreg, aldactone, and losartan        RTC: 6 months          "

## 2024-11-05 NOTE — ASSESSMENT & PLAN NOTE
7/16/2024  echo demonstrated an LVEF of 35% with severe left atrial enlargement compared to recent echo at [facility] in 3/2024 which demonstrated LVEF of 61% and normal LA size.   The decline in LVEF is most likely r/t a fib with RVR.    LVEF 55-60% 10/17/2024    Continue Coreg, aldactone, and losartan

## 2024-11-05 NOTE — ASSESSMENT & PLAN NOTE
s/p a fib ablation by Dr. Avery and a Watchman Implant as well.   7/16/2024  echo demonstrated an LVEF of 35% with severe left atrial enlargement compared to recent echo at [facility] in 3/2024 which demonstrated LVEF of 61% and normal LA size.   The decline in LVEF is most likely r/t a fib with RVR.    LVEF 55-60% 10/17/2024

## 2024-11-05 NOTE — ASSESSMENT & PLAN NOTE
"158/104 mmHg initially  Rechecked 148/98 mmHg  Patient has had issues with "white coat htn" in the past.   Bp rechecked 132/88 mmHg    Patient is to keep bp log and notify our office or PCP if bp not well controlled.  "

## 2024-11-15 ENCOUNTER — OFFICE VISIT (OUTPATIENT)
Dept: DERMATOLOGY | Facility: CLINIC | Age: 79
End: 2024-11-15
Payer: MEDICARE

## 2024-11-15 DIAGNOSIS — L57.0 ACTINIC KERATOSES: Primary | ICD-10-CM

## 2024-11-15 DIAGNOSIS — L72.0 EPIDERMAL CYST: ICD-10-CM

## 2024-11-15 NOTE — PROGRESS NOTES
[facility]  Sampson Hylton MD    [address] [address]  [phone]    Fax: [phone]    Patient Name: Ruben Alarcon  Medical Record Number: [id number]  PCP: Juli Zapata FNP  Age: 79 y.o. : 1945  Contact: [phone] (home)     CC: lesion on the back  History of Present Illness:     Ruben Alarcon is a 79 y.o.  male with no history of skin cancer  who presents to clinic today for lesion on the back. It has been present 1 month. Symptoms include drainage and tender.     The patient has no other concerns today.    Review of Systems:     Unremarkable other than mentioned above.     Physical Exam:     General: Relaxed, oriented, alert    Skin examination of the scalp, face, neck, chest, back, abdomen, upper extremities and lower extremities were normal except for as listed below      Assessment and Plan:     1. Epidermal Cyst  - subcutaneous cyst with prominent follicular pore located on the back    Plan:   Excision x2 scheduled  at 8       2. Actinic Keratoses  Erythematous, scaly papules on face, scalp    Plan: Liquid Nitrogen.  A total of 2 lesions were treated with liquid nitrogen for 2 freeze-thaw cycles lasting 5 seconds, located on the above locations.   The patient's consent was obtained including but not limited to risks of crusting, scabbing,  blistering, scarring, darker or lighter pigmentary change, recurrence, incomplete removal and infection.    Counseling.  Sun protective clothing and broad spectrum sunscreen can prevent the formation of AK.   AKs can be treated with cryotherapy, photodynamic therapy, imiquimod, topical 5-FU.  Actinic Keratoses are precancerous proliferations that occur within sun damaged skin. If untreated,  a small subset of AKs can develop into Squamous Cell Carcinoma.      3. Monitoring nevus on the R posterior ear lobe      Sampson Hylton MD   Mohs Surgery/Dermatologic Oncology  Dermatology

## 2024-12-09 NOTE — PATIENT INSTRUCTIONS
WOUND CARE INSTRUCTIONS    1. Leave your pressure bandage on for 24 hours (unless told to keep it on for 48 hours). You will not need to perform any wound care until this bandage is removed. Please do not get the bandage wet.  2. When you initially begin wound care, you may let the water hit the pressure bandage to loosen it from your skin. The bandage should be removed before bathing/showering.  3. Wash your hands thoroughly before starting wound care. Do not use the same cloth/rag/sponge you would use to wash the remainder of your body as this may introduce bacteria from other areas of your body and possibly cause infection at the surgical site.  4. Please clean the surgery site once to twice daily with a mild liquid soap (i.e. Dove, Cetaphil, Baby shampoo).   5. Dry the area with a fresh Q-tip or clean gauze.  6. Perform Vinegar soak to the area to help prevent infection. Soak the affected area for 5-10 minutes once daily, then pat dry. To make a quart of the vinegar soak, mix 3 tablespoons white vinegar with 1 quart of luke-warm water.   7. Apply a generous amount of Vaseline or Aquaphor to the wound/sutures (If you have been prescribed antibiotic ointment such as Mupirocin or Gentamicin, use this instead of vaseline/aquaphor). If you are not sure of the sanitary condition of any Vaseline/Aquaphor you may have at home, please purchase a new jar or tube.    8. Cut a non-stick bandage pad to fit the area and then use bandaging tape to hold in place. Paper tape is a good option for very sensitive skin types.  9. You will be doing this wound care regimen until sutures are removed   10.  After surgery, you may restart all your medications that were stopped (if applicable).      If your surgical site is on your forehead, or close to the eye area, you will want to use ice packs. Please apply ice packs every hour for 20 minutes while awake. Sleep elevated for the next two nights as this will help decrease the amount of  bruising and swelling you will notice the evening after surgery and into the next morning.   For surgical areas on your arms/legs, try to keep the area elevated above the level of your heart as much as possible. This will help to decrease swelling. Frequent gentle rubbing of your fingers or toes in that area will prevent numbness and stiffness.   If located on your arm/hand, we ask that you do not lift anything heavier than a gallon of milk for two weeks. Keep the arm/hand elevated to help decrease swelling in the wrist and fingers. Do not wear jewelry as impending swelling could cause discomfort.  For surgical areas on your head/neck, do not bend over or stoop down. Do not drop your head, as this increases blood to the surgical area and can induce bleeding. Refrain from use of hair care products, hair coloring, or permanents until sutures have been removed and/or the surgical site has completely healed.    BATHING: Begin bathing/showering once pressure bandage comes off. Do not let direct water pressure hit the surgery site. It is okay if it gets wet, just let the water roll over.    PAIN: Tylenol (Acetaminophen) or NSAIDs such as ibuprofen (Advil) or naproxen (Aleve) are adequate for pain relief in most cases, if you are able to take those medications. Alternating Tylenol (acetaminophen) and NSAIDs at 3 hour intervals works well as these medications work differently. For instance, take 1 g of Tylenol at hour 0, then 200-400 mg of Advil at hour 3 if needed, then 1 g of Tylenol at hour 6 if needed, then 200-400 mg of Advil at hour 9 if needed. Do not exceed the daily limit for either medication, which can be found on the bottle. We try to avoid narcotic medications as much as possible. If you are still having severe pain not managed by the above methods, please call our clinic.    SIGNS OF POSSIBLE INFECTION: Significant redness surrounding the surgery site that is warm to the touch, persistent or worsening pain,  fever or flu-like symptoms, increased swelling to the area, thick yellow discharge, and/or foul odor. Please call our office as soon as possible if you experience any of these symptoms as you may have an infection.     BLEEDING: A mild amount of blood on the bandage is expected. Soaking through the bandage is not normal. If this occurs, remove the soiled bandage and apply uninterrupted pressure for 20 minutes by the clock. If this does not stop the bleeding, hold pressure for another 20 minutes with an ice pack. If bleeding stops, apply a bandage per wound care instructions.     IF THE BLEEDING PERSISTS, PLEASE CALL OUR OFFICE.     Normal office hours:   After hours:     If you have concerns about how your wound is healing and would like to send us a photo, please send us a [employer] message, or call for instructions on how to securely send an email.

## 2024-12-12 ENCOUNTER — PROCEDURE VISIT (OUTPATIENT)
Dept: DERMATOLOGY | Facility: CLINIC | Age: 79
End: 2024-12-12
Payer: MEDICARE

## 2024-12-12 VITALS — SYSTOLIC BLOOD PRESSURE: 192 MMHG | DIASTOLIC BLOOD PRESSURE: 96 MMHG

## 2024-12-12 DIAGNOSIS — D48.9 NEOPLASM OF UNCERTAIN BEHAVIOR: Primary | ICD-10-CM

## 2024-12-12 PROCEDURE — 88305 TISSUE EXAM BY PATHOLOGIST: CPT | Mod: 26,,, | Performed by: PATHOLOGY

## 2024-12-12 PROCEDURE — 88305 TISSUE EXAM BY PATHOLOGIST: CPT | Mod: TC,SUR | Performed by: STUDENT IN AN ORGANIZED HEALTH CARE EDUCATION/TRAINING PROGRAM

## 2024-12-12 PROCEDURE — 88304 TISSUE EXAM BY PATHOLOGIST: CPT | Mod: 26,,, | Performed by: PATHOLOGY

## 2024-12-12 NOTE — PROGRESS NOTES
Excision Consult Note    Rubne Alarcon is a 79 y.o. male who is referred by Dr. Hylton for evaluation of a NUB on the L superior mid back and L inferior mid back that have grown and become symptomatic.     Recurrent skin cancer: No    Preoperative Risk Factors:  Current Anticoagulants: Yes Asprin 81 mg  Endocarditis / Rheumatic Fever hx: No  Immunocompromised: No  Prosthetic joint: No  Congenital heart defect: No  Prosthetic heart valve: No  Diabetic: No  Transplant: No  Pacemaker: No  Defibrillator:  No  Prior problem with local anesthesia: No  Tobacco History: No]  Clindamycin Allergy: No  Pregnant: no     Transmissible Diseases:  HIV No  Hepatitis B or C  No      Exam:  Limited skin exam is normal except for a NUB  located on the L superior and inferior mid back.    Pathologic Findings:  Accession # [id number]  Diagnosis: NUB    Assessment and Plan:  Treatment Options : Given the indications and high cure rate, the patient has agreed to proceed with excision  Risks and Benefits : The rationale for excision was explained to the patient. The risks and benefits to therapy were discussed in detail. Specifically, the risks of infection, scarring, bleeding, dehiscence, hematoma, prolonged wound healing, incomplete removal, allergy to anesthesia, nerve injury, inability to clear the lesion and recurrence were addressed. The treatment site was clearly identified and confirmed by the patient.    Plan:  Excision    Sampson Hylton MD   Mohs Surgery/Dermatologic Oncology

## 2024-12-12 NOTE — PROGRESS NOTES
[facility]    Sampson Hylton MD    Elliptical Excision with Linear Closure    Tumor Type: A) NUB, B) NUB  Location:  A) L Superior mid back, B) L inferior mid back  Derm-Path Accession #:  [id number]  Lesion Size:  A) 0.9 x 1.1 cm, B) 1.2 x 1.3 cm  Surgical Margins: 0  Post op size: A) 0.9 x 1.1 cm, B) 1.2 x 1.3 cm  Level of Defect:  fat  Repair Type:  Linear   Repair Length:  A) 4.5 cm, B) 3.1 cm  Sutures: 3-0 PDS, 4-0 Prolene  Amount of lidocaine used: 6 cc of 2% lidocaine with epi  Primary Surgeon: SAVAGE Hylton MD      INDICATIONS:  The risks of bleeding, infection, discomfort, incomplete removal, and scar formation were explained to the patient.  All questions were answered.  After informed consent, confirmation of site and identity, and appropriate instructions, the patient underwent the procedure as follows:    PROCEDURE:  With the patient in a supine position, the lesion was outlined with the above margins. An ellipse was designed around the lesion to conform to relaxed skin tension lines in an effort to minimize scarring and deformity.  The patient was then placed in a supine position.  The lesion and surrounding skin were prepped with chlorhexidine, draped, and anesthetized with 1% lidocaine with epinephrine 1:100,100 buffered with 1:10 sodium bicarbonate.  Using a #15 blade, the skin was excised along premarked lines.  The resulting defect extended through deep subcutaneous tissue.  Wound margins were undermined to limit functional deformity/impairment of adjacent structures.  Bleeding vessels were controlled with  monopolar  electrodessication .  A deep placating retention suture was placed to offload tension to the deep margin. The dermis and subcutaneous tissue were closed with buried vertical mattress sutures.  Epidermal approximation was meticulously refined with simple running sutures, resulting in a linear closure with little to no wound tension.  Blood loss was estimated to be less than 5cc.   The area was coated with petrolatum and covered with a non-adherent dressing followed by gauze and tape.  Postoperative instructions were reviewed per protocol.  The patient left alert and fully oriented.        Sampson Hylton MD

## 2024-12-23 ENCOUNTER — CLINICAL SUPPORT (OUTPATIENT)
Dept: DERMATOLOGY | Facility: CLINIC | Age: 79
End: 2024-12-23
Payer: MEDICARE

## 2024-12-23 DIAGNOSIS — L08.9 SKIN INFECTION: ICD-10-CM

## 2024-12-23 DIAGNOSIS — Z48.02 ENCOUNTER FOR REMOVAL OF SUTURES: Primary | ICD-10-CM

## 2024-12-23 PROCEDURE — 87070 CULTURE OTHR SPECIMN AEROBIC: CPT | Mod: ,,, | Performed by: CLINICAL MEDICAL LABORATORY

## 2024-12-23 PROCEDURE — 99024 POSTOP FOLLOW-UP VISIT: CPT | Mod: ,,, | Performed by: STUDENT IN AN ORGANIZED HEALTH CARE EDUCATION/TRAINING PROGRAM

## 2024-12-23 NOTE — PROGRESS NOTES
Elliptical Excision with Linear Closure     Tumor Type: A) NUB, B) NUB  Location:  A) L Superior mid back, B) L inferior mid back  Derm-Path Accession #:  [id number]  Lesion Size:  A) 0.9 x 1.1 cm, B) 1.2 x 1.3 cm  Surgical Margins: 0  Post op size: A) 0.9 x 1.1 cm, B) 1.2 x 1.3 cm  Level of Defect:  fat  Repair Type:  Linear   Repair Length:  A) 4.5 cm, B) 3.1 cm  Sutures: 3-0 PDS, 4-0 Prolene  Amount of lidocaine used: 6 cc of 2% lidocaine with epi  Primary Surgeon: SAVAGE Hylton MD        Sutures removed without incident   Wound culture obtained   Katelin Preston CMA

## 2024-12-25 LAB — MICROORGANISM SPEC CULT: NORMAL

## 2025-01-08 DIAGNOSIS — K20.80 LOS ANGELES GRADE C ESOPHAGITIS: ICD-10-CM

## 2025-01-08 RX ORDER — PANTOPRAZOLE SODIUM 40 MG/1
40 TABLET, DELAYED RELEASE ORAL
Qty: 90 TABLET | Refills: 3 | Status: SHIPPED | OUTPATIENT
Start: 2025-01-08

## 2025-01-16 ENCOUNTER — TELEPHONE (OUTPATIENT)
Dept: CARDIOLOGY | Facility: CLINIC | Age: 80
End: 2025-01-16
Payer: MEDICARE

## 2025-01-16 NOTE — TELEPHONE ENCOUNTER
Spoke with pt david and let her know usually who draws labs is the ones who treat labs. If they do not, the patient should contact PCP. Pt daughter states they will call PCP.

## 2025-01-16 NOTE — TELEPHONE ENCOUNTER
----- Message from Zonia sent at 1/15/2025  2:03 PM CST -----  Regarding: Pt.'s advice  Who Called: Ruben Alarcon' daughter    Caller is requesting assistance/information from provider's office.    Symptoms (please be specific): Potassium high  How long has patient had these symptoms:  almost a month ago      Preferred Method of Contact: Phone Call  Best Call Back Number, if different: [phone] Narcisa  Additional Information: Pt.'s daughter wants to inform ACNP Ruthy Davis that pt. Has some lab work done at [place] at Kishore De La Torre's office. She wanted to speak to the nurse.

## 2025-03-27 NOTE — PROGRESS NOTES
" [facility]  [address] [place] [address]  Phone: [phone]  Fax: [phone]     PATIENT NAME: Ruben Alarcon   : 1945    AGE: 79 y.o. DATE OF ENCOUNTER: 4/3/25    Provider:    MRN: [id number]      Ruben Alarcon presented for a follow-up Medicare AWV today. The following components were reviewed and updated:    Medical history  Family History  Social history  Allergies and Current Medications  Health Risk Assessment  Health Maintenance  Care Team    **See Completed Assessments for Annual Wellness visit with in the encounter summary    The following assessments were completed:  Depression Screening  Cognitive function Screening  Timed Get Up Test  Whisper Test        Opioid documentation:      Patient does not have a current opioid prescription.          Vitals:    25 1309   BP: 124/80   BP Location: Right arm   Patient Position: Sitting   Pulse: 79   Resp: 16   Temp: 98.3 °F (36.8 °C)   TempSrc: Oral   SpO2: 96%   Weight: 70.8 kg (156 lb)   Height: 5' 6" (1.676 m)     Body mass index is 25.18 kg/m².       Physical Exam  Vitals and nursing note reviewed.   Constitutional:       General: He is not in acute distress.     Appearance: Normal appearance. He is not ill-appearing or diaphoretic.   HENT:      Head: Normocephalic.   Eyes:      Conjunctiva/sclera: Conjunctivae normal.   Cardiovascular:      Rate and Rhythm: Normal rate and regular rhythm.      Heart sounds: Normal heart sounds.   Pulmonary:      Effort: Pulmonary effort is normal. No respiratory distress.      Breath sounds: Normal breath sounds. No wheezing, rhonchi or rales.   Musculoskeletal:      Cervical back: Neck supple.      Right lower leg: No edema.      Left lower leg: No edema.   Skin:     General: Skin is warm and dry.   Neurological:      Mental Status: He is alert and oriented to person, place, and time.           Diagnoses and health risks identified today and associated " recommendations/orders:  1. Encounter for subsequent annual wellness visit (AWV) in Medicare patient    2. Primary hypertension  Assessment & Plan:  Improved and controlled  BP Readings from Last 3 Encounters:   04/03/25 124/80   12/12/24 (!) 192/96   11/05/24 132/88     Refill/resume spironolactone 12.5 mg for BP control and for CHF.   Continue carvedilol 12.5 mg 2 times per day and losartan 25 mg daily.    Orders:  -     spironolactone (ALDACTONE) 25 MG tablet; Take 0.5 tablets (12.5 mg total) by mouth once daily.  Dispense: 45 tablet; Refill: 3  -     losartan (COZAAR) 25 MG tablet; Take 1 tablet (25 mg total) by mouth once daily.  Dispense: 90 tablet; Refill: 3    3. Other hyperlipidemia  Assessment & Plan:  Hasn't been taking rosuvastatin, ran out of refills.  Refills sent today.    Lab Results   Component Value Date    CHOL 114 07/16/2024    CHOL 200 05/01/2024    CHOL 182 03/02/2023     Lab Results   Component Value Date    HDL 36 (L) 07/16/2024    HDL 50 05/01/2024    HDL 47 03/02/2023     Lab Results   Component Value Date    LDLCALC 49 07/16/2024    LDLCALC 130 05/01/2024    LDLCALC 119 03/02/2023     Lab Results   Component Value Date    TRIG 145 07/16/2024    TRIG 101 05/01/2024    TRIG 82 03/02/2023       Lab Results   Component Value Date    CHOLHDL 3.2 07/16/2024    CHOLHDL 4.0 05/01/2024    CHOLHDL 3.9 03/02/2023       Orders:  -     rosuvastatin (CRESTOR) 10 MG tablet; Take 1 tablet (10 mg total) by mouth once daily.  Dispense: 90 tablet; Refill: 3    4. Paroxysmal atrial fibrillation  Assessment & Plan:  Rate controlled, followed by Cardiology.  S/p a fib ablation by Dr. Avery and a Watchman Implant Jan 2024      5. Chronic combined systolic and diastolic heart failure  Assessment & Plan:  Echo in July 2024 with LVEF decreased to 35% with severe left atrial enlargement.  Echo Oct 2024 with LVEF 55-60%.     Continue carvedilol, spironolactone, and losartan.        Orders:  -     spironolactone  (ALDACTONE) 25 MG tablet; Take 0.5 tablets (12.5 mg total) by mouth once daily.  Dispense: 45 tablet; Refill: 3         Provided Ruben with a 5-10 year written screening schedule and personal prevention plan. Recommendations were developed using the USPSTF age appropriate recommendations. Education, counseling, and referrals were provided as needed.  After Visit Summary printed and given to patient which includes a list of additional screenings\tests needed.      Follow up in about 1 year (around 4/3/2026) for Medicare AWV, and otherwise follow-up as routinely scheduled.    Signature:  ULYSSES Gabriel-BC    Future Appointments   Date Time Provider Department Center   5/1/2025 11:20 AM Juli Zapata FNP [facility] [place]   5/7/2025  9:00 AM Ruthy Davis ACNP [facility]   11/18/2025  8:30 AM Mecca Hylton MD [facility]   4/9/2026 10:00 AM AWV NURSE, [facility] [place]       Covid vaccine - declined, Tetanus vaccine - declined, Shingles vaccine - declined, RSV vaccine - declined, Influenza vaccine - declined.    I offered to discuss advanced care planning, including how to pick a person who would make decisions for you if you were unable to make them for yourself, called a health care power of , and what kind of decisions you might make such as use of life sustaining treatments such as ventilators and tube feeding when faced with a life limiting illness recorded on a living will that they will need to know. (How you want to be cared for as you near the end of your natural life)     X Patient is interested in learning more about how to make advanced directives.  I provided them paperwork and offered to discuss this with them.

## 2025-04-03 ENCOUNTER — OFFICE VISIT (OUTPATIENT)
Dept: FAMILY MEDICINE | Facility: CLINIC | Age: 80
End: 2025-04-03
Payer: MEDICARE

## 2025-04-03 VITALS
BODY MASS INDEX: 25.07 KG/M2 | HEART RATE: 79 BPM | OXYGEN SATURATION: 96 % | SYSTOLIC BLOOD PRESSURE: 124 MMHG | HEIGHT: 66 IN | RESPIRATION RATE: 16 BRPM | WEIGHT: 156 LBS | DIASTOLIC BLOOD PRESSURE: 80 MMHG | TEMPERATURE: 98 F

## 2025-04-03 DIAGNOSIS — Z00.00 ENCOUNTER FOR SUBSEQUENT ANNUAL WELLNESS VISIT (AWV) IN MEDICARE PATIENT: Primary | ICD-10-CM

## 2025-04-03 DIAGNOSIS — I48.0 PAROXYSMAL ATRIAL FIBRILLATION: Chronic | ICD-10-CM

## 2025-04-03 DIAGNOSIS — I10 PRIMARY HYPERTENSION: Chronic | ICD-10-CM

## 2025-04-03 DIAGNOSIS — I50.42 CHRONIC COMBINED SYSTOLIC AND DIASTOLIC HEART FAILURE: Chronic | ICD-10-CM

## 2025-04-03 DIAGNOSIS — E78.49 OTHER HYPERLIPIDEMIA: Chronic | ICD-10-CM

## 2025-04-03 PROCEDURE — G0439 PPPS, SUBSEQ VISIT: HCPCS | Mod: ,,, | Performed by: NURSE PRACTITIONER

## 2025-04-03 RX ORDER — SPIRONOLACTONE 25 MG/1
12.5 TABLET ORAL DAILY
Qty: 45 TABLET | Refills: 3 | Status: SHIPPED | OUTPATIENT
Start: 2025-04-03 | End: 2026-04-03

## 2025-04-03 RX ORDER — LOSARTAN POTASSIUM 25 MG/1
25 TABLET ORAL DAILY
Qty: 90 TABLET | Refills: 3 | Status: SHIPPED | OUTPATIENT
Start: 2025-04-03 | End: 2026-04-03

## 2025-04-03 RX ORDER — ROSUVASTATIN CALCIUM 10 MG/1
10 TABLET, COATED ORAL DAILY
Qty: 90 TABLET | Refills: 3 | Status: SHIPPED | OUTPATIENT
Start: 2025-04-03 | End: 2026-04-03

## 2025-04-03 NOTE — ASSESSMENT & PLAN NOTE
Echo in July 2024 with LVEF decreased to 35% with severe left atrial enlargement.  Echo Oct 2024 with LVEF 55-60%.     Continue carvedilol, spironolactone, and losartan.

## 2025-04-03 NOTE — ASSESSMENT & PLAN NOTE
Rate controlled, followed by Cardiology.  S/p a fib ablation by Dr. Avery and a Watchman Implant Jan 2024

## 2025-04-03 NOTE — PATIENT INSTRUCTIONS
Counseling and Referral of Other Preventative  (Italic type indicates deductible and co-insurance are waived)    Patient Name: Ruben Alarcon  Today's Date: 4/3/2025    Health Maintenance       Date Due Completion Date    Shingles Vaccine (1 of 2) Never done ---    TETANUS VACCINE 02/01/2013 2/1/2003    RSV Vaccine (Age 60+ and Pregnant patients) (1 - 1-dose 75+ series) Never done ---    Influenza Vaccine (1) 09/01/2024 3/28/2024 (Declined)    Override on 3/28/2024: Declined    Hemoglobin A1c (Prediabetes) 07/16/2025 7/16/2024    Lipid Panel 07/16/2029 7/16/2024        No orders of the defined types were placed in this encounter.      The following information is provided to all patients.  This information is to help you find resources for any of the problems found today that may be affecting your health:                  Living healthy guide: [place]    Understanding Diabetes: www.diabetes.org      Eating healthy: www.cdc.gov/healthyweight      [facility] home safety checklist: www.cdc.gov/steadi/patient.html      Agency on Aging: [place]    Alcoholics anonymous (AA): www.aa.org      Physical Activity: www.alec.nih.gov/ub0jrbp      Tobacco use: [place].gov

## 2025-04-03 NOTE — ASSESSMENT & PLAN NOTE
Improved and controlled  BP Readings from Last 3 Encounters:   04/03/25 124/80   12/12/24 (!) 192/96   11/05/24 132/88     Refill/resume spironolactone 12.5 mg for BP control and for CHF.   Continue carvedilol 12.5 mg 2 times per day and losartan 25 mg daily.

## 2025-04-03 NOTE — ASSESSMENT & PLAN NOTE
Hasn't been taking rosuvastatin, ran out of refills.  Refills sent today.    Lab Results   Component Value Date    CHOL 114 07/16/2024    CHOL 200 05/01/2024    CHOL 182 03/02/2023     Lab Results   Component Value Date    HDL 36 (L) 07/16/2024    HDL 50 05/01/2024    HDL 47 03/02/2023     Lab Results   Component Value Date    LDLCALC 49 07/16/2024    LDLCALC 130 05/01/2024    LDLCALC 119 03/02/2023     Lab Results   Component Value Date    TRIG 145 07/16/2024    TRIG 101 05/01/2024    TRIG 82 03/02/2023       Lab Results   Component Value Date    CHOLHDL 3.2 07/16/2024    CHOLHDL 4.0 05/01/2024    CHOLHDL 3.9 03/02/2023

## 2025-05-01 ENCOUNTER — OFFICE VISIT (OUTPATIENT)
Dept: FAMILY MEDICINE | Facility: CLINIC | Age: 80
End: 2025-05-01
Payer: MEDICARE

## 2025-05-01 VITALS
SYSTOLIC BLOOD PRESSURE: 142 MMHG | DIASTOLIC BLOOD PRESSURE: 80 MMHG | RESPIRATION RATE: 18 BRPM | HEIGHT: 66 IN | OXYGEN SATURATION: 98 % | HEART RATE: 54 BPM | WEIGHT: 152.19 LBS | TEMPERATURE: 98 F | BODY MASS INDEX: 24.46 KG/M2

## 2025-05-01 DIAGNOSIS — Z79.899 OTHER LONG TERM (CURRENT) DRUG THERAPY: ICD-10-CM

## 2025-05-01 DIAGNOSIS — I50.42 CHRONIC COMBINED SYSTOLIC AND DIASTOLIC HEART FAILURE: Chronic | ICD-10-CM

## 2025-05-01 DIAGNOSIS — E78.49 OTHER HYPERLIPIDEMIA: Chronic | ICD-10-CM

## 2025-05-01 DIAGNOSIS — I10 PRIMARY HYPERTENSION: Primary | Chronic | ICD-10-CM

## 2025-05-01 LAB
ALBUMIN SERPL BCP-MCNC: 3.9 G/DL (ref 3.4–4.8)
ALBUMIN/GLOB SERPL: 1.1 {RATIO}
ALP SERPL-CCNC: 59 U/L (ref 40–150)
ALT SERPL W P-5'-P-CCNC: 12 U/L
ANION GAP SERPL CALCULATED.3IONS-SCNC: 13 MMOL/L (ref 7–16)
AST SERPL W P-5'-P-CCNC: 17 U/L (ref 11–45)
BASOPHILS # BLD AUTO: 0.05 K/UL (ref 0–0.2)
BASOPHILS NFR BLD AUTO: 0.9 % (ref 0–1)
BILIRUB SERPL-MCNC: 0.7 MG/DL
BUN SERPL-MCNC: 20 MG/DL (ref 8–26)
BUN/CREAT SERPL: 15 (ref 6–20)
CALCIUM SERPL-MCNC: 8.4 MG/DL (ref 8.8–10)
CHLORIDE SERPL-SCNC: 108 MMOL/L (ref 98–107)
CHOLEST SERPL-MCNC: 205 MG/DL
CHOLEST/HDLC SERPL: 5.5 {RATIO}
CO2 SERPL-SCNC: 23 MMOL/L (ref 23–31)
CREAT SERPL-MCNC: 1.32 MG/DL (ref 0.72–1.25)
DIFFERENTIAL METHOD BLD: ABNORMAL
EGFR (NO RACE VARIABLE) (RUSH/TITUS): 55 ML/MIN/1.73M2
EOSINOPHIL # BLD AUTO: 0.19 K/UL (ref 0–0.5)
EOSINOPHIL NFR BLD AUTO: 3.4 % (ref 1–4)
ERYTHROCYTE [DISTWIDTH] IN BLOOD BY AUTOMATED COUNT: 13 % (ref 11.5–14.5)
EST. AVERAGE GLUCOSE BLD GHB EST-MCNC: 117 MG/DL
GLOBULIN SER-MCNC: 3.4 G/DL (ref 2–4)
GLUCOSE SERPL-MCNC: 90 MG/DL (ref 82–115)
HBA1C MFR BLD HPLC: 5.7 %
HCT VFR BLD AUTO: 43.3 % (ref 40–54)
HDLC SERPL-MCNC: 37 MG/DL (ref 35–60)
HGB BLD-MCNC: 14.5 G/DL (ref 13.5–18)
IMM GRANULOCYTES # BLD AUTO: 0.01 K/UL (ref 0–0.04)
IMM GRANULOCYTES NFR BLD: 0.2 % (ref 0–0.4)
LDLC SERPL CALC-MCNC: 151 MG/DL
LDLC/HDLC SERPL: 4.1 {RATIO}
LYMPHOCYTES # BLD AUTO: 1.46 K/UL (ref 1–4.8)
LYMPHOCYTES NFR BLD AUTO: 26.5 % (ref 27–41)
MCH RBC QN AUTO: 30.4 PG (ref 27–31)
MCHC RBC AUTO-ENTMCNC: 33.5 G/DL (ref 32–36)
MCV RBC AUTO: 90.8 FL (ref 80–96)
MONOCYTES # BLD AUTO: 0.65 K/UL (ref 0–0.8)
MONOCYTES NFR BLD AUTO: 11.8 % (ref 2–6)
MPC BLD CALC-MCNC: 10.6 FL (ref 9.4–12.4)
NEUTROPHILS # BLD AUTO: 3.15 K/UL (ref 1.8–7.7)
NEUTROPHILS NFR BLD AUTO: 57.2 % (ref 53–65)
NONHDLC SERPL-MCNC: 168 MG/DL
NRBC # BLD AUTO: 0 X10E3/UL
NRBC, AUTO (.00): 0 %
PLATELET # BLD AUTO: 208 K/UL (ref 150–400)
POTASSIUM SERPL-SCNC: 4.2 MMOL/L (ref 3.5–5.1)
PROT SERPL-MCNC: 7.3 G/DL (ref 5.8–7.6)
RBC # BLD AUTO: 4.77 M/UL (ref 4.6–6.2)
SODIUM SERPL-SCNC: 140 MMOL/L (ref 136–145)
TRIGL SERPL-MCNC: 85 MG/DL (ref 34–140)
VLDLC SERPL-MCNC: 17 MG/DL
WBC # BLD AUTO: 5.51 K/UL (ref 4.5–11)

## 2025-05-01 PROCEDURE — 80061 LIPID PANEL: CPT | Mod: ,,, | Performed by: CLINICAL MEDICAL LABORATORY

## 2025-05-01 PROCEDURE — 80053 COMPREHEN METABOLIC PANEL: CPT | Mod: ,,, | Performed by: CLINICAL MEDICAL LABORATORY

## 2025-05-01 PROCEDURE — 99214 OFFICE O/P EST MOD 30 MIN: CPT | Mod: ,,, | Performed by: NURSE PRACTITIONER

## 2025-05-01 PROCEDURE — 85025 COMPLETE CBC W/AUTO DIFF WBC: CPT | Mod: ,,, | Performed by: CLINICAL MEDICAL LABORATORY

## 2025-05-01 PROCEDURE — 83036 HEMOGLOBIN GLYCOSYLATED A1C: CPT | Mod: ,,, | Performed by: CLINICAL MEDICAL LABORATORY

## 2025-05-01 NOTE — PROGRESS NOTES
[facility]  [address] [place] [address]  Phone: [phone]  Fax: [phone]       PATIENT NAME: Ruben Alarcon   : 1945    AGE: 79 y.o. DATE OF ENCOUNTER: 25    MRN: [id number]      PCP: Juli Zapata FNP    Subjective:   CHANGE CHIEF COMPLAINT      :[id number]}274}  Chief Complaint   Patient presents with    Hypertension     Patient reports to the clinic today for yearly follow up and labs.    Hyperlipidemia    Health Maintenance     Care gaps addressed, patient declines all vaccines.    Minda Quick CMA     History of Present Illness    HPI:  Patient reports previously taking rosuvastatin but discontinued it due to GI discomfort. He had started taking it daily but stopped when his supply was exhausted. Patient recalls a past episode where he went to the ER and was diagnosed with A-Fib with a rapid rate and chest pain. The exact timing of this event is unclear. Patient has a history of cardiac procedures, including an ablation and a Watchman procedure performed in 2024. He is uncertain about what triggered the A-Fib episode, considering potential factors such as stress or anxiety about an upcoming social event. Patient also has a history of prostate and bladder cancer. He underwent radiation treatment for his prostate in 2023, with the prostate showing a lesion on the right side between the prostate and the outer sack and inner wall. The bladder also had one concerning spot. Patient continues to follow up with Dr. Garcia for his urology care and Dr. Chatman for radiation oncology care.      ROS:  Gastrointestinal: +indigestion         Allergies and Meds: 274}     Review of patient's allergies indicates:  No Known Allergies     Current Outpatient Medications   Medication Sig Dispense Refill    aspirin (ECOTRIN) 81 MG EC tablet Take 81 mg by mouth once daily.      carvediloL (COREG) 12.5 MG tablet Take 1 tablet (12.5 mg total) by mouth  2 (two) times daily. 180 tablet 1    cholecalciferol, vitamin D3, (VITAMIN D3) 50 mcg (2,000 unit) Cap capsule Take by mouth once daily.      losartan (COZAAR) 25 MG tablet Take 1 tablet (25 mg total) by mouth once daily. 90 tablet 3    omega-3 fatty acids/fish oil (FISH OIL-OMEGA-3 FATTY ACIDS) 300-1,000 mg capsule Take 1 capsule by mouth once daily.      pantoprazole (PROTONIX) 40 MG tablet TAKE 1 TABLET DAILY 90 tablet 3    spironolactone (ALDACTONE) 25 MG tablet Take 0.5 tablets (12.5 mg total) by mouth once daily. 45 tablet 3    rosuvastatin (CRESTOR) 10 MG tablet Take 1 tablet (10 mg total) by mouth once daily. (Patient not taking: Reported on 5/1/2025) 90 tablet 3     No current facility-administered medications for this visit.       Labs:274}   I have reviewed labs below:    Lab Results   Component Value Date    WBC 7.52 07/16/2024    RBC 3.80 (L) 07/16/2024    HGB 11.5 (L) 07/16/2024    HCT 35.2 (L) 07/16/2024     07/16/2024     07/16/2024    K 3.5 07/16/2024     (H) 07/16/2024    CALCIUM 8.7 07/16/2024     (H) 07/16/2024    BUN 22 (H) 07/16/2024    CREATININE 1.16 07/16/2024    ALT 26 07/16/2024    AST 18 07/16/2024    INR 0.99 07/16/2024    CHOL 114 07/16/2024    TRIG 145 07/16/2024    HDL 36 (L) 07/16/2024    LDLCALC 49 07/16/2024    TSH 3.590 07/16/2024    PSA 10.400 (H) 03/02/2023    HGBA1C 5.7 07/16/2024       Medical History: 274}     Past Medical History:   Diagnosis Date    Atrial fibrillation with slow ventricular response     COVID-19 11/01/2021    Hiatal hernia with gastroesophageal reflux disease and esophagitis     Hyperlipidemia     [place] grade C esophagitis 05/09/2023    Malignant neoplasm of overlapping sites of bladder 04/10/2024    Per records from [facility] received 04/10/2023:  Right posterior bladder mass on MRI prostate 06/14/2023.       CT urogram obtain 06/21/2023 with 1.6 cm polypoid intraluminal filling defect along the posterior lateral  "urinary bladder wall highly suspicious for transitional cell carcinoma.     Cystoscopy on 06/22/2023 showed a 3 cm papillary right bladder wall lesion.     TURBT > 5 cm perf    Malignant neoplasm of prostate 03/28/2024 July 2023 diagnosed per Dr. Filiberto Garcia.  Completed radiation 10/19/2023.      Primary hypertension     Tricuspid regurgitation     follows with Dr. Ma      Tobacco Use History[1]   Past Surgical History:   Procedure Laterality Date    CARDIAC ELECTROPHYSIOLOGY MAPPING AND ABLATION      Dr. Casimiro Avery at [facility]    CATARACT EXTRACTION, BILATERAL      CLOSURE OF LEFT ATRIAL APPENDAGE USING DEVICE      Watchman Procedure    SHOULDER SURGERY Left     TONSILLECTOMY          Health Maintenance:      Health Maintenance Topics with due status: Not Due       Topic Last Completion Date    Hemoglobin A1c (Prediabetes) 05/01/2025    Lipid Panel 05/01/2025       Objective:  274}   Vital Signs  Temp: 97.8 °F (36.6 °C)  Temp Source: Oral  Pulse: (!) 54  Resp: 18  SpO2: 98 %  BP: (!) 142/80  BP Location: Right arm  Patient Position: Sitting  Pain Score: 0-No pain  Height and Weight  Height: 5' 6" (167.6 cm)  Weight: 69 kg (152 lb 3.2 oz)  BSA (Calculated - sq m): 1.79 sq meters  BMI (Calculated): 24.6  Weight in (lb) to have BMI = 25: 154.6    Over the last two weeks how often have you been bothered by little interest or pleasure in doing things: 0  Over the last two weeks how often have you been bothered by feeling down, depressed or hopeless: 0  PHQ-2 Total Score: 0  PHQ-9 Score: 0  PHQ-9 Interpretation: Minimal or None    Wt Readings from Last 3 Encounters:   05/01/25 69 kg (152 lb 3.2 oz)   04/03/25 70.8 kg (156 lb)   11/05/24 69.9 kg (154 lb)     Physical Exam  Vitals and nursing note reviewed.   Constitutional:       General: He is not in acute distress.     Appearance: Normal appearance. He is not ill-appearing.   HENT:      Head: Normocephalic.   Eyes:      Conjunctiva/sclera: Conjunctivae " normal.   Neck:      Trachea: Trachea normal.   Cardiovascular:      Rate and Rhythm: Normal rate and regular rhythm.      Pulses: Normal pulses.      Heart sounds: Normal heart sounds.   Pulmonary:      Effort: Pulmonary effort is normal. No respiratory distress.      Breath sounds: Normal breath sounds. No wheezing, rhonchi or rales.   Musculoskeletal:      Cervical back: Neck supple.      Right lower leg: No edema.      Left lower leg: No edema.   Lymphadenopathy:      Cervical: No cervical adenopathy.   Skin:     General: Skin is warm and dry.      Coloration: Skin is not jaundiced or pale.   Neurological:      Mental Status: He is alert and oriented to person, place, and time.      Gait: Gait normal.   Psychiatric:         Mood and Affect: Mood normal.         Behavior: Behavior normal.          Assessment and Plan: 274}       1. Primary hypertension  Assessment & Plan:  BP fluctuates, sub-optimal control    BP Readings from Last 3 Encounters:   05/01/25 (!) 142/80   04/03/25 124/80   12/12/24 (!) 192/96     Continue spironolactone 12.5 mg for BP control and for CHF.   Continue carvedilol 12.5 mg 2 times per day and losartan 25 mg daily.  Follow-up with cardiology next week as scheduled.    Orders:  -     CBC Auto Differential; Future; Expected date: 05/01/2025    2. Other hyperlipidemia  Assessment & Plan:  Stopped taking rosuvastatin, due to GI discomfort and wants to see how his lipids look without it.    With his risk factors, and history of HLD, do not advise him staying off statin.  LDL previously 49 on rosuvastatin    Orders:  -     Comprehensive Metabolic Panel; Future; Expected date: 05/01/2025  -     Lipid Panel; Future; Expected date: 05/01/2025    3. Other long term (current) drug therapy  -     CBC Auto Differential; Future; Expected date: 05/01/2025  -     Comprehensive Metabolic Panel; Future; Expected date: 05/01/2025  -     Lipid Panel; Future; Expected date: 05/01/2025  -     Hemoglobin A1C;  Future; Expected date: 05/01/2025        Assessment & Plan    IMPRESSION:  - Reviewed cardiac history, including A-fib, ablation, and Watchman procedure.  - Considered prostate and bladder cancer history, noting successful treatment with radiation.  - Assessed need for cholesterol medication (rosuvastatin) based on cardiac risk factors.  - Patient discontinued rosuvastatin due to GI discomfort.    ATRIAL FIBRILLATION:  - Documented that the patient had atrial fibrillation with a rapid rate and chest pain in July 2024.  - Noted the patient's history of heart issues, including ablation.  - Instructed the patient to keep follow-up visits with cardiology for ongoing management and lipid review.  - Ordered lipid panel.    PROSTATE CANCER:  - Documented that the patient had prostate cancer with venous five picks on the right side.  - Discussed prostate cancer treatment and healing process with the patient.  - Administered radiation treatment for prostate cancer.    BLADDER CANCER:  - Documented that the patient had bladder cancer with one spot on the bladder line.  - Discussed bladder cancer treatment and healing process with the patient.  - Administered radiation treatment for bladder cancer.    CARDIAC IMPLANT:  - Noted that the patient has had a Watchman implant since January 2024.    FAMILY HISTORY:  - Noted that father lived to be nearly 100 years old.       Follow up in about 1 year (around 5/1/2026) for hypertension, and follow-up as needed.    Signature:  ULYSSES Gabriel-BC    Future Appointments   Date Time Provider Department Center   5/7/2025  9:00 AM Ruthy Davis ACNP [facility]   11/18/2025  8:30 AM Mecca Hylton MD [facility]   4/9/2026 10:00 AM AWV NURSE, [facility] [place]   5/5/2026  9:40 AM Juli Zapata FNP [facility] [place]     This note was generated with the assistance of ambient listening technology. Verbal  consent was obtained by the patient and accompanying visitor(s) for the recording of patient appointment to facilitate this note. I attest to having reviewed and edited the generated note for accuracy, though some syntax or spelling errors may persist. Please contact the author of this note for any clarification.           [1]   Social History  Tobacco Use   Smoking Status Never    Passive exposure: Never   Smokeless Tobacco Never

## 2025-05-04 ENCOUNTER — RESULTS FOLLOW-UP (OUTPATIENT)
Dept: FAMILY MEDICINE | Facility: CLINIC | Age: 80
End: 2025-05-04
Payer: MEDICARE

## 2025-05-04 NOTE — ASSESSMENT & PLAN NOTE
BP fluctuates, sub-optimal control    BP Readings from Last 3 Encounters:   05/01/25 (!) 142/80   04/03/25 124/80   12/12/24 (!) 192/96     Continue spironolactone 12.5 mg for BP control and for CHF.   Continue carvedilol 12.5 mg 2 times per day and losartan 25 mg daily.  Follow-up with cardiology next week as scheduled.

## 2025-05-04 NOTE — PROGRESS NOTES
Call patient and review results. Slight decline in GFR and increase in creatinine, but with his medical history and age, is not of great concern.  He still f/u with Dr. Chatman and Dr. Garcia for history of bladder and prostate cancer.  His cholesterol is terrible and he really needs to resume rosuvastatin.  Has he tried taking it at night to see if that helps with the GI side effects.  I feel certain cardiology would also recommend he stay on a statin.

## 2025-05-04 NOTE — ASSESSMENT & PLAN NOTE
Stopped taking rosuvastatin, due to GI discomfort and wants to see how his lipids look without it.    With his risk factors, and history of HLD, do not advise him staying off statin.  LDL previously 49 on rosuvastatin

## 2025-05-05 ENCOUNTER — TELEPHONE (OUTPATIENT)
Dept: CARDIOLOGY | Facility: CLINIC | Age: 80
End: 2025-05-05
Payer: MEDICARE

## 2025-05-07 ENCOUNTER — OFFICE VISIT (OUTPATIENT)
Dept: CARDIOLOGY | Facility: CLINIC | Age: 80
End: 2025-05-07
Payer: MEDICARE

## 2025-05-07 VITALS
WEIGHT: 154.19 LBS | DIASTOLIC BLOOD PRESSURE: 80 MMHG | SYSTOLIC BLOOD PRESSURE: 132 MMHG | HEIGHT: 66 IN | BODY MASS INDEX: 24.78 KG/M2 | OXYGEN SATURATION: 98 %

## 2025-05-07 DIAGNOSIS — I10 PRIMARY HYPERTENSION: Chronic | ICD-10-CM

## 2025-05-07 DIAGNOSIS — I50.42 CHRONIC COMBINED SYSTOLIC AND DIASTOLIC HEART FAILURE: Chronic | ICD-10-CM

## 2025-05-07 DIAGNOSIS — E78.5 HYPERLIPIDEMIA, UNSPECIFIED HYPERLIPIDEMIA TYPE: ICD-10-CM

## 2025-05-07 DIAGNOSIS — I34.0 MITRAL VALVE INSUFFICIENCY, UNSPECIFIED ETIOLOGY: ICD-10-CM

## 2025-05-07 DIAGNOSIS — I48.91 ATRIAL FIBRILLATION, UNSPECIFIED TYPE: Primary | ICD-10-CM

## 2025-05-07 DIAGNOSIS — Z79.899 HIGH RISK MEDICATION USE: ICD-10-CM

## 2025-05-07 DIAGNOSIS — I48.0 PAROXYSMAL ATRIAL FIBRILLATION: Chronic | ICD-10-CM

## 2025-05-07 DIAGNOSIS — I07.1 TRICUSPID VALVE INSUFFICIENCY, UNSPECIFIED ETIOLOGY: ICD-10-CM

## 2025-05-07 DIAGNOSIS — E78.49 OTHER HYPERLIPIDEMIA: Chronic | ICD-10-CM

## 2025-05-07 PROCEDURE — 93010 ELECTROCARDIOGRAM REPORT: CPT | Mod: S$PBB,,, | Performed by: INTERNAL MEDICINE

## 2025-05-07 PROCEDURE — 99999 PR PBB SHADOW E&M-EST. PATIENT-LVL IV: CPT | Mod: PBBFAC,,, | Performed by: NURSE PRACTITIONER

## 2025-05-07 PROCEDURE — 99214 OFFICE O/P EST MOD 30 MIN: CPT | Mod: PBBFAC | Performed by: NURSE PRACTITIONER

## 2025-05-07 PROCEDURE — 99214 OFFICE O/P EST MOD 30 MIN: CPT | Mod: S$PBB,,, | Performed by: NURSE PRACTITIONER

## 2025-05-07 PROCEDURE — 93005 ELECTROCARDIOGRAM TRACING: CPT | Mod: PBBFAC | Performed by: INTERNAL MEDICINE

## 2025-05-07 NOTE — ASSESSMENT & PLAN NOTE
Patient stopped taking the Crestor due to GI issues. He has now restarted the med approx one week ago. Repeat lipids/alt after 2 months back on his Crestor.  Lipids followed by PCP

## 2025-05-07 NOTE — ASSESSMENT & PLAN NOTE
s/p a fib ablation by Dr. Avery and a Watchman Implant as well.   7/16/2024  echo demonstrated an LVEF of 35% with severe left atrial enlargement compared to recent echo at [facility] in 3/2024 which demonstrated LVEF of 61% and normal LA size.   The decline in LVEF is most likely r/t a fib with RVR.     Sinus bradycardia on EKG today

## 2025-05-07 NOTE — ASSESSMENT & PLAN NOTE
LVEF declined after prolonged a fib with RVR and normalized after conversion to RSR and a fib ablation.   Spironolactone stopped due to hyperkalemia  Continue carvedilol and losartan

## 2025-05-07 NOTE — PROGRESS NOTES
PCP: Juli Zapata FNP    Referring Provider:   Chief Complaint   Patient presents with    Palpitations     At times       Subjective:   Ruben Alarcon is a 79 y.o. male with hx of atrial fibrillation s/p ablation by Dr. Avery, HfimpEF, HTN,  and upper GI bleed,  who presents for routine follow up. He states that he is doing well and denies complaints.      7/31/2024 presents for HD follow up. Patient had issues with a fib with RVR and ventricular rate in the 170s. He was admitted to [facility] from 7/16/2024-7/17/2024. He is s/p a fib ablation by Dr. Avery and a Watchman Implant as well.   During the admission, an echo demonstrated an LVEF of 35% with severe left atrial enlargement compared to recent echo at [facility] in 3/2024 which demonstrated LVEF of 61% and normal LA size.   The decline in LVEF is most likely r/t a fib with RVR. He was started on losartan and his coreg was continued. He was rx aldactone but there is a problem at the pharmacy and he has not received it yet.     3/27/2024 presents for routine follow up. He states that he is doing well and denies complaints.     9/27/2023 presents for routine follow up. He denies cardiac complaints.   The patient has recently been diagnosed with cancer and is having radiation therapy. Dr. Avery has recommended postponing the Watchman implant until after his radiation therapy has been completed.   3/27/2023 presents for follow up to discuss options for therapy after recent upper GI bleeding. He is now back on his Eliquis for CVA prophylaxis but would like to be referred to Dr. Avery again to be considered for the Watchman device.         Fhx:  Family History   Problem Relation Name Age of Onset    Hypertension Mother      Hypertension Father      Cancer Sister      Leukemia Sister      Cancer Brother      Prostate cancer Brother      Cancer Brother      Bladder Cancer Brother       Shx:   Social History     Socioeconomic History    Marital status:     Tobacco Use    Smoking status: Never     Passive exposure: Never    Smokeless tobacco: Never   Substance and Sexual Activity    Alcohol use: Never    Drug use: Never    Sexual activity: Yes     Partners: Female     Social Drivers of Health     Financial Resource Strain: Low Risk  (4/24/2025)    Overall Financial Resource Strain (CARDIA)     Difficulty of Paying Living Expenses: Not hard at all   Food Insecurity: No Food Insecurity (4/24/2025)    Hunger Vital Sign     Worried About Running Out of Food in the Last Year: Never true     Ran Out of Food in the Last Year: Never true   Transportation Needs: No Transportation Needs (4/24/2025)    PRAPARE - Transportation     Lack of Transportation (Medical): No     Lack of Transportation (Non-Medical): No   Physical Activity: Insufficiently Active (4/24/2025)    Exercise Vital Sign     Days of Exercise per Week: 3 days     Minutes of Exercise per Session: 30 min   Stress: No Stress Concern Present (4/24/2025)    [employer] - Occupational Stress Questionnaire     Feeling of Stress : Not at all   Housing Stability: Low Risk  (4/24/2025)    Housing Stability Vital Sign     Unable to Pay for Housing in the Last Year: No     Number of Times Moved in the Last Year: 0     Homeless in the Last Year: No       EKG   5/7/2025 sinus bradycardia; HR 50 bpm; incomplete RBBB; moderate voltage criteria fo rLVH; NS-ST-TWA  7/31/2024 RSR with sinus arrhythmia; HR 68 bpm; minimal voltage criteria for LVH; NSTWA; when compared with EKG 7/16/2024 no significant change was found.  7/16/2024 RSR with HR 63 bpm; normal EKG  7/16/2024 a fib with HR 90 bpm;   7/16/2024 a fib with RVR  bpm; incomplete RBBB  3/27/2024 RSR with marked sinus arrhythmia; HR 60 bpm; minimal voltage criteria for LVH; NSTWA  Results for orders placed or performed in visit on 07/31/24   EKG 12-lead    Collection Time: 07/31/24 10:44 AM   Result Value Ref Range    QRS Duration 102 ms     [facility] QTC Calculation 444 ms    Narrative    Test Reason : I48.91,    Vent. Rate : 068 BPM     Atrial Rate : 068 BPM     P-R Int : 164 ms          QRS Dur : 102 ms      QT Int : 418 ms       P-R-T Axes : 048 -26 -03 degrees     QTc Int : 444 ms    Normal sinus rhythm with sinus arrhythmia  Minimal voltage criteria for LVH, may be normal variant ( R in aVL )  Nonspecific T wave abnormality  Abnormal ECG  When compared with ECG of 16-JUL-2024 22:18,  No significant change was found  Confirmed by Ruben Ma MD ([id number]) on 8/1/2024 8:43:01 AM    Referred By:             Confirmed By:Ruben Ma MD      Results for orders placed during the hospital encounter of 10/17/24    Echo    Interpretation Summary    Left Ventricle: The left ventricle is normal in size. Normal wall thickness. There is eccentric hypertrophy. There is normal systolic function with a visually estimated ejection fraction of 55 - 60%. Biplane (2D) method of discs ejection fraction is 56%.    Right Ventricle: Normal right ventricular cavity size. Systolic function is normal.    Left Atrium: Left atrium is moderately dilated.    Mitral Valve: There is moderate regurgitation.    Tricuspid Valve: There is moderate regurgitation.    Pulmonary Artery: The estimated pulmonary artery systolic pressure is 25 mmHg.    IVC/SVC: Normal venous pressure at 3 mmHg.    Echo 7/16/2024  Summary  Show Result Comparison       Left Ventricle: Left ventricle was not well visualized due to poor sonic window. The left ventricle is moderately dilated. Moderately increased ventricular mass. Normal wall thickness. There is concentric hypertrophy. Regional wall motion abnormalities present. Mid posterior and mid inferior wall segments most notably. There is severely reduced systolic function. Ejection fraction by visual approximation is 35%. There is diastolic dysfunction but grade cannot be determined. LV apical cavity obstruction at rest. Complete obliteration of the  cavity.    Right Ventricle: Moderate right ventricular enlargement. Systolic function is severely reduced.    Left Atrium: Left atrium is severely dilated.    Right Atrium: Right atrium is mildly dilated.    Mitral Valve: There is mild regurgitation.    Tricuspid Valve: There is mild to moderate regurgitation.    IVC/SVC: Normal venous pressure at 3 mmHg.    Pericardium: There is a moderate effusion. No indication of cardiac tamponade. Evidence includes no IVC dilation.      Lab Results   Component Value Date     05/01/2025    K 4.2 05/01/2025     (H) 05/01/2025    CO2 23 05/01/2025    BUN 20 05/01/2025    CREATININE 1.32 (H) 05/01/2025    CALCIUM 8.4 (L) 05/01/2025    ANIONGAP 13 05/01/2025       Lab Results   Component Value Date    CHOL 205 (H) 05/01/2025    CHOL 114 07/16/2024    CHOL 200 05/01/2024     Lab Results   Component Value Date    HDL 37 05/01/2025    HDL 36 (L) 07/16/2024    HDL 50 05/01/2024     Lab Results   Component Value Date    LDLCALC 151 05/01/2025    LDLCALC 49 07/16/2024    LDLCALC 130 05/01/2024     Lab Results   Component Value Date    TRIG 85 05/01/2025    TRIG 145 07/16/2024    TRIG 101 05/01/2024     Lab Results   Component Value Date    CHOLHDL 5.5 05/01/2025    CHOLHDL 3.2 07/16/2024    CHOLHDL 4.0 05/01/2024       Lab Results   Component Value Date    WBC 5.51 05/01/2025    HGB 14.5 05/01/2025    HCT 43.3 05/01/2025    MCV 90.8 05/01/2025     05/01/2025           Current Outpatient Medications:     aspirin (ECOTRIN) 81 MG EC tablet, Take 81 mg by mouth once daily., Disp: , Rfl:     carvediloL (COREG) 12.5 MG tablet, Take 1 tablet (12.5 mg total) by mouth 2 (two) times daily., Disp: 180 tablet, Rfl: 1    cholecalciferol, vitamin D3, (VITAMIN D3) 50 mcg (2,000 unit) Cap capsule, Take by mouth once daily., Disp: , Rfl:     losartan (COZAAR) 25 MG tablet, Take 1 tablet (25 mg total) by mouth once daily., Disp: 90 tablet, Rfl: 3    omega-3 fatty acids/fish oil (FISH  "OIL-OMEGA-3 FATTY ACIDS) 300-1,000 mg capsule, Take 1 capsule by mouth once daily., Disp: , Rfl:     pantoprazole (PROTONIX) 40 MG tablet, TAKE 1 TABLET DAILY, Disp: 90 tablet, Rfl: 3    rosuvastatin (CRESTOR) 10 MG tablet, Take 1 tablet (10 mg total) by mouth once daily., Disp: 90 tablet, Rfl: 3    spironolactone (ALDACTONE) 25 MG tablet, Take 0.5 tablets (12.5 mg total) by mouth once daily. (Patient not taking: Reported on 5/7/2025), Disp: 45 tablet, Rfl: 3  Meds reviewed but not reconciled.  He did not bring his meds today.    Review of Systems   Respiratory:  Negative for shortness of breath.    Cardiovascular:  Positive for palpitations. Negative for chest pain, orthopnea, claudication, leg swelling and PND.        Occasional palpitations   Neurological:  Negative for dizziness, loss of consciousness and weakness.           Objective:   /80 (BP Location: Left arm, Patient Position: Sitting)   Ht 5' 6" (1.676 m)   Wt 69.9 kg (154 lb 3.2 oz)   SpO2 98%   BMI 24.89 kg/m²     Physical Exam  Vitals and nursing note reviewed.   Constitutional:       Appearance: Normal appearance. He is normal weight.   HENT:      Head: Normocephalic and atraumatic.   Neck:      Vascular: No carotid bruit.   Cardiovascular:      Rate and Rhythm: Normal rate and regular rhythm.      Pulses: Normal pulses.      Heart sounds: Normal heart sounds.   Pulmonary:      Effort: Pulmonary effort is normal.      Breath sounds: Normal breath sounds.   Abdominal:      Palpations: Abdomen is soft.   Musculoskeletal:      Cervical back: Neck supple.      Right lower leg: No edema.      Left lower leg: No edema.   Skin:     General: Skin is warm and dry.      Capillary Refill: Capillary refill takes less than 2 seconds.   Neurological:      Mental Status: He is alert.           Assessment:     1. Atrial fibrillation, unspecified type  EKG 12-lead    EKG 12-lead      2. Hyperlipidemia, unspecified hyperlipidemia type  Lipid Panel      3. " High risk medication use  ALT (SGPT)      4. Tricuspid valve insufficiency, unspecified etiology        5. Mitral valve insufficiency, unspecified etiology        6. Primary hypertension        7. Paroxysmal atrial fibrillation        8. Other hyperlipidemia        9. Chronic combined systolic and diastolic heart failure              Plan:   Tricuspid regurgitation  Moderate TR by echo 10/17/2024    Mitral regurgitation  Moderate MR by echo 10/17/2024    Primary hypertension  132/80 mmHg    Paroxysmal atrial fibrillation   s/p a fib ablation by Dr. Avery and a Watchman Implant as well.   7/16/2024  echo demonstrated an LVEF of 35% with severe left atrial enlargement compared to recent echo at [facility] in 3/2024 which demonstrated LVEF of 61% and normal LA size.   The decline in LVEF is most likely r/t a fib with RVR.     Sinus bradycardia on EKG today    Other hyperlipidemia  Patient stopped taking the Crestor due to GI issues. He has now restarted the med approx one week ago. Repeat lipids/alt after 2 months back on his Crestor.  Lipids followed by PCP    Chronic combined systolic and diastolic heart failure  LVEF declined after prolonged a fib with RVR and normalized after conversion to RSR and a fib ablation.   Spironolactone stopped due to hyperkalemia  Continue carvedilol and losartan        RTC: 6 months

## 2025-05-08 LAB
OHS QRS DURATION: 102 MS
OHS QTC CALCULATION: 404 MS

## 2025-07-07 ENCOUNTER — RESULTS FOLLOW-UP (OUTPATIENT)
Dept: CARDIOLOGY | Facility: CLINIC | Age: 80
End: 2025-07-07